# Patient Record
Sex: MALE | Race: WHITE | NOT HISPANIC OR LATINO | Employment: FULL TIME | URBAN - METROPOLITAN AREA
[De-identification: names, ages, dates, MRNs, and addresses within clinical notes are randomized per-mention and may not be internally consistent; named-entity substitution may affect disease eponyms.]

---

## 2017-02-22 ENCOUNTER — OFFICE VISIT (OUTPATIENT)
Dept: URGENT CARE | Age: 22
End: 2017-02-22

## 2017-02-22 ENCOUNTER — TRANSCRIBE ORDERS (OUTPATIENT)
Dept: URGENT CARE | Age: 22
End: 2017-02-22

## 2017-02-22 ENCOUNTER — LAB REQUISITION (OUTPATIENT)
Dept: LAB | Facility: HOSPITAL | Age: 22
End: 2017-02-22

## 2017-02-22 ENCOUNTER — APPOINTMENT (OUTPATIENT)
Dept: LAB | Age: 22
End: 2017-02-22

## 2017-02-22 DIAGNOSIS — J02.9 ACUTE PHARYNGITIS: ICD-10-CM

## 2017-02-22 PROCEDURE — 87070 CULTURE OTHR SPECIMN AEROBIC: CPT | Performed by: NURSE PRACTITIONER

## 2017-02-22 PROCEDURE — G0382 LEV 3 HOSP TYPE B ED VISIT: HCPCS

## 2017-02-24 LAB — BACTERIA THROAT CULT: NORMAL

## 2017-05-07 ENCOUNTER — OFFICE VISIT (OUTPATIENT)
Dept: URGENT CARE | Age: 22
End: 2017-05-07

## 2017-05-07 PROCEDURE — G0382 LEV 3 HOSP TYPE B ED VISIT: HCPCS | Performed by: FAMILY MEDICINE

## 2017-10-24 ENCOUNTER — OFFICE VISIT (OUTPATIENT)
Dept: URGENT CARE | Age: 22
End: 2017-10-24

## 2017-10-24 PROCEDURE — G0382 LEV 3 HOSP TYPE B ED VISIT: HCPCS | Performed by: FAMILY MEDICINE

## 2017-10-25 NOTE — PROGRESS NOTES
Assessment  1  Acute URI (465 9) (J06 9)    Plan  Acute URI    · Amoxicillin 500 MG Oral Capsule; TAKE 1 CAPSULE 3 TIMES DAILY UNTIL GONE    Discussion/Summary  Discussion Summary:   Amoxicillin 3 times a day until finished (please take probiotics)  /asthma medication is needed  follow-up with family physician as needed  go to the hospital emergency department if worse  Medication Side Effects Reviewed: Possible side effects of new medications were reviewed with the patient/guardian today  Understands and agrees with treatment plan: The treatment plan was reviewed with the patient/guardian  The patient/guardian understands and agrees with the treatment plan   Counseling Documentation With Imm: The patient was counseled regarding  Follow Up Instructions: Follow Up with your Primary Care Provider in 7-10 days  If your symptoms worsen, go to the nearest [place] [address] Emergency Department  Chief Complaint  1  Cold Symptoms  Chief Complaint Free Text Note Form: c/o cold x 2-3 days      History of Present Illness  HPI: Patient states he has a cold; patient states his asthma has been a little worse   Hospital Based Practices Required Assessment:   Pain Assessment   the patient states they do not have pain  Abuse And Domestic Violence Screen    Yes, the patient is safe at home  -- The patient states no one is hurting them  Depression And Suicide Screen  No, the patient has not had thoughts of hurting themself  No, the patient has not felt depressed in the past 7 days  Prefered Language is  english  Review of Systems  Focused-Male:   Constitutional: as noted in HPI    ENT: nasal discharge, but-- as noted in HPI  Cardiovascular: no complaints of slow or fast heart rate, no chest pain, no palpitations, no leg claudication or lower extremity edema  Respiratory: cough-- and-- wheezing, but-- as noted in HPI     Gastrointestinal: no complaints of abdominal pain, no constipation, no nausea or vomiting, no diarrhea or bloody stools  Genitourinary: no complaints of dysuria or incontinence, no hesitancy, no nocturia, no genital lesion, no inadequacy of penile erection  Musculoskeletal: no complaints of arthralgia, no myalgia, no joint swelling or stiffness, no limb pain or swelling  Integumentary: no complaints of skin rash or lesion, no itching or dry skin, no skin wounds  Neurological: no complaints of headache, no confusion, no numbness or tingling, no dizziness or fainting  ROS Reviewed:   ROS reviewed  Active Problems  1  Acute gastroenteritis (558 9) (K52 9)   2  Acute URI (465 9) (J06 9)   3  Asthma (493 90) (J45 909)   4  Candidiasis, cutaneous (112 3) (B37 2)   5  Epistaxis (784 7) (R04 0)   6  Gastroenteritis (558 9) (K52 9)   7  Shortness of breath (786 05) (R06 02)   8  Sinusitis, acute (461 9) (J01 90)   9  Skin rash (782 1) (R21)   10  Sore throat (462) (J02 9)    Past Medical History  1  Acute pharyngitis (462) (J02 9)   2  History of Fracture Of The Ankle (824 8)   3  History of Mild Intermittent Asthma (493 90)   4  History of Sore throat (462) (J02 9)  Active Problems And Past Medical History Reviewed: The active problems and past medical history were reviewed and updated today  Family History  Mother    1  No pertinent family history  Family History    2  Family history of No Significant Family History  Family History Reviewed: The family history was reviewed and updated today  Social History   · Denied: History of Alcohol Use (History)   · Daily Coffee Consumption (1  Cups/Day)   · Denied: History of Drug Use   · Never a smoker   · Smoker, current status unknown (305 1) (F17 200)  Social History Reviewed: The social history was reviewed and updated today  The social history was reviewed and is unchanged  Surgical History  1  History of Oral Surgery Tooth Extraction  Surgical History Reviewed: The surgical history was reviewed and updated today  Current Meds   1  Albuterol Sulfate  MCG/ACT AERS; Therapy: (Recorded:52Ipt6430) to Recorded   2  Flovent HFA 44 MCG/ACT Inhalation Aerosol; INHALE 2 PUFFS EVERY 12 HOURS; Therapy: 34IOA6614 to (Evaluate:39Ref4619)  Requested for: 72TQV2859; Last   Rx:70Sow7849 Ordered  Medication List Reviewed: The medication list was reviewed and updated today  Allergies  1  No Known Drug Allergies  2  No Known Environmental Allergies   3  No Known Food Allergies    Vitals  Signs   Recorded: 74QQJ1533 02:42PM   Temperature: 97 8 F, Temporal  Heart Rate: 89  Respiration: 18  Systolic: 957  Diastolic: 78  Height: 6 ft   Weight: 320 lb   BMI Calculated: 43 4  BSA Calculated: 2 6  O2 Saturation: 96  Pain Scale: 4    Physical Exam    Constitutional   General appearance: No acute distress, well appearing and well nourished  Ears, Nose, Mouth, and Throat   External inspection of ears and nose: Normal     Otoscopic examination: Tympanic membrance translucent with normal light reflex  Canals patent without erythema  -- nasal congestion  -- injection the oropharynx  Pulmonary   Respiratory effort: No increased work of breathing or signs of respiratory distress  Auscultation of lungs: Clear to auscultation  -- no wheezing  Cardiovascular   Auscultation of heart: Normal rate and rhythm, normal S1 and S2, without murmurs  Lymphatic   Palpation of lymph nodes in neck: No lymphadenopathy  Skin good color and turgor  Neurologic grossly intact, no nuchal rigidity     Psychiatric   Orientation to person, place and time: Normal     Mood and affect: Normal        Signatures   Electronically signed by : Gladys Marino DO; Oct 24 2017  2:52PM EST                       (Author)

## 2017-12-29 ENCOUNTER — APPOINTMENT (EMERGENCY)
Dept: CT IMAGING | Facility: HOSPITAL | Age: 22
End: 2017-12-29
Payer: COMMERCIAL

## 2017-12-29 ENCOUNTER — HOSPITAL ENCOUNTER (EMERGENCY)
Facility: HOSPITAL | Age: 22
Discharge: HOME | End: 2017-12-29
Attending: EMERGENCY MEDICINE | Admitting: EMERGENCY MEDICINE
Payer: COMMERCIAL

## 2017-12-29 VITALS
HEART RATE: 91 BPM | BODY MASS INDEX: 45.43 KG/M2 | OXYGEN SATURATION: 99 % | DIASTOLIC BLOOD PRESSURE: 80 MMHG | RESPIRATION RATE: 16 BRPM | WEIGHT: 315 LBS | SYSTOLIC BLOOD PRESSURE: 130 MMHG | TEMPERATURE: 99 F

## 2017-12-29 DIAGNOSIS — R42 DIZZINESS: Primary | ICD-10-CM

## 2017-12-29 LAB
ALBUMIN SERPL BCP-MCNC: 3.5 G/DL (ref 3.5–5)
ALP SERPL-CCNC: 78 U/L (ref 46–116)
ALT SERPL W P-5'-P-CCNC: 54 U/L (ref 12–78)
ANION GAP SERPL CALCULATED.3IONS-SCNC: 7 MMOL/L (ref 4–13)
AST SERPL W P-5'-P-CCNC: 22 U/L (ref 5–45)
BASOPHILS # BLD AUTO: 0.01 THOUSANDS/ΜL (ref 0–0.1)
BASOPHILS NFR BLD AUTO: 0 % (ref 0–1)
BILIRUB SERPL-MCNC: 0.2 MG/DL (ref 0.2–1)
BUN SERPL-MCNC: 10 MG/DL (ref 5–25)
CALCIUM SERPL-MCNC: 8.7 MG/DL (ref 8.3–10.1)
CHLORIDE SERPL-SCNC: 102 MMOL/L (ref 100–108)
CLARITY, POC: CLEAR
CO2 SERPL-SCNC: 31 MMOL/L (ref 21–32)
COLOR, POC: YELLOW
CREAT SERPL-MCNC: 0.63 MG/DL (ref 0.6–1.3)
EOSINOPHIL # BLD AUTO: 0.23 THOUSAND/ΜL (ref 0–0.61)
EOSINOPHIL NFR BLD AUTO: 3 % (ref 0–6)
ERYTHROCYTE [DISTWIDTH] IN BLOOD BY AUTOMATED COUNT: 12.8 % (ref 11.6–15.1)
EST. AVERAGE GLUCOSE BLD GHB EST-MCNC: 108 MG/DL
EXT BILIRUBIN, UA: NEGATIVE
EXT BLOOD URINE: NEGATIVE
EXT GLUCOSE, UA: NEGATIVE
EXT KETONES: NEGATIVE
EXT NITRITE, UA: NEGATIVE
EXT PH, UA: 5
EXT PROTEIN, UA: NEGATIVE
EXT SPECIFIC GRAVITY, UA: 1.02
EXT UROBILINOGEN: NEGATIVE
GFR SERPL CREATININE-BSD FRML MDRD: 140 ML/MIN/1.73SQ M
GLUCOSE SERPL-MCNC: 99 MG/DL (ref 65–140)
HBA1C MFR BLD: 5.4 % (ref 4.2–6.3)
HCT VFR BLD AUTO: 46.3 % (ref 36.5–49.3)
HGB BLD-MCNC: 15.3 G/DL (ref 12–17)
LYMPHOCYTES # BLD AUTO: 2.53 THOUSANDS/ΜL (ref 0.6–4.47)
LYMPHOCYTES NFR BLD AUTO: 33 % (ref 14–44)
MCH RBC QN AUTO: 29.6 PG (ref 26.8–34.3)
MCHC RBC AUTO-ENTMCNC: 33 G/DL (ref 31.4–37.4)
MCV RBC AUTO: 90 FL (ref 82–98)
MONOCYTES # BLD AUTO: 0.66 THOUSAND/ΜL (ref 0.17–1.22)
MONOCYTES NFR BLD AUTO: 9 % (ref 4–12)
NEUTROPHILS # BLD AUTO: 4.31 THOUSANDS/ΜL (ref 1.85–7.62)
NEUTS SEG NFR BLD AUTO: 55 % (ref 43–75)
PLATELET # BLD AUTO: 226 THOUSANDS/UL (ref 149–390)
PMV BLD AUTO: 10 FL (ref 8.9–12.7)
POTASSIUM SERPL-SCNC: 3.8 MMOL/L (ref 3.5–5.3)
PROT SERPL-MCNC: 6.9 G/DL (ref 6.4–8.2)
RBC # BLD AUTO: 5.17 MILLION/UL (ref 3.88–5.62)
SODIUM SERPL-SCNC: 140 MMOL/L (ref 136–145)
WBC # BLD AUTO: 7.74 THOUSAND/UL (ref 4.31–10.16)
WBC # BLD EST: NEGATIVE 10*3/UL

## 2017-12-29 PROCEDURE — 96360 HYDRATION IV INFUSION INIT: CPT

## 2017-12-29 PROCEDURE — 85025 COMPLETE CBC W/AUTO DIFF WBC: CPT | Performed by: PHYSICIAN ASSISTANT

## 2017-12-29 PROCEDURE — 81002 URINALYSIS NONAUTO W/O SCOPE: CPT | Performed by: PHYSICIAN ASSISTANT

## 2017-12-29 PROCEDURE — 96361 HYDRATE IV INFUSION ADD-ON: CPT

## 2017-12-29 PROCEDURE — 99284 EMERGENCY DEPT VISIT MOD MDM: CPT

## 2017-12-29 PROCEDURE — 83036 HEMOGLOBIN GLYCOSYLATED A1C: CPT | Performed by: PHYSICIAN ASSISTANT

## 2017-12-29 PROCEDURE — 70450 CT HEAD/BRAIN W/O DYE: CPT

## 2017-12-29 PROCEDURE — 80053 COMPREHEN METABOLIC PANEL: CPT | Performed by: PHYSICIAN ASSISTANT

## 2017-12-29 PROCEDURE — 36415 COLL VENOUS BLD VENIPUNCTURE: CPT | Performed by: PHYSICIAN ASSISTANT

## 2017-12-29 RX ADMIN — SODIUM CHLORIDE 1000 ML: 0.9 INJECTION, SOLUTION INTRAVENOUS at 18:49

## 2017-12-30 NOTE — DISCHARGE INSTRUCTIONS
Dizziness   WHAT YOU NEED TO KNOW:   Dizziness is a feeling of being off balance or unsteady  Common causes of dizziness are an inner ear fluid imbalance or a lack of oxygen in your blood  Dizziness may be acute (lasts 3 days or less) or chronic (lasts longer than 3 days)  You may have dizzy spells that last from seconds to a few hours  DISCHARGE INSTRUCTIONS:   Return to the emergency department if:   · You have a headache and a stiff neck  · You have shaking chills and a fever  · You vomit over and over with no relief  · Your vomit or bowel movements are red or black  · You have pain in your chest, back, or abdomen  · You have numbness, especially in your face, arms, or legs  · You have trouble moving your arms or legs  · You are confused  Contact your healthcare provider if:   · You have a fever  · Your symptoms do not get better with treatment  · You have questions or concerns about your condition or care  Manage your symptoms:   · Do not drive  or operate heavy machinery when you are dizzy  · Get up slowly  from sitting or lying down  · Drink plenty of liquids  Liquids help prevent dehydration  Ask how much liquid to drink each day and which liquids are best for you  Follow up with your healthcare provider as directed:  Write down your questions so you remember to ask them during your visits  © 2017 [address] Information is for End User's use only and may not be sold, redistributed or otherwise used for commercial purposes  All illustrations and images included in CareNotes® are the copyrighted property of [employer]  or [address]  The above information is an  only  It is not intended as medical advice for individual conditions or treatments  Talk to your doctor, nurse or pharmacist before following any medical regimen to see if it is safe and effective for you

## 2018-01-01 NOTE — ED PROVIDER NOTES
History  Chief Complaint   Patient presents with    Dizziness     Pt reports intermittant dizziness x 2 weeks  FSBS = 188 at approx 1500  Reports high blood pressure of 145/101       51-year-old male presents to the emergency department with complaints of intermittent dizziness  States that he has been getting dizzy over the past 2 weeks  Describes the dizziness as double vision  States that this goes away after a short time when lying down or taking a nap  Sometimes has associated headaches  States that his family has a history of diabetes and high blood pressure  Has been taking his blood pressure at home and notes that has been running high  Took his blood sugar at home today and it was 188  This was not fasting reading  He does not follow with a family doctor  States that he recently had an eye exam and got new glasses,   But that double vision started prior to this  History provided by:  Patient   used: No    Dizziness   Description: double vision  Severity:  Mild  Onset quality:  Gradual  Duration:  2 weeks  Timing:  Intermittent  Progression:  Waxing and waning  Chronicity:  New  Context: not when bending over, not with bowel movement, not with ear pain, not with eye movement, not with head movement, not with inactivity, not with loss of consciousness, not with medication, not with physical activity, not when standing up and not when urinating    Relieved by:  Lying down  Associated symptoms: vision changes    Associated symptoms: no blood in stool, no chest pain, no diarrhea, no headaches, no hearing loss, no nausea, no palpitations, no shortness of breath, no syncope, no tinnitus, no vomiting and no weakness        Prior to Admission Medications   Prescriptions Last Dose Informant Patient Reported? Taking?   ibuprofen (MOTRIN) 800 mg tablet   No No   Sig: Take 1 tablet (800 mg total) by mouth 3 (three) times a day        Facility-Administered Medications: None History reviewed  No pertinent past medical history  History reviewed  No pertinent surgical history  History reviewed  No pertinent family history  I have reviewed and agree with the history as documented  Social History   Substance Use Topics    Smoking status: Never Smoker    Smokeless tobacco: Never Used    Alcohol use Yes      Comment: socially        Review of Systems   Constitutional: Negative for activity change, appetite change, chills and fever  HENT: Negative for congestion, dental problem, drooling, ear discharge, ear pain, hearing loss, mouth sores, nosebleeds, rhinorrhea, sore throat, tinnitus and trouble swallowing  Eyes: Negative for pain, discharge and itching  Respiratory: Negative for cough, chest tightness, shortness of breath and wheezing  Cardiovascular: Negative for chest pain, palpitations and syncope  Gastrointestinal: Negative for abdominal pain, blood in stool, constipation, diarrhea, nausea and vomiting  Endocrine: Negative for cold intolerance and heat intolerance  Genitourinary: Negative for difficulty urinating, dysuria, flank pain, frequency and urgency  Skin: Negative for rash and wound  Allergic/Immunologic: Negative for food allergies and immunocompromised state  Neurological: Positive for dizziness  Negative for seizures, syncope, weakness, numbness and headaches  Psychiatric/Behavioral: Negative for agitation, behavioral problems and confusion         Physical Exam  ED Triage Vitals   Temperature Pulse Respirations Blood Pressure SpO2   12/29/17 1606 12/29/17 1606 12/29/17 1606 12/29/17 1606 12/29/17 1606   99 °F (37 2 °C) 84 16 152/86 99 %      Temp Source Heart Rate Source Patient Position - Orthostatic VS BP Location FiO2 (%)   12/29/17 1606 12/29/17 2000 12/29/17 2000 12/29/17 2000 --   Oral Monitor Sitting Left arm       Pain Score       12/29/17 1606       5           Orthostatic Vital Signs  Vitals:    12/29/17 1606 12/29/17 2000 12/29/17 2008   BP: 152/86 136/85 130/80   Pulse: 84 90 91   Patient Position - Orthostatic VS:  Sitting Sitting       Physical Exam   Constitutional: He is oriented to person, place, and time  He appears well-developed and well-nourished  No distress  HENT:   Head: Normocephalic and atraumatic  Right Ear: External ear normal    Left Ear: External ear normal    Mouth/Throat: Oropharynx is clear and moist  No oropharyngeal exudate  Eyes: Conjunctivae and EOM are normal  Pupils are equal, round, and reactive to light  Neck: No JVD present  No tracheal deviation present  Cardiovascular: Normal rate, regular rhythm and normal heart sounds  Exam reveals no gallop and no friction rub  No murmur heard  Pulmonary/Chest: Effort normal and breath sounds normal  No respiratory distress  He has no wheezes  He has no rales  He exhibits no tenderness  Abdominal: Soft  Bowel sounds are normal  He exhibits no distension  There is no tenderness  There is no guarding  Musculoskeletal: Normal range of motion  He exhibits no edema, tenderness or deformity  Lymphadenopathy:     He has no cervical adenopathy  Neurological: He is alert and oriented to person, place, and time  Skin: Skin is warm and dry  No rash noted  He is not diaphoretic  No erythema  Psychiatric: He has a normal mood and affect  His behavior is normal    Nursing note and vitals reviewed        ED Medications  Medications   sodium chloride 0 9 % bolus 1,000 mL (0 mL Intravenous Stopped 12/29/17 2044)       Diagnostic Studies  Results Reviewed     Procedure Component Value Units Date/Time    Hemoglobin A1c [[id number]]  (Normal) Collected:  12/29/17 1848    Lab Status:  Final result Specimen:  Blood from Arm, Right Updated:  12/29/17 2323     Hemoglobin A1C 5 4 %       mg/dl     POCT urinalysis dipstick [[id number]]  (Normal) Resulted:  12/29/17 2008    Lab Status:  Final result Specimen:  Urine Updated:  12/29/17 2008     Color, UA yellow Clarity, UA clear     EXT Glucose, UA negative     EXT Bilirubin, UA (Ref: Negative) negative     EXT Ketones, UA (Ref: Negative) negative     EXT Spec Grav, UA 1 020     EXT Blood, UA (Ref: Negative) negative     EXT pH, UA 5 0     EXT Protein, UA (Ref: Negative) negative     EXT Urobilinogen, UA (Ref: 0 2- 1 0) negative     EXT Leukocytes, UA (Ref: Negative) negative     EXT Nitrite, UA (Ref: Negative) negative    Comprehensive metabolic panel [[id number]] Collected:  12/29/17 1848    Lab Status:  Final result Specimen:  Blood from Arm, Right Updated:  12/29/17 1919     Sodium 140 mmol/L      Potassium 3 8 mmol/L      Chloride 102 mmol/L      CO2 31 mmol/L      Anion Gap 7 mmol/L      BUN 10 mg/dL      Creatinine 0 63 mg/dL      Glucose 99 mg/dL      Calcium 8 7 mg/dL      AST 22 U/L      ALT 54 U/L      Alkaline Phosphatase 78 U/L      Total Protein 6 9 g/dL      Albumin 3 5 g/dL      Total Bilirubin 0 20 mg/dL      eGFR 140 ml/min/1 73sq m     Narrative:         National Kidney Disease Education Program recommendations are as follows:  GFR calculation is accurate only with a steady state creatinine  Chronic Kidney disease less than 60 ml/min/1 73 sq  meters  Kidney failure less than 15 ml/min/1 73 sq  meters      CBC and differential [[id number]]  (Normal) Collected:  12/29/17 1848    Lab Status:  Final result Specimen:  Blood from Arm, Right Updated:  12/29/17 1855     WBC 7 74 Thousand/uL      RBC 5 17 Million/uL      Hemoglobin 15 3 g/dL      Hematocrit 46 3 %      MCV 90 fL      MCH 29 6 pg      MCHC 33 0 g/dL      RDW 12 8 %      MPV 10 0 fL      Platelets 936 Thousands/uL      Neutrophils Relative 55 %      Lymphocytes Relative 33 %      Monocytes Relative 9 %      Eosinophils Relative 3 %      Basophils Relative 0 %      Neutrophils Absolute 4 31 Thousands/µL      Lymphocytes Absolute 2 53 Thousands/µL      Monocytes Absolute 0 66 Thousand/µL      Eosinophils Absolute 0 23 Thousand/µL      Basophils Absolute 0 01 Thousands/µL                  CT head without contrast   Final Result by Kwan Bae MD (12/29 1838)      Normal unenhanced CT of the head  Workstation performed: [id number]                    Procedures  Procedures       Phone Contacts  ED Phone Contact    ED Course  ED Course                                MDM  Number of Diagnoses or Management Options  Dizziness:   Diagnosis management comments:   Differential diagnosis includes but not limited to:    New onset diabetes, hypertension, cranial nerve palsy,   Ocular migraine       Amount and/or Complexity of Data Reviewed  Clinical lab tests: ordered and reviewed  Tests in the radiology section of CPT®: reviewed and ordered  Independent visualization of images, tracings, or specimens: yes      CritCare Time    Disposition  Final diagnoses:   Dizziness     Time reflects when diagnosis was documented in both MDM as applicable and the Disposition within this note     Time User Action Codes Description Comment    12/29/2017  8:29 PM Jamee Meter Add [R42] Dizziness       ED Disposition     ED Disposition Condition Comment    Discharge  Cheryl Croft discharge to home/self care  Condition at discharge: Stable        Follow-up Information     Follow up With Specialties Details Why Contact Info    Tiffanie Cary DO Family Medicine Schedule an appointment as soon as possible for a visit  [address] [place] [address]   [phone]          Discharge Medication List as of 12/29/2017  8:29 PM      STOP taking these medications       ibuprofen (MOTRIN) 800 mg tablet Comments:   Reason for Stopping:             No discharge procedures on file      ED Provider  Electronically Signed by           Herberth Taveras PA-C  12/31/17 6145

## 2018-01-03 ENCOUNTER — ALLSCRIPTS OFFICE VISIT (OUTPATIENT)
Dept: OTHER | Facility: OTHER | Age: 23
End: 2018-01-03

## 2018-01-04 NOTE — PROGRESS NOTES
Assessment   1  IFG (impaired fasting glucose) (790 21) (R73 01)   2  Family history of malignant neoplasm of breast (V16 3) (Z80 3) : Mother   3  Family history of diabetes mellitus (V18 0) (Z83 3) : Mother, Father, Sister, Brother   4  Need for prophylactic vaccination and inoculation against influenza (V04 81) (Z23)   5  Need for Tdap vaccination (V06 1) (Z23)   6  BMI 40 0-44 9, adult (V85 41) (Z68 41)    Plan   Asthma    · Ventolin  (90 Base) MCG/ACT Inhalation Aerosol Solution; INHALE 2    PUFFS EVERY 4-6 HOURS AS NEEDED  Asthma, PMH: History of shortness of breath    · Flovent HFA 44 MCG/ACT Inhalation Aerosol; INHALE 2 PUFFS EVERY 12    HOURS  BMI 40 0-44 9, adult    · Diets that are low in carbohydrates and high in protein are very popular for weight loss ;    Status:Complete;   Done: 27KDG9483   · Eat a low fat and low cholesterol diet ; Status:Complete;   Done: 32OIR1346   · There are many exercise options for seniors ; Status:Complete;   Done: 83URZ5333   · We encourage all of our patients to exercise regularly  30 minutes of exercise or physical    activity five or more days a week is recommended for children and adults ;    Status:Complete;   Done: 43SOZ0441   · Call [phone] if: You have pain in your abdomen ; Status:Complete;   Done:    44INO1674   · Call 911 if: You have sudden or severe chest pain with shortness of breath, rapid    breathing, or cough ; Status:Complete;   Done: 89YOO7077   · Seek Immediate Medical Attention if: You experience a new kind of chest pain (angina) or    pressure ; Status:Complete;   Done: 88YBP1247  BMI 40 0-44 9, adult, IFG (impaired fasting glucose)    · (1) LIPID PANEL FASTING W DIRECT LDL REFLEX; Status:Active; Requested    for:02Apr2018;   IFG (impaired fasting glucose)    · (1) COMPREHENSIVE METABOLIC PANEL; Status:Active; Requested for:02Apr2018;    · (1) HEMOGLOBIN A1C; Status:Active;  Requested for:02Apr2018;    · (1) TSH WITH FT4 REFLEX; Status:Active; Requested for:02Apr2018;   Need for prophylactic vaccination and inoculation against influenza    · Stop: Fluzone Quadrivalent 0 5 ML Intramuscular Suspension Prefilled    Syringe   · Fluzone Quadrivalent 0 5 ML Intramuscular Suspension Prefilled Syringe  Need for Tdap vaccination    · Adacel 5-2-15 5 LF-MCG/0 5 Intramuscular Suspension    Discussion/Summary      Lanre Chapman has started an intense diet plan, he is starting to exercise  will watch sugars carefully and check labs in 4 months  Chief Complaint   Patient here with elevated blood sugar and elevated blood pressure      History of Present Illness   went to ER, sugar and bp was elevated 135-156 early, no snacking after that up to 35 pounds thirsty but urinating a lot new baby on the way    The patient is being seen for a routine clinic follow-up of pre-diabetes  Recent measurements: fasting glucose 135 mg/dL  Symptoms:  weight gain  Review of Systems        Constitutional: No fever or chills, feels well, no tiredness, no recent weight gain or weight loss  Eyes: No complaints of eye pain, no red eyes, no discharge from eyes, no itchy eyes  ENT: no complaints of earache, no hearing loss, no nosebleeds, no nasal discharge, no sore throat, no hoarseness  Cardiovascular: No complaints of slow heart rate, no fast heart rate, no chest pain, no palpitations, no leg claudication, no lower extremity  Respiratory: No complaints of shortness of breath, no wheezing, no cough, no SOB on exertion, no orthopnea or PND  Gastrointestinal: No complaints of abdominal pain, no constipation, no nausea or vomiting, no diarrhea or bloody stools  Genitourinary: No complaints of dysuria, no incontinence, no hesitancy, no nocturia, no genital lesion, no testicular pain  Musculoskeletal: No complaints of arthralgia, no myalgias, no joint swelling or stiffness, no limb pain or swelling        Integumentary: No complaints of skin rash or skin lesions, no itching, no skin wound, no dry skin  Neurological: No compliants of headache, no confusion, no convulsions, no numbness or tingling, no dizziness or fainting, no limb weakness, no difficulty walking  Psychiatric: Is not suicidal, no sleep disturbances, no anxiety or depression, no change in personality, no emotional problems  Endocrine: No complaints of proptosis, no hot flashes, no muscle weakness, no erectile dysfunction, no deepening of the voice, no feelings of weakness  Hematologic/Lymphatic: No complaints of swollen glands, no swollen glands in the neck, does not bleed easily, no easy bruising  Active Problems   1  Asthma (493 90) (J45 909)    Past Medical History   1  Acute pharyngitis (462) (J02 9)   2  History of Fracture Of The Ankle (824 8)   3  History of Mild Intermittent Asthma (493 90)   4  History of Sore throat (462) (J02 9)     The active problems and past medical history were reviewed and updated today  Surgical History   1  History of Oral Surgery Tooth Extraction     The surgical history was reviewed and updated today  Family History   Family History    1  Family history of No Significant Family History     The family history was reviewed and updated today  Social History    · Denied: History of Alcohol Use (History)   · Daily Coffee Consumption (1  Cups/Day)   · Denied: History of Drug Use   · Never a smoker   · Smoker, current status unknown (305 1) (F17 200)  The social history was reviewed and updated today  Current Meds    1  Albuterol Sulfate  MCG/ACT AERS; Therapy: (Recorded:41Btc4769) to Recorded   2  Flovent HFA 44 MCG/ACT Inhalation Aerosol; INHALE 2 PUFFS EVERY 12 HOURS; Therapy: 81ZHU7501 to (Evaluate:47Cqk7489)  Requested for: 59GWR2207; Last     Rx:35Uja2014 Ordered     The medication list was reviewed and updated today  Allergies   1  No Known Drug Allergies  2   No Known Environmental Allergies   3  No Known Food Allergies    Vitals   Vital Signs    Recorded: 67NPG1101 10:06AM   Heart Rate 88   Respiration 18   Systolic 143   Diastolic 74   Height 6 ft    Weight 335 lb 8 oz   BMI Calculated 45 5   BSA Calculated 2 65     Physical Exam        Constitutional      General appearance: No acute distress, well appearing and well nourished  Pulmonary      Respiratory effort: No increased work of breathing or signs of respiratory distress  Auscultation of lungs: Clear to auscultation, equal breath sounds bilaterally, no wheezes, no rales, no rhonci  Cardiovascular      Auscultation of heart: Normal rate and rhythm, normal S1 and S2, without murmurs  Examination of extremities for edema and/or varicosities: Normal        Abdomen      Abdomen: Non-tender, no masses  Liver and spleen: No hepatomegaly or splenomegaly  Lymphatic      Palpation of lymph nodes in neck: No lymphadenopathy  Results/Data   (1) HEMOGLOBIN A1C [id number] 06:48PM [facility], Provider   Test ordered by: Michael Aguilar      Test Name Result Flag Reference   HEMOGLOBIN A1C 5 4 %  4 2-6 3   EST  AVG   GLUCOSE 108 mg/dl        Future Appointments      Date/Time Provider Specialty Site   05/03/2018 02:15 PM Sujit Perrin DO Family Medicine [address] [facility]     Signatures    Electronically signed by : Jenny Encinas DO; Matt  3 2018 11:14AM EST                       (Author)

## 2018-01-07 ENCOUNTER — OFFICE VISIT (OUTPATIENT)
Dept: URGENT CARE | Age: 23
End: 2018-01-07
Payer: COMMERCIAL

## 2018-01-07 PROCEDURE — 99212 OFFICE O/P EST SF 10 MIN: CPT | Performed by: FAMILY MEDICINE

## 2018-01-08 NOTE — PROGRESS NOTES
Assessment   1  Pain, dental (525 9) (K08 89)    Plan   Pain, dental    · Start: Amoxicillin 500 MG Oral Capsule; TAKE 1 CAPSULE 3 TIMES DAILY UNTIL GONE    Discussion/Summary   Discussion Summary:    Amoxicillin 3 times a day until finished ( please take probiotics)  Aleve every 12 hours for pain and inflammation ( please take with food )   and swish mouth with warm salt water or mouthwash  foods  follow-up with dentist tomorrow as scheduled  go to the hospital emergency department if worse  Medication Side Effects Reviewed: Possible side effects of new medications were reviewed with the patient/guardian today  Understands and agrees with treatment plan: The treatment plan was reviewed with the patient/guardian  The patient/guardian understands and agrees with the treatment plan    Counseling Documentation With Imm: The patient was counseled regarding  Chief Complaint   1  Pain  Chief Complaint Free Text Note Form: C/O tooth pain upper right side for 8 months, but it got worse last night  Cheek is swollen and tooth is throbbing  has a dental appt  tomorrow, but has to work tonight and can't handle the pain  History of Present Illness   HPI: Right upper dental pain and swelling - patient states he has a dental appointment tomorrow morning (01/08/2018)    Hospital Based Practices Required Assessment:      Pain Assessment      the patient states they have pain  The pain is located in the upper right tooth  The patient describes the pain as throbbing  (on a scale of 0 to 10, the patient rates the pain at 8 )      Abuse And Domestic Violence Screen       Yes, the patient is safe at home  -- The patient states no one is hurting them  Depression And Suicide Screen  No, the patient has not had thoughts of hurting themself  No, the patient has not felt depressed in the past 7 days        Review of Systems   Focused-Male:      Constitutional: as noted in HPI       ENT: no complaints of earache, no loss of hearing, no nosebleeds or nasal discharge, no sore throat or hoarseness  Cardiovascular: no complaints of slow or fast heart rate, no chest pain, no palpitations, no leg claudication or lower extremity edema  Respiratory: no complaints of shortness of breath, no wheezing or cough, no dyspnea on exertion, no orthopnea or PND  Gastrointestinal: no complaints of abdominal pain, no constipation, no nausea or vomiting, no diarrhea or bloody stools  Genitourinary: no complaints of dysuria or incontinence, no hesitancy, no nocturia, no genital lesion, no inadequacy of penile erection  Musculoskeletal: no complaints of arthralgia, no myalgia, no joint swelling or stiffness, no limb pain or swelling  Integumentary: no complaints of skin rash or lesion, no itching or dry skin, no skin wounds  Neurological: no complaints of headache, no confusion, no numbness or tingling, no dizziness or fainting  ROS Reviewed:    ROS reviewed  Active Problems   1  Asthma (493 90) (J45 909)  2  BMI 40 0-44 9, adult (V85 41) (Z68 41)  3  IFG (impaired fasting glucose) (790 21) (R73 01)  4  Need for prophylactic vaccination and inoculation against influenza (V04 81) (Z23)  5  Need for Tdap vaccination (V06 1) (Z23)    Past Medical History   1  Acute pharyngitis (462) (J02 9)  2  History of Fracture Of The Ankle (824 8)  3  History of Mild Intermittent Asthma (493 90)  4  History of Sore throat (462) (J02 9)  Active Problems And Past Medical History Reviewed: The active problems and past medical history were reviewed and updated today  Family History   Mother   1  Family history of diabetes mellitus (V18 0) (Z83 3)  2  Family history of malignant neoplasm of breast (V16 3) (Z80 3)  Father   3  Family history of diabetes mellitus (V18 0) (Z83 3)  Sister   4  Family history of diabetes mellitus (V18 0) (Z83 3)  Brother   5   Family history of diabetes mellitus (V18 0) (Z83 3)  Family History   6  Family history of No Significant Family History  Family History Reviewed: The family history was reviewed and updated today  Social History    · Denied: History of Alcohol Use (History)   · Daily Coffee Consumption (1  Cups/Day)   · Denied: History of Drug Use   · Never a smoker   · Smoker, current status unknown (305 1) (F17 200)  Social History Reviewed: The social history was reviewed and updated today  The social history was reviewed and is unchanged  Surgical History   1  History of Oral Surgery Tooth Extraction  Surgical History Reviewed: The surgical history was reviewed and updated today  Current Meds   1  Flovent HFA 44 MCG/ACT Inhalation Aerosol; INHALE 2 PUFFS EVERY 12 HOURS; Therapy: 66VHL3509 to (Yong María Elena)  Requested for: 11XWT5414; Last     Rx:03Jan2018 Ordered  2  Ventolin  (90 Base) MCG/ACT Inhalation Aerosol Solution; INHALE 2 PUFFS     EVERY 4-6 HOURS AS NEEDED  Requested for: 84JZX0705; Last CS:55CRE1345     Ordered  Medication List Reviewed: The medication list was reviewed and updated today  Allergies   1  No Known Drug Allergies  2  No Known Environmental Allergies  3  No Known Food Allergies    Vitals   Signs   Recorded: 52NLY1299 03:13PM   Temperature: 97 1 F, Temporal  Heart Rate: 96  Respiration: 18  Systolic: 304, RUE, Sitting  Diastolic: 80, RUE, Sitting  Height: 6 ft   Weight: 335 lb 8 oz  BMI Calculated: 45 5  BSA Calculated: 2 65  O2 Saturation: 97    Physical Exam        Ears, Nose, Mouth, and Throat tenderness of right upper dentition with swelling of gum  Message   Return to work or school:         No work 01/07/2018  1           1 Amended By: Ashley Jones; Jan 07 2018 3:38 PM EST      Future Appointments      Date/Time Provider Specialty Site   05/03/2018 02:15 PM Elaine Salazar DO Family Medicine [facility]     Signatures    Electronically signed by : Dionne Perez DO; Jan 7 2018  3:34PM EST                       (Author)     Electronically signed by : Zulma Toney DO; Jan 7 2018  3:38PM EST                       (Author)

## 2018-01-18 NOTE — MISCELLANEOUS
Message  Return to work or school:   Nova Guaman is under my professional care   He was seen in my office on 12/30/2016   He is able to return to work on  01/02/2016            Signatures   Electronically signed by : Mirela German; Dec 30 2016 11:13AM EST                       (Author)    Electronically signed by : Mirela German; Matt  3 2017  8:04AM EST                       (Author)

## 2018-01-20 ENCOUNTER — OFFICE VISIT (OUTPATIENT)
Dept: URGENT CARE | Age: 23
End: 2018-01-20
Payer: COMMERCIAL

## 2018-01-20 PROCEDURE — 99213 OFFICE O/P EST LOW 20 MIN: CPT | Performed by: FAMILY MEDICINE

## 2018-01-23 VITALS
HEART RATE: 96 BPM | WEIGHT: 315 LBS | SYSTOLIC BLOOD PRESSURE: 124 MMHG | TEMPERATURE: 97.1 F | HEIGHT: 72 IN | DIASTOLIC BLOOD PRESSURE: 80 MMHG | OXYGEN SATURATION: 97 % | BODY MASS INDEX: 42.66 KG/M2 | RESPIRATION RATE: 18 BRPM

## 2018-01-23 VITALS
DIASTOLIC BLOOD PRESSURE: 74 MMHG | BODY MASS INDEX: 42.66 KG/M2 | HEIGHT: 72 IN | WEIGHT: 315 LBS | HEART RATE: 88 BPM | SYSTOLIC BLOOD PRESSURE: 130 MMHG | RESPIRATION RATE: 18 BRPM

## 2018-01-23 NOTE — PROGRESS NOTES
Assessment   1  Viral gastroenteritis (008 8) (A08 4)    Plan   Nausea    · Administered: Ondansetron 4 MG Oral Tablet Disintegrating (Zofran ODT)  Viral gastroenteritis    · Start: Ondansetron 4 MG Oral Tablet Disintegrating (Zofran ODT); TAKE 1 TABLET Every 8    hours PRN   · Drink plenty of fluids ; Status:Complete;   Done: 28QMZ7599   · Resume activity to your tolerance ; Status:Complete;   Done: 84NHZ4381   · We suggest that you try a probiotic supplement ; Status:Complete;   Done: 77OPG1689    Discussion/Summary   Discussion Summary:    Liquids only for the 1st 4-6 hours  Recommend Gatorade or another electrolyte drink  After that he can start following the BRAT diet  Medication Side Effects Reviewed: Possible side effects of new medications were reviewed with the patient/guardian today  Understands and agrees with treatment plan: The treatment plan was reviewed with the patient/guardian  The patient/guardian understands and agrees with the treatment plan    Counseling Documentation With Imm: The patient was counseled regarding instructions for management,-- prognosis,-- patient and family education,-- impressions,-- risks and benefits of treatment options,-- importance of compliance with treatment  Follow Up Instructions: Follow Up with your Primary Care Provider in 3-4 days  If your symptoms worsen, go to the nearest [place] [address] Emergency Department  Chief Complaint   1  Fever  2  Vomiting  Chief Complaint Free Text Note Form: Pt c/o fever, vomiting that started last evening while at work  Pt temp was 99 8 with tylenol  History of Present Illness   HPI: Patient started last night around 12 30 with nausea vomiting and fever  Patient last vomited 3 o'clock    Hospital Based Practices Required Assessment:      Pain Assessment      the patient states they do not have pain   (on a scale of 0 to 10, the patient rates the pain at 0 )      Abuse And Domestic Violence Screen       Yes, the patient is safe at home  -- The patient states no one is hurting them  Depression And Suicide Screen  No, the patient has not had thoughts of hurting themself  No, the patient has not felt depressed in the past 7 days  Vomiting: ASAEL LOPEZ presents with complaints of vomiting  Associated symptoms include nausea,-- abdominal pain,-- fever-- and-- diarrhea, but-- no abdominal bloating,-- no difficulty swallowing,-- no painful swallowing,-- no hematemesis,-- no myalgias,-- no chest pain,-- no pelvic pain,-- no back pain,-- no chills,-- no headache,-- no weight loss-- and-- no constipation  Review of Systems   Focused-Male:      Constitutional: as noted in HPI       ENT: as noted in HPI  Gastrointestinal: as noted in HPI  Active Problems   1  Asthma (493 90) (J45 909)  2  BMI 40 0-44 9, adult (V85 41) (Z68 41)  3  IFG (impaired fasting glucose) (790 21) (R73 01)  4  Need for prophylactic vaccination and inoculation against influenza (V04 81) (Z23)  5  Need for Tdap vaccination (V06 1) (Z23)  6  Pain, dental (525 9) (K08 89)    Past Medical History   1  Acute pharyngitis (462) (J02 9)  2  History of Fracture Of The Ankle (824 8)  3  History of Mild Intermittent Asthma (493 90)  4  History of Sore throat (462) (J02 9)  Active Problems And Past Medical History Reviewed: The active problems and past medical history were reviewed and updated today  Family History   Mother   1  Family history of diabetes mellitus (V18 0) (Z83 3)  2  Family history of malignant neoplasm of breast (V16 3) (Z80 3)  Father   3  Family history of diabetes mellitus (V18 0) (Z83 3)  Sister   4  Family history of diabetes mellitus (V18 0) (Z83 3)  Brother   5  Family history of diabetes mellitus (V18 0) (Z83 3)  Family History   6  Family history of No Significant Family History  Family History Reviewed: The family history was reviewed and updated today         Social History    · Denied: History of Alcohol Use (History)   · Daily Coffee Consumption (1  Cups/Day)   · Denied: History of Drug Use   · Never a smoker   · Smoker, current status unknown (305 1) (F17 200)  Social History Reviewed: The social history was reviewed and updated today  Surgical History   1  History of Oral Surgery Tooth Extraction  Surgical History Reviewed: The surgical history was reviewed and updated today  Current Meds   1  Asmanex  MCG/ACT Inhalation Aerosol; INHALE 2 PUFFS Twice daily Rinse     mouth after every use; Therapy: 47XKD0336 to (Last Rx:11Jan2018)  Requested for: 72FDW6583 Ordered  2  Ventolin  (90 Base) MCG/ACT Inhalation Aerosol Solution; INHALE 2 PUFFS     EVERY 4-6 HOURS AS NEEDED  Requested for: 13EBR3064; Last MU:47CRS0506     Ordered  Medication List Reviewed: The medication list was reviewed and updated today  Allergies   1  No Known Drug Allergies  2  No Known Environmental Allergies  3  No Known Food Allergies    Vitals   Signs   Recorded: 64HQL2114 73:28YT   Systolic: 493  Diastolic: 78  Recorded: 12VFV9440 07:45PM   Temperature: 98 9 F  Height: 6 ft 7 in  Weight: 335 lb 9 oz  BMI Calculated: 37 8  BSA Calculated: 2 84  O2 Saturation: 98  Pain Scale: 0    Physical Exam        Constitutional      General appearance: No acute distress, well appearing and well nourished  Eyes      Conjunctiva and lids: No swelling, erythema, or discharge  Pupils and irises: Equal, round and reactive to light  Abdomen Abdomen is soft and nondistended with hyperactive bowel sounds  No rebound no guarding no rigidity  Liver and spleen: No hepatomegaly or splenomegaly  Message   Return to work or school:    Jesus Arredondo is under my professional care   He was seen in my office on 01/20/2018   Please excuse work 01/19/2018 and 01/21/2018 due to illness            Future Appointments      Date/Time Provider Specialty Site   05/03/2018 02:15 PM Jermain Duvall DO Family Medicine [facility]     Signatures    Electronically signed by : Shaylee Webb, [facility]; Jan 20 2018  8:17PM EST                       (Author)     Electronically signed by : Hugh Miranda DO; Jan 22 2018  8:30AM EST                       (Co-author)

## 2018-01-24 NOTE — MISCELLANEOUS
Message  Return to work or school:        No work 01/07/2018          Future Appointments    Signatures   Electronically signed by : Gabby Mesa DO; Jan 7 2018  3:38PM EST                       (Author)

## 2018-04-02 DIAGNOSIS — R73.01 IMPAIRED FASTING GLUCOSE: ICD-10-CM

## 2018-04-04 ENCOUNTER — HOSPITAL ENCOUNTER (EMERGENCY)
Facility: HOSPITAL | Age: 23
Discharge: HOME | End: 2018-04-04
Attending: EMERGENCY MEDICINE | Admitting: EMERGENCY MEDICINE

## 2018-04-04 ENCOUNTER — APPOINTMENT (EMERGENCY)
Dept: CT IMAGING | Facility: HOSPITAL | Age: 23
End: 2018-04-04

## 2018-04-04 VITALS
HEIGHT: 71 IN | WEIGHT: 315 LBS | OXYGEN SATURATION: 96 % | DIASTOLIC BLOOD PRESSURE: 83 MMHG | BODY MASS INDEX: 44.1 KG/M2 | TEMPERATURE: 98.3 F | RESPIRATION RATE: 18 BRPM | HEART RATE: 90 BPM | SYSTOLIC BLOOD PRESSURE: 162 MMHG

## 2018-04-04 DIAGNOSIS — R10.33 PERIUMBILICAL PAIN: Primary | ICD-10-CM

## 2018-04-04 PROCEDURE — 99284 EMERGENCY DEPT VISIT MOD MDM: CPT

## 2018-04-04 PROCEDURE — 74176 CT ABD & PELVIS W/O CONTRAST: CPT

## 2018-04-04 NOTE — ED PROVIDER NOTES
History  Chief Complaint   Patient presents with    Abdominal Pain     Pt presetns w/ c/o abd pain for 2 months  Pt believes he has a hernia but wants to get it evaluatated  History provided by:  Patient  Abdominal Pain   Pain location:  Periumbilical  Pain quality: aching    Pain radiates to:  Does not radiate  Pain severity:  Mild  Onset quality:  Gradual  Timing:  Constant  Progression:  Worsening  Chronicity:  New  Relieved by:  Nothing  Worsened by:  Nothing  Ineffective treatments:  None tried  Associated symptoms: no chest pain, no chills, no cough, no diarrhea, no dysuria, no fever, no nausea, no shortness of breath and no sore throat        None       History reviewed  No pertinent past medical history  History reviewed  No pertinent surgical history  History reviewed  No pertinent family history  I have reviewed and agree with the history as documented  Social History   Substance Use Topics    Smoking status: Former Smoker    Smokeless tobacco: Former User    Alcohol use Yes      Comment: socially        Review of Systems   Constitutional: Negative for chills and fever  HENT: Negative for rhinorrhea, sore throat and trouble swallowing  Eyes: Negative for pain  Respiratory: Negative for cough, shortness of breath, wheezing and stridor  Cardiovascular: Negative for chest pain and leg swelling  Gastrointestinal: Positive for abdominal pain  Negative for diarrhea and nausea  Endocrine: Negative for polyuria  Genitourinary: Negative for dysuria, flank pain and urgency  Musculoskeletal: Negative for joint swelling, myalgias and neck stiffness  Skin: Negative for rash  Allergic/Immunologic: Negative for immunocompromised state  Neurological: Negative for dizziness, syncope, weakness, numbness and headaches  Psychiatric/Behavioral: Negative for confusion and suicidal ideas  All other systems reviewed and are negative        Physical Exam  ED Triage Vitals Temperature Pulse Respirations Blood Pressure SpO2   04/04/18 1239 04/04/18 1237 04/04/18 1237 04/04/18 1237 04/04/18 1237   98 3 °F (36 8 °C) 90 18 162/83 96 %      Temp Source Heart Rate Source Patient Position - Orthostatic VS BP Location FiO2 (%)   04/04/18 1239 04/04/18 1237 04/04/18 1237 04/04/18 1237 --   Oral Monitor Sitting Right arm       Pain Score       04/04/18 1251       6           Orthostatic Vital Signs  Vitals:    04/04/18 1237 04/04/18 1245   BP: 162/83 162/83   Pulse: 90    Patient Position - Orthostatic VS: Sitting        Physical Exam   Constitutional: He is oriented to person, place, and time  He appears well-developed and well-nourished  HENT:   Head: Normocephalic and atraumatic  Eyes: EOM are normal  Pupils are equal, round, and reactive to light  Neck: Normal range of motion  Neck supple  Cardiovascular: Normal rate and regular rhythm  Exam reveals no friction rub  No murmur heard  Pulmonary/Chest: Breath sounds normal  No respiratory distress  He has no wheezes  He has no rales  Abdominal: Soft  Bowel sounds are normal  He exhibits no distension  There is tenderness in the periumbilical area  Musculoskeletal: Normal range of motion  He exhibits no edema or tenderness  Neurological: He is alert and oriented to person, place, and time  Skin: Skin is warm  No rash noted  Psychiatric: He has a normal mood and affect  Nursing note and vitals reviewed  ED Medications  Medications - No data to display    Diagnostic Studies  Results Reviewed     None                 CT abdomen pelvis wo contrast   Final Result by Precious Botello MD (04/04 1411)      Normal unenhanced CT of the abdomen and pelvis                 Workstation performed: [id number]                    Procedures  Procedures       Phone Contacts  ED Phone Contact    ED Course  ED Course                                MDM  Number of Diagnoses or Management Options  Periumbilical pain: new and requires workup  Diagnosis management comments: 19-year-old male presents to the emergency department with symptoms of abdominal pain and periumbilical discomfort for the past several weeks duration  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  No chest pain  Mild tenderness to palpation overlying the periumbilical area  A CT scan done in the emergency department is unremarkable  Plan outpatient management followup  Given strict instructions when to return back to the emergency department  Pt re-examined and evaluated after testing and treatment  Spoke with the patient and feeling improved and sxs have resolved  Will discharge home with close f/u with pcp and instructed to return to the ED if sxs worsen or continue  Pt agrees with the plan for discharge and feels comfortable to go home with proper f/u  Advised to return for worsening or additional problems  Diagnostic tests were reviewed and questions answered  Diagnosis, care plan and treatment options were discussed  The patient understand instructions and will follow up as directed  Amount and/or Complexity of Data Reviewed  Tests in the radiology section of CPT®: ordered and reviewed  Review and summarize past medical records: yes      CritCare Time    Disposition  Final diagnoses:   Periumbilical pain     Time reflects when diagnosis was documented in both MDM as applicable and the Disposition within this note     Time User Action Codes Description Comment    4/4/2018  2:24 PM Marci Rendon Add [H91 18] Periumbilical pain       ED Disposition     ED Disposition Condition Comment    Discharge  Henrry Sender Lang discharge to home/self care  Condition at discharge: Stable        Follow-up Information    None       There are no discharge medications for this patient  No discharge procedures on file      ED Provider  Electronically Signed by           Rossy Khan DO  04/04/18 8683

## 2018-04-04 NOTE — ED NOTES
Patient transported to [facility] Missy Ruvalcaba RN  04/04/18 0802

## 2018-04-15 ENCOUNTER — OFFICE VISIT (OUTPATIENT)
Dept: URGENT CARE | Age: 23
End: 2018-04-15

## 2018-04-15 VITALS
HEART RATE: 88 BPM | OXYGEN SATURATION: 98 % | SYSTOLIC BLOOD PRESSURE: 140 MMHG | DIASTOLIC BLOOD PRESSURE: 80 MMHG | HEIGHT: 71 IN | TEMPERATURE: 97.8 F | BODY MASS INDEX: 44.1 KG/M2 | RESPIRATION RATE: 20 BRPM | WEIGHT: 315 LBS

## 2018-04-15 DIAGNOSIS — R10.33 ABDOMINAL PAIN, PERIUMBILICAL: Primary | ICD-10-CM

## 2018-04-15 PROCEDURE — G0382 LEV 3 HOSP TYPE B ED VISIT: HCPCS | Performed by: FAMILY MEDICINE

## 2018-04-15 RX ORDER — ALBUTEROL SULFATE 90 UG/1
2 AEROSOL, METERED RESPIRATORY (INHALATION)
COMMUNITY
End: 2022-03-29 | Stop reason: SDUPTHER

## 2018-04-15 RX ORDER — ONDANSETRON 4 MG/1
1 TABLET, ORALLY DISINTEGRATING ORAL EVERY 8 HOURS PRN
COMMUNITY
Start: 2018-01-20 | End: 2018-07-17

## 2018-04-15 RX ORDER — FLUTICASONE PROPIONATE 44 UG/1
2 AEROSOL, METERED RESPIRATORY (INHALATION) EVERY 12 HOURS
COMMUNITY
Start: 2013-11-13 | End: 2021-07-20

## 2018-04-15 NOTE — PATIENT INSTRUCTIONS
Follow up with your family doctor this week  Go to ER if any new or worsening symptoms occur  Abdominal Pain   WHAT YOU NEED TO KNOW:   Abdominal pain can be dull, achy, or sharp  You may have pain in one area of your abdomen, or in your entire abdomen  Your pain may be caused by a condition such as constipation, food sensitivity or poisoning, infection, or a blockage  Abdominal pain can also be from a hernia, appendicitis, or an ulcer  Liver, gallbladder, or kidney conditions can also cause abdominal pain  The cause of your abdominal pain may be unknown  DISCHARGE INSTRUCTIONS:   Return to the emergency department if:   · You have new chest pain or shortness of breath  · You have pulsing pain in your upper abdomen or lower back that suddenly becomes constant  · Your pain is in the right lower abdominal area and worsens with movement  · You have a fever over 100 4°F (38°C) or shaking chills  · You are vomiting and cannot keep food or liquids down  · Your pain does not improve or gets worse over the next 8 to 12 hours  · You see blood in your vomit or bowel movements, or they look black and tarry  · Your skin or the whites of your eyes turn yellow  · You are a woman and have a large amount of vaginal bleeding that is not your monthly period  Contact your healthcare provider if:   · You have pain in your lower back  · You are a man and have pain in your testicles  · You have pain when you urinate  · You have questions or concerns about your condition or care  Follow up with your healthcare provider within 24 hours or as directed:  Write down your questions so you remember to ask them during your visits  Medicines:   · Medicines  may be given to calm your stomach and prevent vomiting or to decrease pain  Ask how to take pain medicine safely  · Take your medicine as directed    Contact your healthcare provider if you think your medicine is not helping or if you have side effects  Tell him of her if you are allergic to any medicine  Keep a list of the medicines, vitamins, and herbs you take  Include the amounts, and when and why you take them  Bring the list or the pill bottles to follow-up visits  Carry your medicine list with you in case of an emergency  © 2017 [employer] Information is for End User's use only and may not be sold, redistributed or otherwise used for commercial purposes  All illustrations and images included in CareNotes® are the copyrighted property of A [employer]  or [employer]  The above information is an  only  It is not intended as medical advice for individual conditions or treatments  Talk to your doctor, nurse or pharmacist before following any medical regimen to see if it is safe and effective for you

## 2018-04-15 NOTE — LETTER
April 15, 2018     Patient: Troy Rose   YOB: 1995   Date of Visit: 4/15/2018       To Whom It May Concern: It is my medical opinion that Malcolm Lama may return to full duty immediately with no restrictions  If you have any questions or concerns, please don't hesitate to call           Sincerely,        David Reyes PA-C    CC: No Recipients

## 2018-04-15 NOTE — ED NOTES
Pt returned to ED for change to work note  States employer would not accept work note as written due to inconsistent dates and restrictions noted    Work note updated to reflect date of return as 04/15/2018 with no restrictions     Colten Asher RN  04/15/18 5659

## 2018-04-15 NOTE — PROGRESS NOTES
[facility]        NAME: Adri Chacon is a 21 y o  male  : 1995    MRN: [id number]  DATE: April 15, 2018  TIME: 9:15 AM    Assessment and Plan   Abdominal pain, periumbilical [T70 62]  1  Abdominal pain, periumbilical           Patient Instructions       Follow up with PCP in 3-5 days  Proceed to  ER if symptoms worsen  Chief Complaint     Chief Complaint   Patient presents with    Abdominal Pain     Patient was seen 11 days ago for torn abdominal muscle at [place] [address] here today for f/u and return to work note for his company         History of Present Illness       20 yo M presents 11 days after ER visit for periumbilical abdominal pain  Pt had CT done, no hernia or any other findings  He was placed on work restrictions until followup  He was told to followup at urgent care  Pt states that since his ER visit he has been completely pain free  He has not taken any medications  Eating and drinking normally  Moving bowels normally  States that he feels back to normal and has no complaints at this time  Review of Systems   Review of Systems   Constitutional: Negative for chills, fatigue and fever  HENT: Negative  Eyes: Negative  Respiratory: Negative for shortness of breath and wheezing  Cardiovascular: Negative  Negative for chest pain and palpitations  Gastrointestinal: Negative for abdominal distention, abdominal pain, blood in stool, constipation, diarrhea, nausea and vomiting  Endocrine: Negative  Genitourinary: Negative for dysuria and hematuria  Musculoskeletal: Negative  Skin: Negative for rash  Allergic/Immunologic: Negative  Neurological: Negative  Negative for light-headedness and headaches  Hematological: Negative  Psychiatric/Behavioral: Negative            Current Medications       Current Outpatient Prescriptions:     albuterol (VENTOLIN HFA) 90 mcg/act inhaler, Inhale 2 puffs, Disp: , Rfl:     fluticasone (FLOVENT HFA) 44 mcg/act inhaler, Inhale 2 puffs every 12 (twelve) hours, Disp: , Rfl:     ondansetron (ZOFRAN-ODT) 4 mg disintegrating tablet, Take 1 tablet by mouth every 8 (eight) hours as needed, Disp: , Rfl:     Current Allergies     Allergies as of 04/15/2018    (No Known Allergies)            The following portions of the patient's history were reviewed and updated as appropriate: allergies, current medications, past family history, past medical history, past social history, past surgical history and problem list      Past Medical History:   Diagnosis Date    Ankle fracture     right    Mild intermittent asthma        Past Surgical History:   Procedure Laterality Date    TOOTH EXTRACTION         Family History   Problem Relation Age of Onset    Diabetes Mother     Breast cancer Mother     Diabetes Father     Diabetes Sister     Diabetes Brother          Medications have been verified  Objective   /80 (BP Location: Right arm, Patient Position: Sitting, Cuff Size: Standard)   Pulse 88   Temp 97 8 °F (36 6 °C) (Temporal)   Resp 20   Ht 5' 11" (1 803 m)   Wt (!) 149 kg (328 lb)   SpO2 98%   BMI 45 75 kg/m²        Physical Exam     Physical Exam   Constitutional: He is oriented to person, place, and time  He appears well-developed and well-nourished  No distress  HENT:   Head: Normocephalic and atraumatic  Nose: Nose normal    Mouth/Throat: Oropharynx is clear and moist  No oropharyngeal exudate  Cardiovascular: Normal rate, regular rhythm, normal heart sounds and intact distal pulses  Pulmonary/Chest: Effort normal and breath sounds normal  No respiratory distress  He has no wheezes  He has no rales  Abdominal: Soft  Bowel sounds are normal  He exhibits no distension and no mass  There is no tenderness  Musculoskeletal: He exhibits no deformity  Neurological: He is oriented to person, place, and time  Skin: Skin is warm  No rash noted  He is not diaphoretic     Nursing note and vitals reviewed  Pt agrees to followup with his PCP this week  Understands indcations to RTED

## 2018-07-17 ENCOUNTER — HOSPITAL ENCOUNTER (EMERGENCY)
Facility: HOSPITAL | Age: 23
Discharge: HOME | End: 2018-07-17
Attending: EMERGENCY MEDICINE | Admitting: EMERGENCY MEDICINE
Payer: COMMERCIAL

## 2018-07-17 ENCOUNTER — APPOINTMENT (EMERGENCY)
Dept: ULTRASOUND IMAGING | Facility: HOSPITAL | Age: 23
End: 2018-07-17
Payer: COMMERCIAL

## 2018-07-17 VITALS
WEIGHT: 315 LBS | OXYGEN SATURATION: 95 % | TEMPERATURE: 98.7 F | DIASTOLIC BLOOD PRESSURE: 72 MMHG | RESPIRATION RATE: 18 BRPM | HEART RATE: 99 BPM | SYSTOLIC BLOOD PRESSURE: 138 MMHG | BODY MASS INDEX: 51.75 KG/M2

## 2018-07-17 DIAGNOSIS — M76.899 QUADRICEPS TENDONITIS: Primary | ICD-10-CM

## 2018-07-17 DIAGNOSIS — R10.11 RUQ PAIN: ICD-10-CM

## 2018-07-17 LAB
ALBUMIN SERPL BCP-MCNC: 3.9 G/DL (ref 3.5–5)
ALP SERPL-CCNC: 66 U/L (ref 46–116)
ALT SERPL W P-5'-P-CCNC: 89 U/L (ref 12–78)
ANION GAP SERPL CALCULATED.3IONS-SCNC: 4 MMOL/L (ref 4–13)
AST SERPL W P-5'-P-CCNC: 33 U/L (ref 5–45)
BASOPHILS # BLD AUTO: 0.01 THOUSANDS/ΜL (ref 0–0.1)
BASOPHILS NFR BLD AUTO: 0 % (ref 0–1)
BILIRUB SERPL-MCNC: 0.3 MG/DL (ref 0.2–1)
BUN SERPL-MCNC: 11 MG/DL (ref 5–25)
CALCIUM SERPL-MCNC: 9.3 MG/DL (ref 8.3–10.1)
CHLORIDE SERPL-SCNC: 103 MMOL/L (ref 100–108)
CO2 SERPL-SCNC: 33 MMOL/L (ref 21–32)
CREAT SERPL-MCNC: 0.98 MG/DL (ref 0.6–1.3)
EOSINOPHIL # BLD AUTO: 0.31 THOUSAND/ΜL (ref 0–0.61)
EOSINOPHIL NFR BLD AUTO: 4 % (ref 0–6)
ERYTHROCYTE [DISTWIDTH] IN BLOOD BY AUTOMATED COUNT: 12.8 % (ref 11.6–15.1)
GFR SERPL CREATININE-BSD FRML MDRD: 108 ML/MIN/1.73SQ M
GLUCOSE SERPL-MCNC: 98 MG/DL (ref 65–140)
HCT VFR BLD AUTO: 47.2 % (ref 36.5–49.3)
HGB BLD-MCNC: 15.5 G/DL (ref 12–17)
LIPASE SERPL-CCNC: 91 U/L (ref 73–393)
LYMPHOCYTES # BLD AUTO: 2.29 THOUSANDS/ΜL (ref 0.6–4.47)
LYMPHOCYTES NFR BLD AUTO: 30 % (ref 14–44)
MCH RBC QN AUTO: 29.5 PG (ref 26.8–34.3)
MCHC RBC AUTO-ENTMCNC: 32.8 G/DL (ref 31.4–37.4)
MCV RBC AUTO: 90 FL (ref 82–98)
MONOCYTES # BLD AUTO: 0.56 THOUSAND/ΜL (ref 0.17–1.22)
MONOCYTES NFR BLD AUTO: 7 % (ref 4–12)
NEUTROPHILS # BLD AUTO: 4.38 THOUSANDS/ΜL (ref 1.85–7.62)
NEUTS SEG NFR BLD AUTO: 58 % (ref 43–75)
PLATELET # BLD AUTO: 214 THOUSANDS/UL (ref 149–390)
PMV BLD AUTO: 9.9 FL (ref 8.9–12.7)
POTASSIUM SERPL-SCNC: 4.1 MMOL/L (ref 3.5–5.3)
PROT SERPL-MCNC: 7.4 G/DL (ref 6.4–8.2)
RBC # BLD AUTO: 5.25 MILLION/UL (ref 3.88–5.62)
SODIUM SERPL-SCNC: 140 MMOL/L (ref 136–145)
WBC # BLD AUTO: 7.55 THOUSAND/UL (ref 4.31–10.16)

## 2018-07-17 PROCEDURE — 99284 EMERGENCY DEPT VISIT MOD MDM: CPT

## 2018-07-17 PROCEDURE — 83690 ASSAY OF LIPASE: CPT | Performed by: EMERGENCY MEDICINE

## 2018-07-17 PROCEDURE — 76705 ECHO EXAM OF ABDOMEN: CPT

## 2018-07-17 PROCEDURE — 36415 COLL VENOUS BLD VENIPUNCTURE: CPT | Performed by: EMERGENCY MEDICINE

## 2018-07-17 PROCEDURE — 85025 COMPLETE CBC W/AUTO DIFF WBC: CPT | Performed by: EMERGENCY MEDICINE

## 2018-07-17 PROCEDURE — 80053 COMPREHEN METABOLIC PANEL: CPT | Performed by: EMERGENCY MEDICINE

## 2018-07-17 RX ORDER — IBUPROFEN 400 MG/1
400 TABLET ORAL ONCE
Status: COMPLETED | OUTPATIENT
Start: 2018-07-17 | End: 2018-07-17

## 2018-07-17 RX ADMIN — IBUPROFEN 400 MG: 400 TABLET, FILM COATED ORAL at 19:52

## 2018-07-17 NOTE — ED PROVIDER NOTES
History  Chief Complaint   Patient presents with    Knee Pain     left knee pain x 1 5 weeks  "popped" at work  painful when ambulating       History provided by:  Patient   used: No     77-year-old male presented for evaluation of left knee pain over the last 10 days  Denies any specific injury to the head been sore around the area of the quadriceps tendon and today at work when he stood up he felt a pop and has become more painful with attempted flexion  No fever chills, weakness  No paresthesias  On exam he has some tenderness over the quadriceps tendon  He is able to extend the knee without difficulty but passive flexion causes pain  No fusion or joint line tenderness  No ligamentous laxity  Suspect a quadriceps tendinitis  Will place a knee immobilizer, have follow-up with Orthopedics  Patient also reports that he has been having right upper quadrant pain fairly consistently for the last 2 months worse when eating, feels the cold sensation and then a burning sensation  Will also check labs in the right upper quadrant ultrasound to rule out any acute biliary pathology  Prior to Admission Medications   Prescriptions Last Dose Informant Patient Reported? Taking? albuterol (VENTOLIN HFA) 90 mcg/act inhaler   Yes No   Sig: Inhale 2 puffs   fluticasone (FLOVENT HFA) 44 mcg/act inhaler   Yes No   Sig: Inhale 2 puffs every 12 (twelve) hours      Facility-Administered Medications: None       Past Medical History:   Diagnosis Date    Ankle fracture     right    Mild intermittent asthma        Past Surgical History:   Procedure Laterality Date    TOOTH EXTRACTION         Family History   Problem Relation Age of Onset    Diabetes Mother     Breast cancer Mother     Diabetes Father     Diabetes Sister     Diabetes Brother      I have reviewed and agree with the history as documented      Social History   Substance Use Topics    Smoking status: Former Smoker    Smokeless tobacco: Former User    Alcohol use Yes      Comment: socially        Review of Systems   Constitutional: Negative for activity change, appetite change, fatigue and fever  Respiratory: Negative for chest tightness and shortness of breath  Gastrointestinal: Positive for abdominal pain  Negative for nausea and vomiting  Musculoskeletal: Positive for gait problem  Negative for back pain  Skin: Negative for color change and rash  Neurological: Negative for dizziness, weakness and headaches  All other systems reviewed and are negative  Physical Exam  Physical Exam   Constitutional: He is oriented to person, place, and time  He appears well-developed and well-nourished  No distress  HENT:   Head: Normocephalic  Mouth/Throat: Oropharynx is clear and moist    Cardiovascular: Normal rate and regular rhythm  Pulmonary/Chest: Effort normal  No respiratory distress  Abdominal: Soft  He exhibits no distension  Mild right upper quadrant tenderness  No rebound or guarding  Musculoskeletal:   Decreased passive and active flexion of the left knee due to pain  Tenderness over the quadriceps tendon  No effusion or joint line tenderness  No laxity  Neurological: He is alert and oriented to person, place, and time  No sensory deficit  He exhibits normal muscle tone  Skin: Skin is warm and dry  No rash noted  Psychiatric: He has a normal mood and affect  His behavior is normal    Nursing note and vitals reviewed        Vital Signs  ED Triage Vitals [07/17/18 1809]   Temperature Pulse Respirations Blood Pressure SpO2   98 7 °F (37 1 °C) 95 18 126/95 96 %      Temp Source Heart Rate Source Patient Position - Orthostatic VS BP Location FiO2 (%)   Oral Monitor Sitting Right arm --      Pain Score       8           Vitals:    07/17/18 1809 07/17/18 1815 07/17/18 2115 07/17/18 2116   BP: 126/95 126/95 138/72 138/72   Pulse: 95 (!) 110 102 99   Patient Position - Orthostatic VS: Sitting  Lying Sitting       Visual Acuity      ED Medications  Medications   ibuprofen (MOTRIN) tablet 400 mg (400 mg Oral Given 7/17/18 1952)       Diagnostic Studies  Results Reviewed     Procedure Component Value Units Date/Time    Comprehensive metabolic panel [[id number]]  (Abnormal) Collected:  07/17/18 1935    Lab Status:  Final result Specimen:  Blood from Arm, Right Updated:  07/17/18 2007     Sodium 140 mmol/L      Potassium 4 1 mmol/L      Chloride 103 mmol/L      CO2 33 (H) mmol/L      Anion Gap 4 mmol/L      BUN 11 mg/dL      Creatinine 0 98 mg/dL      Glucose 98 mg/dL      Calcium 9 3 mg/dL      AST 33 U/L      ALT 89 (H) U/L      Alkaline Phosphatase 66 U/L      Total Protein 7 4 g/dL      Albumin 3 9 g/dL      Total Bilirubin 0 30 mg/dL      eGFR 108 ml/min/1 73sq m     Narrative:         National Kidney Disease Education Program recommendations are as follows:  GFR calculation is accurate only with a steady state creatinine  Chronic Kidney disease less than 60 ml/min/1 73 sq  meters  Kidney failure less than 15 ml/min/1 73 sq  meters      Lipase [[id number]]  (Normal) Collected:  07/17/18 1935    Lab Status:  Final result Specimen:  Blood from Arm, Right Updated:  07/17/18 2007     Lipase 91 u/L     CBC and differential [[id number]]  (Normal) Collected:  07/17/18 1935    Lab Status:  Final result Specimen:  Blood from Arm, Right Updated:  07/17/18 1942     WBC 7 55 Thousand/uL      RBC 5 25 Million/uL      Hemoglobin 15 5 g/dL      Hematocrit 47 2 %      MCV 90 fL      MCH 29 5 pg      MCHC 32 8 g/dL      RDW 12 8 %      MPV 9 9 fL      Platelets 876 Thousands/uL      Neutrophils Relative 58 %      Lymphocytes Relative 30 %      Monocytes Relative 7 %      Eosinophils Relative 4 %      Basophils Relative 0 %      Neutrophils Absolute 4 38 Thousands/µL      Lymphocytes Absolute 2 29 Thousands/µL      Monocytes Absolute 0 56 Thousand/µL      Eosinophils Absolute 0 31 Thousand/µL      Basophils Absolute 0 01 Thousands/µL                  US gallbladder   Final Result by Manuela Ellis MD (81/30 1923)      1  Technically limited study due to patient body habitus  2   Pancreas not satisfactorily evaluated  3   Probable hepatic steatosis  4   Otherwise unremarkable sonogram of the right upper quadrant of the abdomen  Workstation performed: [id number]                    Procedures  Procedures       Phone Contacts  ED Phone Contact    ED Course                               MDM  Number of Diagnoses or Management Options  Quadriceps tendonitis:   RUQ pain:   Diagnosis management comments:  27-year-old otherwise healthy male presented with left knee pain for the past 10 days and worsening today  On exam he has tenderness over the quadriceps tendon and pain with passive movement  No laxity or effusion  Placed in knee immobilizer, will follow up with Orthopedics  He also mentioned right upper quadrant pain  Ultrasound lab work were unremarkable other than for fatty liver  Patient advised to follow up with GI  Amount and/or Complexity of Data Reviewed  Clinical lab tests: reviewed and ordered  Tests in the radiology section of CPT®: ordered and reviewed    Patient Progress  Patient progress: stable    CritCare Time    Disposition  Final diagnoses:   Quadriceps tendonitis   RUQ pain     Time reflects when diagnosis was documented in both MDM as applicable and the Disposition within this note     Time User Action Codes Description Comment    7/17/2018  9:02 PM Precilla Car Add [C48 732] Quadriceps tendonitis     7/17/2018  9:02 PM Puma Carlson Add [R10 11] RUQ pain       ED Disposition     ED Disposition Condition Comment    Discharge  Rush Cesia Croft discharge to home/self care      Condition at discharge: Good        Follow-up Information     Follow up With Specialties Details Why [address] [facility]   [address] [facility] [address] [address] [facility]   [address] [facility] [address] [address]  [phone] [address] [facility]   [address] [facility] [address] [address]  [phone]          Discharge Medication List as of 7/17/2018  9:03 PM      CONTINUE these medications which have NOT CHANGED    Details   albuterol (VENTOLIN HFA) 90 mcg/act inhaler Inhale 2 puffs, Historical Med      fluticasone (FLOVENT HFA) 44 mcg/act inhaler Inhale 2 puffs every 12 (twelve) hours, Starting Wed 11/13/2013, Historical Med           No discharge procedures on file      ED Provider  Electronically Signed by           Roxana Marvin MD  07/18/18 6288

## 2018-07-18 NOTE — ED NOTES
Pt ambulated out of department despite being given crutches, states that he will use the crutches tomorrow       Betty Choi RN  07/17/18 6337

## 2018-07-18 NOTE — DISCHARGE INSTRUCTIONS
Abdominal Pain   WHAT YOU NEED TO KNOW:   Abdominal pain can be dull, achy, or sharp  You may have pain in one area of your abdomen, or in your entire abdomen  Your pain may be caused by a condition such as constipation, food sensitivity or poisoning, infection, or a blockage  Abdominal pain can also be from a hernia, appendicitis, or an ulcer  Liver, gallbladder, or kidney conditions can also cause abdominal pain  The cause of your abdominal pain may be unknown  DISCHARGE INSTRUCTIONS:   Return to the emergency department if:   · You have new chest pain or shortness of breath  · You have pulsing pain in your upper abdomen or lower back that suddenly becomes constant  · Your pain is in the right lower abdominal area and worsens with movement  · You have a fever over 100 4°F (38°C) or shaking chills  · You are vomiting and cannot keep food or liquids down  · Your pain does not improve or gets worse over the next 8 to 12 hours  · You see blood in your vomit or bowel movements, or they look black and tarry  · Your skin or the whites of your eyes turn yellow  · You are a woman and have a large amount of vaginal bleeding that is not your monthly period  Contact your healthcare provider if:   · You have pain in your lower back  · You are a man and have pain in your testicles  · You have pain when you urinate  · You have questions or concerns about your condition or care  Follow up with your healthcare provider within 24 hours or as directed:  Write down your questions so you remember to ask them during your visits  Medicines:   · Medicines  may be given to calm your stomach and prevent vomiting or to decrease pain  Ask how to take pain medicine safely  · Take your medicine as directed  Contact your healthcare provider if you think your medicine is not helping or if you have side effects  Tell him of her if you are allergic to any medicine   Keep a list of the medicines, vitamins, and herbs you take  Include the amounts, and when and why you take them  Bring the list or the pill bottles to follow-up visits  Carry your medicine list with you in case of an emergency  © 2017 [address] Information is for End User's use only and may not be sold, redistributed or otherwise used for commercial purposes  All illustrations and images included in CareNotes® are the copyrighted property of A D A M , Inc  or [employer]  The above information is an  only  It is not intended as medical advice for individual conditions or treatments  Talk to your doctor, nurse or pharmacist before following any medical regimen to see if it is safe and effective for you  Tendinitis   WHAT YOU NEED TO KNOW:   Tendinitis is painful inflammation or breakdown of your tendons  It may also be called tendinopathy  Tendinitis often occurs in the knee, shoulder, ankle, hip, or elbow  DISCHARGE INSTRUCTIONS:   Medicines:   · Pain medicines  such as acetaminophen or NSAIDs may decrease swelling and pain or fever  These medicines are available without a doctor's order  Ask which medicine to take  Ask how much to take and when to take it  Follow directions  Acetaminophen and NSAIDs can cause liver or kidney damage if not taken correctly  If you take blood thinner medicine, always ask your healthcare provider if NSAIDs are safe for you  Always read the medicine label and follow the directions on it before using these medicine  · Take your medicine as directed  Contact your healthcare provider if you think your medicine is not helping or if you have side effects  Tell him if you are allergic to any medicine  Keep a list of the medicines, vitamins, and herbs you take  Include the amounts, and when and why you take them  Bring the list or the pill bottles to follow-up visits  Carry your medicine list with you in case of an emergency    Management:   · Rest  your tendon as directed to help it heal  Ask your healthcare provider if you need to stop putting weight on your affected area  · Ice  helps decrease swelling and pain  Ice may also help prevent tissue damage  Use an ice pack, or put crushed ice in a plastic bag  Cover it with a towel and place it on the affected area for 10 to 15 minutes every hour or as directed  · Support devices  such as a cane, splint, shoe insert, or brace may help reduce your pain  · Physical therapy  may be ordered by your healthcare provider  This may be used to teach you exercises to help improve movement and strength, and to decrease pain  You may also learn how to improve your posture, and how to lift or exercise correctly  Prevention:   · Stretch and warm up  before you exercise  · Exercise regularly  to strengthen the muscles around your joint  Ease into an exercise routine for the first 3 weeks to prevent another injury  Ask your healthcare provider about the best exercise plan for you  Rest fully between activities  · Use the right equipment  for sports and exercise  Wear braces or tape around weak joints as directed  Follow up with your healthcare provider as directed:  Write down your questions so you remember to ask them during your visits  Contact your healthcare provider if:   · You have increased pain even after you take medicine  · You have questions or concerns about your condition or care  Return to the emergency department if:   · You have increased redness over the joint, or swelling in the joint  · You suddenly cannot move your joint  © 2017 [address] Information is for End User's use only and may not be sold, redistributed or otherwise used for commercial purposes  All illustrations and images included in CareNotes® are the copyrighted property of A D A M , Inc  or [employer] Jim[employer]nez  The above information is an  only   It is not intended as medical advice for individual conditions or treatments  Talk to your doctor, nurse or pharmacist before following any medical regimen to see if it is safe and effective for you

## 2018-07-19 ENCOUNTER — OFFICE VISIT (OUTPATIENT)
Dept: URGENT CARE | Age: 23
End: 2018-07-19
Payer: COMMERCIAL

## 2018-07-19 VITALS
BODY MASS INDEX: 44.1 KG/M2 | DIASTOLIC BLOOD PRESSURE: 79 MMHG | WEIGHT: 315 LBS | RESPIRATION RATE: 20 BRPM | SYSTOLIC BLOOD PRESSURE: 135 MMHG | OXYGEN SATURATION: 96 % | TEMPERATURE: 98 F | HEIGHT: 71 IN | HEART RATE: 95 BPM

## 2018-07-19 DIAGNOSIS — S50.11XA TRAUMATIC HEMATOMA OF RIGHT FOREARM, INITIAL ENCOUNTER: Primary | ICD-10-CM

## 2018-07-19 DIAGNOSIS — R20.2 PARESTHESIA OF RIGHT UPPER EXTREMITY: ICD-10-CM

## 2018-07-19 PROCEDURE — G0382 LEV 3 HOSP TYPE B ED VISIT: HCPCS | Performed by: FAMILY MEDICINE

## 2018-07-19 PROCEDURE — 99283 EMERGENCY DEPT VISIT LOW MDM: CPT | Performed by: FAMILY MEDICINE

## 2018-07-19 RX ORDER — PREDNISONE 10 MG/1
TABLET ORAL
Qty: 21 TABLET | Refills: 0 | Status: SHIPPED | OUTPATIENT
Start: 2018-07-19 | End: 2018-08-30 | Stop reason: ALTCHOICE

## 2018-07-19 NOTE — PROGRESS NOTES
[facility]        NAME: Dung Velazquez is a 21 y o  male  : 1995    MRN: [id number]  DATE: 2018  TIME: 6:14 PM    Assessment and Plan   Traumatic hematoma of right forearm, initial encounter [S50 11XA]  1  Traumatic hematoma of right forearm, initial encounter  predniSONE 10 mg tablet   2  Paresthesia of right upper extremity  predniSONE 10 mg tablet         Patient Instructions       Follow up with PCP in 3-5 days  Proceed to  ER if symptoms worsen  Chief Complaint     Chief Complaint   Patient presents with    Hand Pain     right arm swelling and fingers numbeness it happened today  History of Present Illness       Patient is here for evaluation of bruising and swelling in his right forearm  Patient was seen in [facility] emergency room about a day and half ago and had an IV placed in his right arm  Patient had a couple people make multiple attempts at getting IV even with and vein finder  Patient states they were digging around to find the vein  Patient had some swelling and bruising as well as tingling in his fingertips  Patient does have a history of carpal tunnel but has not had tingling like this before  Review of Systems   Review of Systems   Constitutional: Negative  Neurological: Negative for tremors and weakness           Current Medications       Current Outpatient Prescriptions:     albuterol (VENTOLIN HFA) 90 mcg/act inhaler, Inhale 2 puffs, Disp: , Rfl:     fluticasone (FLOVENT HFA) 44 mcg/act inhaler, Inhale 2 puffs every 12 (twelve) hours, Disp: , Rfl:     predniSONE 10 mg tablet, Six tablets day 1; 5 tablets day to; 4 tablets day 3; 3 tablets day 4; 2 tablets day 5; and 1 tablet day 6, Disp: 21 tablet, Rfl: 0    Current Allergies     Allergies as of 2018    (No Known Allergies)            The following portions of the patient's history were reviewed and updated as appropriate: allergies, current medications, past family history, past medical history, past social history, past surgical history and problem list      Past Medical History:   Diagnosis Date    Ankle fracture     right    Mild intermittent asthma        Past Surgical History:   Procedure Laterality Date    TOOTH EXTRACTION         Family History   Problem Relation Age of Onset    Diabetes Mother    Kae Mendez Breast cancer Mother     Diabetes Father     Diabetes Sister     Diabetes Brother          Medications have been verified  Objective   /79   Pulse 95   Temp 98 °F (36 7 °C) (Temporal)   Resp 20   Ht 5' 11" (1 803 m)   Wt (!) 168 kg (370 lb)   SpO2 96%   BMI 51 60 kg/m²        Physical Exam     Physical Exam   Constitutional: He is oriented to person, place, and time  He appears well-developed and well-nourished  HENT:   Head: Normocephalic and atraumatic  Musculoskeletal:        Arms:  Range of motion of the right hand wrist and of elbow are intact with slight limitations in the hand and wrist due to some focal swelling  Patient does have ecchymosis extending from the elbow down to the wrist as noted in the diagram   2 IV sites noted in the right antecubital region   Neurological: He is alert and oriented to person, place, and time  Skin: Skin is warm and dry  Psychiatric: He has a normal mood and affect  His behavior is normal    Nursing note and vitals reviewed

## 2018-07-19 NOTE — LETTER
July 19, 2018      Patient: Chencho Osei   YOB: 1995   Date of Visit: 7/19/2018       To Whom It May Concern:     John Hanson was seen in my office today for medical evaluation  Please excuse from work today 07/19/2018           Sincerely,        Sumeet Greenberg PA-C    CC: No Recipients

## 2018-07-19 NOTE — PATIENT INSTRUCTIONS
Continue to ice and elevate for the next 24 hours then may start using moist heat as directed      Follow-up with orthopedics if symptoms are persisting as you may need an EMG

## 2018-08-30 ENCOUNTER — HOSPITAL ENCOUNTER (EMERGENCY)
Facility: HOSPITAL | Age: 23
Discharge: HOME | End: 2018-08-30
Attending: EMERGENCY MEDICINE | Admitting: EMERGENCY MEDICINE
Payer: COMMERCIAL

## 2018-08-30 ENCOUNTER — APPOINTMENT (EMERGENCY)
Dept: CT IMAGING | Facility: HOSPITAL | Age: 23
End: 2018-08-30
Payer: COMMERCIAL

## 2018-08-30 VITALS
WEIGHT: 315 LBS | TEMPERATURE: 98.7 F | BODY MASS INDEX: 49.44 KG/M2 | SYSTOLIC BLOOD PRESSURE: 143 MMHG | RESPIRATION RATE: 18 BRPM | HEIGHT: 67 IN | OXYGEN SATURATION: 97 % | DIASTOLIC BLOOD PRESSURE: 84 MMHG | HEART RATE: 95 BPM

## 2018-08-30 DIAGNOSIS — K62.5 RECTAL BLEEDING: Primary | ICD-10-CM

## 2018-08-30 DIAGNOSIS — R79.89 ELEVATED TSH: ICD-10-CM

## 2018-08-30 DIAGNOSIS — K76.0 FATTY LIVER: ICD-10-CM

## 2018-08-30 DIAGNOSIS — K29.70 GASTRITIS: ICD-10-CM

## 2018-08-30 LAB
ALBUMIN SERPL BCP-MCNC: 3.6 G/DL (ref 3.5–5)
ALP SERPL-CCNC: 74 U/L (ref 46–116)
ALT SERPL W P-5'-P-CCNC: 74 U/L (ref 12–78)
ANION GAP SERPL CALCULATED.3IONS-SCNC: 7 MMOL/L (ref 4–13)
APTT PPP: 29 SECONDS (ref 24–36)
AST SERPL W P-5'-P-CCNC: 31 U/L (ref 5–45)
BASOPHILS # BLD AUTO: 0.02 THOUSANDS/ΜL (ref 0–0.1)
BASOPHILS NFR BLD AUTO: 0 % (ref 0–1)
BILIRUB SERPL-MCNC: 0.2 MG/DL (ref 0.2–1)
BUN SERPL-MCNC: 15 MG/DL (ref 5–25)
CALCIUM SERPL-MCNC: 9.2 MG/DL (ref 8.3–10.1)
CHLORIDE SERPL-SCNC: 104 MMOL/L (ref 100–108)
CO2 SERPL-SCNC: 29 MMOL/L (ref 21–32)
CREAT SERPL-MCNC: 0.96 MG/DL (ref 0.6–1.3)
EOSINOPHIL # BLD AUTO: 0.35 THOUSAND/ΜL (ref 0–0.61)
EOSINOPHIL NFR BLD AUTO: 5 % (ref 0–6)
ERYTHROCYTE [DISTWIDTH] IN BLOOD BY AUTOMATED COUNT: 12.6 % (ref 11.6–15.1)
GFR SERPL CREATININE-BSD FRML MDRD: 111 ML/MIN/1.73SQ M
GLUCOSE SERPL-MCNC: 117 MG/DL (ref 65–140)
HCT VFR BLD AUTO: 46.3 % (ref 36.5–49.3)
HGB BLD-MCNC: 14.8 G/DL (ref 12–17)
IMM GRANULOCYTES # BLD AUTO: 0.02 THOUSAND/UL (ref 0–0.2)
IMM GRANULOCYTES NFR BLD AUTO: 0 % (ref 0–2)
INR PPP: 1.01 (ref 0.86–1.17)
LACTATE SERPL-SCNC: 1.3 MMOL/L (ref 0.5–2)
LYMPHOCYTES # BLD AUTO: 2.41 THOUSANDS/ΜL (ref 0.6–4.47)
LYMPHOCYTES NFR BLD AUTO: 32 % (ref 14–44)
MCH RBC QN AUTO: 29.3 PG (ref 26.8–34.3)
MCHC RBC AUTO-ENTMCNC: 32 G/DL (ref 31.4–37.4)
MCV RBC AUTO: 92 FL (ref 82–98)
MONOCYTES # BLD AUTO: 0.59 THOUSAND/ΜL (ref 0.17–1.22)
MONOCYTES NFR BLD AUTO: 8 % (ref 4–12)
NEUTROPHILS # BLD AUTO: 4.25 THOUSANDS/ΜL (ref 1.85–7.62)
NEUTS SEG NFR BLD AUTO: 55 % (ref 43–75)
NRBC BLD AUTO-RTO: 0 /100 WBCS
PLATELET # BLD AUTO: 222 THOUSANDS/UL (ref 149–390)
PMV BLD AUTO: 9.6 FL (ref 8.9–12.7)
POTASSIUM SERPL-SCNC: 3.9 MMOL/L (ref 3.5–5.3)
PROT SERPL-MCNC: 6.9 G/DL (ref 6.4–8.2)
PROTHROMBIN TIME: 13 SECONDS (ref 11.8–14.2)
RBC # BLD AUTO: 5.05 MILLION/UL (ref 3.88–5.62)
SODIUM SERPL-SCNC: 140 MMOL/L (ref 136–145)
TSH SERPL DL<=0.05 MIU/L-ACNC: 4.38 UIU/ML (ref 0.36–3.74)
WBC # BLD AUTO: 7.64 THOUSAND/UL (ref 4.31–10.16)

## 2018-08-30 PROCEDURE — 82272 OCCULT BLD FECES 1-3 TESTS: CPT

## 2018-08-30 PROCEDURE — 85610 PROTHROMBIN TIME: CPT | Performed by: EMERGENCY MEDICINE

## 2018-08-30 PROCEDURE — 80053 COMPREHEN METABOLIC PANEL: CPT | Performed by: EMERGENCY MEDICINE

## 2018-08-30 PROCEDURE — 84443 ASSAY THYROID STIM HORMONE: CPT | Performed by: EMERGENCY MEDICINE

## 2018-08-30 PROCEDURE — 83605 ASSAY OF LACTIC ACID: CPT | Performed by: EMERGENCY MEDICINE

## 2018-08-30 PROCEDURE — 36415 COLL VENOUS BLD VENIPUNCTURE: CPT | Performed by: EMERGENCY MEDICINE

## 2018-08-30 PROCEDURE — 74177 CT ABD & PELVIS W/CONTRAST: CPT

## 2018-08-30 PROCEDURE — 99285 EMERGENCY DEPT VISIT HI MDM: CPT

## 2018-08-30 PROCEDURE — 96360 HYDRATION IV INFUSION INIT: CPT

## 2018-08-30 PROCEDURE — 85025 COMPLETE CBC W/AUTO DIFF WBC: CPT | Performed by: EMERGENCY MEDICINE

## 2018-08-30 PROCEDURE — 85730 THROMBOPLASTIN TIME PARTIAL: CPT | Performed by: EMERGENCY MEDICINE

## 2018-08-30 RX ORDER — PANTOPRAZOLE SODIUM 40 MG/1
40 TABLET, DELAYED RELEASE ORAL DAILY
Qty: 20 TABLET | Refills: 0 | Status: SHIPPED | OUTPATIENT
Start: 2018-08-30 | End: 2020-04-28

## 2018-08-30 RX ORDER — ONDANSETRON 4 MG/1
4 TABLET, FILM COATED ORAL EVERY 6 HOURS
Qty: 12 TABLET | Refills: 0 | Status: SHIPPED | OUTPATIENT
Start: 2018-08-30 | End: 2019-01-30 | Stop reason: SDDI

## 2018-08-30 RX ORDER — PANTOPRAZOLE SODIUM 40 MG/1
40 TABLET, DELAYED RELEASE ORAL ONCE
Status: COMPLETED | OUTPATIENT
Start: 2018-08-30 | End: 2018-08-30

## 2018-08-30 RX ADMIN — PANTOPRAZOLE SODIUM 40 MG: 40 TABLET, DELAYED RELEASE ORAL at 22:49

## 2018-08-30 RX ADMIN — IOHEXOL 100 ML: 350 INJECTION, SOLUTION INTRAVENOUS at 22:09

## 2018-08-30 RX ADMIN — SODIUM CHLORIDE 1000 ML: 0.9 INJECTION, SOLUTION INTRAVENOUS at 21:26

## 2018-08-31 ENCOUNTER — TELEPHONE (OUTPATIENT)
Dept: FAMILY MEDICINE CLINIC | Facility: CLINIC | Age: 23
End: 2018-08-31

## 2018-08-31 ENCOUNTER — TELEPHONE (OUTPATIENT)
Dept: GASTROENTEROLOGY | Facility: CLINIC | Age: 23
End: 2018-08-31

## 2018-08-31 NOTE — DISCHARGE INSTRUCTIONS
Gastritis   WHAT YOU NEED TO KNOW:   Gastritis is inflammation or irritation of the lining of your stomach  DISCHARGE INSTRUCTIONS:   Call 911 for any of the following:   · You develop chest pain or shortness of breath  Seek care immediately if:   · You vomit blood  · You have black or bloody bowel movements  · You have severe stomach or back pain  Contact your healthcare provider if:   · You have a fever  · You have new or worsening symptoms, even after treatment  · You have questions or concerns about your condition or care  Medicines:   · Medicines  may be given to help treat a bacterial infection or decrease stomach acid  · Take your medicine as directed  Contact your healthcare provider if you think your medicine is not helping or if you have side effects  Tell him or her if you are allergic to any medicine  Keep a list of the medicines, vitamins, and herbs you take  Include the amounts, and when and why you take them  Bring the list or the pill bottles to follow-up visits  Carry your medicine list with you in case of an emergency  Manage or prevent gastritis:   · Do not smoke  Nicotine and other chemicals in cigarettes and cigars can make your symptoms worse and cause lung damage  Ask your healthcare provider for information if you currently smoke and need help to quit  E-cigarettes or smokeless tobacco still contain nicotine  Talk to your healthcare provider before you use these products  · Do not drink alcohol  Alcohol can prevent healing and make your gastritis worse  Talk to your healthcare provider if you need help to stop drinking  · Do not take NSAIDs or aspirin unless directed  These and similar medicines can cause irritation  If your healthcare provider says it is okay to take NSAIDs, take them with food  · Do not eat foods that cause irritation  Foods such as oranges and salsa can cause burning or pain  Eat a variety of healthy foods   Examples include fruits (not citrus), vegetables, low-fat dairy products, beans, whole-grain breads, and lean meats and fish  Try to eat small meals, and drink water with your meals  Do not eat for at least 3 hours before you go to bed  · Find ways to relax and decrease stress  Stress can increase stomach acid and make gastritis worse  Activities such as yoga, meditation, or listening to music can help you relax  Spend time with friends, or do things you enjoy  Follow up with your healthcare provider as directed: You may need ongoing tests or treatment, or referral to a gastroenterologist  Write down your questions so you remember to ask them during your visits  © 2017 [address] Information is for End User's use only and may not be sold, redistributed or otherwise used for commercial purposes  All illustrations and images included in CareNotes® are the copyrighted property of A D A M , Inc  or [employer] Jim[employer]  The above information is an  only  It is not intended as medical advice for individual conditions or treatments  Talk to your doctor, nurse or pharmacist before following any medical regimen to see if it is safe and effective for you  Non-Alcoholic Fatty Liver Disease   WHAT YOU NEED TO KNOW:   Non-alcoholic fatty liver disease (NAFLD) is a buildup of fat in your liver from a condition other than alcoholism  DISCHARGE INSTRUCTIONS:   Medicines:   · Medicines  may be given to manage your blood sugar or cholesterol levels  · Take your medicine as directed  Contact your healthcare provider if you think your medicine is not helping or if you have side effects  Tell him or her if you are allergic to any medicine  Keep a list of the medicines, vitamins, and herbs you take  Include the amounts, and when and why you take them  Bring the list or the pill bottles to follow-up visits  Carry your medicine list with you in case of an emergency    Follow up with your healthcare provider as directed: You may need to return for more tests  You may also be referred to a specialist  Write down your questions so you remember to ask them during your visits  Manage NAFLD:   · Maintain a healthy weight  Ask your healthcare provider how much you should weigh  Ask him to help you create a weight loss plan if you are overweight  · Exercise  Aerobic exercise 3 times a week for 20 to 45 minutes can help decrease fat buildup in your liver  Examples are cycling, brisk walking, or jogging  Ask your healthcare provider about the best exercise plan for you  · Eat healthy foods  Examples are vegetables, fruit, whole-grain breads, low-fat dairy products, beans, lean meats, and fish  Foods low in simple carbohydrates, high fructose corn syrup, and trans fat may help decrease fat buildup in your liver  · Do not drink alcohol  Alcohol may make NAFLD worse and harm your liver  Contact your healthcare provider if:   · You have increased pain or swelling in your abdomen  · You feel more tired than usual     · You bruise or bleed easily  · Your skin or the whites of your eyes look yellow  · You have questions or concerns about your condition or care  Seek care immediately or call 911 if:   · You have shortness of breath  · You have trouble thinking clearly or are confused  · You feel lightheaded or faint  · You have shaking, chills, and a fever  © 2017 [address] Information is for End User's use only and may not be sold, redistributed or otherwise used for commercial purposes  All illustrations and images included in CareNotes® are the copyrighted property of A D A M , Inc  or [employer] Jim[employer]nez  The above information is an  only  It is not intended as medical advice for individual conditions or treatments  Talk to your doctor, nurse or pharmacist before following any medical regimen to see if it is safe and effective for you      Rectal Bleeding   WHAT YOU NEED TO KNOW:   Rectal bleeding can be caused by constipation, hemorrhoids, or anal fissures  It may also be caused by polyps, tumors, or medical conditions, such as colitis or diverticulitis  DISCHARGE INSTRUCTIONS:   Medicines:   · Pain medicine: You may be given medicine to take away or decrease pain  Do not wait until the pain is severe before you take your medicine  · Iron supplement:  Iron helps your body make more red blood cells  · Steroids: This medicine decreases inflammation in your rectum  It may be applied as a cream, ointment, or lotion  · Take your medicine as directed  Contact your healthcare provider if you think your medicine is not helping or if you have side effects  Tell him or her if you are allergic to any medicine  Keep a list of the medicines, vitamins, and herbs you take  Include the amounts, and when and why you take them  Bring the list or the pill bottles to follow-up visits  Carry your medicine list with you in case of an emergency  Follow up with your healthcare provider as directed:  Write down your questions so you remember to ask them during your visits  Drink liquids as directed:  Ask your healthcare provider how much liquid to drink each day and which liquids are best for you  This will help prevent dehydration and constipation  Contact your healthcare provider if:   · You have a fever  · Your rectal bleeding stopped for a time, but has started again  · You have nausea  · You have cold, sweaty, pale skin  · You have changes in your bowel movements, such as diarrhea  · You have questions or concerns about your condition or care  Seek care immediately or call 911 if:   · You are breathing faster than usual     · You are dizzy, lightheaded, or feel faint  · You are confused or cannot think clearly  · You urinate less than usual or not at all  · Your rectal bleeding is constant or heavy      · You have severe abdominal pain or cramping  © 2017 [address] [facility] Information is for End User's use only and may not be sold, redistributed or otherwise used for commercial purposes  All illustrations and images included in CareNotes® are the copyrighted property of A D A M , Inc  or Byron Jim[employer]nez  The above information is an  only  It is not intended as medical advice for individual conditions or treatments  Talk to your doctor, nurse or pharmacist before following any medical regimen to see if it is safe and effective for you

## 2018-08-31 NOTE — TELEPHONE ENCOUNTER
New patient referral from ED that needs to be seen soon, request [facility] [place] location  He was seen for abd pain, rectal bleeding---call back # [phone]   Please assist

## 2018-08-31 NOTE — TELEPHONE ENCOUNTER
Patient is trying to see Dr Sal Medley as the family is trying to go to just one doctor, but the patient was in the hospital last night due to abdominal pain and black/bloody stool  Patient cannot get in with this doctor and has not established himself with him yet  Patient was advised to get an appointment to follow up within 2 days as he has an abnormal thyroid  I notified patient we are booked solid today and that we would not have an opening until next week  Patient's wife was adamant he be seen because that was what the paperwork states  Please advise

## 2018-09-02 NOTE — ED PROVIDER NOTES
History  Chief Complaint   Patient presents with    Black or Bloody Stool     patient reports having blood clots in bowel movements with abdominal pain and dizziness for 1 week  patient states abdominal pain right upper quadrant pain found "fatty liver, but nothing else"   Abdominal Pain     80-year-old male presents to the emergency department for evaluation of bloody stool  Patient reports having intermittent pain in the right upper quadrant of the abdomen  He has had this pain for many months and was told it was related to fatty liver however recently this pain is more intense and this is the 1st time he has passed blood in his stool  He describes it as bright red with some clot and mucousy material   He has been passing blood in his stool for several days, small quantities  Patient states that he passes large amounts of soft stool and notes that the blood is sometimes on the outside of the stool and sometimes mixed in it  He reports feeling slightly dizzy after passing the stool  No syncopal episodes  He does not take blood thinners or aspirin  He denies recent sick contacts or travel  No fevers or chills  No dysuria or hematuria  Appetite has been normal   Patient's significant other states that is the patient has been rapidly gaining weight despite not changing his diet            History provided by:  Patient   used: No    Abdominal Pain   Pain location:  RUQ, epigastric and LUQ  Pain quality: aching    Pain radiates to:  Does not radiate  Pain severity:  Moderate  Onset quality:  Gradual  Timing:  Intermittent  Progression:  Waxing and waning  Context: not previous surgeries and not recent illness    Relieved by:  Nothing  Worsened by:  Nothing  Ineffective treatments:  None tried  Associated symptoms: hematochezia    Associated symptoms: no anorexia, no chest pain, no constipation, no dysuria, no fever, no nausea and no vomiting    Risk factors: has not had multiple surgeries and no recent hospitalization        Prior to Admission Medications   Prescriptions Last Dose Informant Patient Reported? Taking? albuterol (VENTOLIN HFA) 90 mcg/act inhaler   Yes No   Sig: Inhale 2 puffs   fluticasone (FLOVENT HFA) 44 mcg/act inhaler   Yes No   Sig: Inhale 2 puffs every 12 (twelve) hours      Facility-Administered Medications: None       Past Medical History:   Diagnosis Date    Ankle fracture     right    Fall 6614-2824    "fell off of a roof"    Mild intermittent asthma        Past Surgical History:   Procedure Laterality Date    TOOTH EXTRACTION         Family History   Problem Relation Age of Onset    Diabetes Mother     Breast cancer Mother     Diabetes Father     Diabetes Sister     Diabetes Brother      I have reviewed and agree with the history as documented  Social History   Substance Use Topics    Smoking status: Former Smoker     Types: Cigarettes    Smokeless tobacco: Former User    Alcohol use Yes      Comment: socially        Review of Systems   Constitutional: Positive for unexpected weight change  Negative for fever  Cardiovascular: Negative for chest pain and leg swelling  Gastrointestinal: Positive for abdominal pain, blood in stool and hematochezia  Negative for abdominal distention, anorexia, constipation, nausea and vomiting  Genitourinary: Negative for dysuria  Musculoskeletal: Negative for back pain  Neurological: Positive for dizziness  Physical Exam  Physical Exam   Constitutional: He is oriented to person, place, and time  Vital signs are normal  He appears well-developed and well-nourished  Morbidly obese   HENT:   Head: Normocephalic and atraumatic  Eyes: Conjunctivae and EOM are normal  Pupils are equal, round, and reactive to light  Neck: Normal range of motion  Neck supple  Cardiovascular: Normal rate, regular rhythm, normal heart sounds and intact distal pulses      Pulmonary/Chest: Effort normal and breath sounds normal  No accessory muscle usage  No respiratory distress  He exhibits no tenderness  Abdominal: Soft  Normal appearance and bowel sounds are normal  He exhibits no distension  There is tenderness in the right upper quadrant and epigastric area  There is no rebound and no guarding  No hernia  Genitourinary: Rectal exam shows guaiac positive stool  Rectal exam shows no external hemorrhoid, no internal hemorrhoid, no mass and no tenderness  Genitourinary Comments: No stool in the rectal vault, smear is heme-positive slightly pink tinged mucus  No external hemorrhoids noted   Musculoskeletal: Normal range of motion  He exhibits no edema, tenderness or deformity  Lymphadenopathy:     He has no cervical adenopathy  Neurological: He is alert and oriented to person, place, and time  He has normal strength and normal reflexes  Coordination normal    Skin: Skin is warm, dry and intact  No rash noted  Psychiatric: He has a normal mood and affect  His behavior is normal  Judgment and thought content normal    Vitals reviewed        Vital Signs  ED Triage Vitals   Temperature Pulse Respirations Blood Pressure SpO2   08/30/18 2104 08/30/18 2100 08/30/18 2100 08/30/18 2100 08/30/18 2100   98 7 °F (37 1 °C) 95 18 143/84 97 %      Temp Source Heart Rate Source Patient Position - Orthostatic VS BP Location FiO2 (%)   08/30/18 2104 08/30/18 2100 08/30/18 2100 08/30/18 2100 --   Oral Monitor Sitting Right arm       Pain Score       08/30/18 2100 9           Vitals:    08/30/18 2100   BP: 143/84   Pulse: 95   Patient Position - Orthostatic VS: Sitting       Visual Acuity      ED Medications  Medications   sodium chloride 0 9 % bolus 1,000 mL (0 mL Intravenous Stopped 8/30/18 2226)   iohexol (OMNIPAQUE) 350 MG/ML injection (MULTI-DOSE) 100 mL (100 mL Intravenous Given 8/30/18 2209)   pantoprazole (PROTONIX) EC tablet 40 mg (40 mg Oral Given 8/30/18 2249)       Diagnostic Studies  Results Reviewed     Procedure Component Value Units Date/Time    TSH [[id number]]  (Abnormal) Collected:  08/30/18 2125    Lab Status:  Final result Specimen:  Blood from Arm, Right Updated:  08/30/18 2210     TSH 3RD GENERATON 4 378 (H) uIU/mL     Narrative:         Patients undergoing fluorescein dye angiography may retain small amounts of fluorescein in the body for 48-72 hours post procedure  Samples containing fluorescein can produce falsely depressed TSH values  If the patient had this procedure,a specimen should be resubmitted post fluorescein clearance  Lactic acid, plasma [[id number]]  (Normal) Collected:  08/30/18 2125    Lab Status:  Final result Specimen:  Blood from Arm, Right Updated:  08/30/18 2154     LACTIC ACID 1 3 mmol/L     Narrative:         Result may be elevated if tourniquet was used during collection  Comprehensive metabolic panel [[id number]] Collected:  08/30/18 2125    Lab Status:  Final result Specimen:  Blood from Arm, Right Updated:  08/30/18 2148     Sodium 140 mmol/L      Potassium 3 9 mmol/L      Chloride 104 mmol/L      CO2 29 mmol/L      ANION GAP 7 mmol/L      BUN 15 mg/dL      Creatinine 0 96 mg/dL      Glucose 117 mg/dL      Calcium 9 2 mg/dL      AST 31 U/L      ALT 74 U/L      Alkaline Phosphatase 74 U/L      Total Protein 6 9 g/dL      Albumin 3 6 g/dL      Total Bilirubin 0 20 mg/dL      eGFR 111 ml/min/1 73sq m     Narrative:         National Kidney Disease Education Program recommendations are as follows:  GFR calculation is accurate only with a steady state creatinine  Chronic Kidney disease less than 60 ml/min/1 73 sq  meters  Kidney failure less than 15 ml/min/1 73 sq  meters      Protime-INR [[id number]]  (Normal) Collected:  08/30/18 2125    Lab Status:  Final result Specimen:  Blood from Arm, Right Updated:  08/30/18 2144     Protime 13 0 seconds      INR 1 01    APTT [[id number]]  (Normal) Collected:  08/30/18 2125    Lab Status:  Final result Specimen:  Blood from Arm, Right Updated:  08/30/18 2144 PTT 29 seconds     CBC and differential [[id number]] Collected:  08/30/18 2125    Lab Status:  Final result Specimen:  Blood from Arm, Right Updated:  08/30/18 2134     WBC 7 64 Thousand/uL      RBC 5 05 Million/uL      Hemoglobin 14 8 g/dL      Hematocrit 46 3 %      MCV 92 fL      MCH 29 3 pg      MCHC 32 0 g/dL      RDW 12 6 %      MPV 9 6 fL      Platelets 498 Thousands/uL      nRBC 0 /100 WBCs      Neutrophils Relative 55 %      Immat GRANS % 0 %      Lymphocytes Relative 32 %      Monocytes Relative 8 %      Eosinophils Relative 5 %      Basophils Relative 0 %      Neutrophils Absolute 4 25 Thousands/µL      Immature Grans Absolute 0 02 Thousand/uL      Lymphocytes Absolute 2 41 Thousands/µL      Monocytes Absolute 0 59 Thousand/µL      Eosinophils Absolute 0 35 Thousand/µL      Basophils Absolute 0 02 Thousands/µL                  CT abdomen pelvis with contrast   Final Result by Rosendo Rainey MD (08/30 2231)      Findings consistent with gastritis       Diffusely low-attenuation throughout the liver, which may be seen with fatty infiltration            Workstation performed: [id number]                    Procedures  Procedures       Phone Contacts  ED Phone Contact    ED Course                               MDM  Number of Diagnoses or Management Options  Elevated TSH: new and requires workup  Fatty liver: new and requires workup  Gastritis: new and requires workup  Rectal bleeding: new and requires workup     Amount and/or Complexity of Data Reviewed  Clinical lab tests: ordered and reviewed  Tests in the radiology section of CPT®: ordered and reviewed  Decide to obtain previous medical records or to obtain history from someone other than the patient: yes  Obtain history from someone other than the patient: yes  Discuss the patient with other providers: yes  Independent visualization of images, tracings, or specimens: yes    Risk of Complications, Morbidity, and/or Mortality  General comments: 75-year-old male with chronic right-sided and epigastric abdominal pain, new onset of blood in stool  Patient's workup in the department demonstrates possible gastritis noted on CT  I have a very low suspicion that patient's rectal bleeding is related to his gastritis as he has been having bright red blood bleeding for several days  He is hemodynamically stable and nontoxic appearing  Will start oral proton pump inhibitor  Patient to follow up with Gastroenterology for further workup of rectal bleeding  Patient's significant other was concerned about his weight gain  TSH is slightly elevated, patient to follow up with PCP for further workup of thyroid on a nonemergent basis  Discussed fatty liver disease and diet modification  Discussed signs and symptoms to return to the emergency department  Patient Progress  Patient progress: stable    CritCare Time    Disposition  Final diagnoses:   Rectal bleeding   Gastritis   Fatty liver   Elevated TSH     Time reflects when diagnosis was documented in both MDM as applicable and the Disposition within this note     Time User Action Codes Description Comment    8/30/2018 10:41 PM Rosario Olivera [K62 5] Rectal bleeding     8/30/2018 10:41 PM Rosario Olivera [K29 70] Gastritis     8/30/2018 10:41 PM Rosario Olivera [K76 0] Fatty liver     8/30/2018 10:41 PM Rosario Olivera [R94 6] Elevated TSH       ED Disposition     ED Disposition Condition Comment    Discharge  Francois Croft discharge to home/self care      Condition at discharge: Stable        Follow-up Information     Follow up With Specialties Details Why [address] [facility] Schedule an appointment as soon as possible for a visit in 2 days For recheck of current symptoms [address] [facility] [facility] [id number]  [phone]    Amirah Murray MD Family Medicine Schedule an appointment as soon as possible for a visit in 2 days for further work up of abnormal thyroid test [address] [facility]  [address] [facility]            Discharge Medication List as of 8/30/2018 10:44 PM      START taking these medications    Details   ondansetron (ZOFRAN) 4 mg tablet Take 1 tablet (4 mg total) by mouth every 6 (six) hours, Starting Thu 8/30/2018, Normal      pantoprazole (PROTONIX) 40 mg tablet Take 1 tablet (40 mg total) by mouth daily, Starting Thu 8/30/2018, Print         CONTINUE these medications which have NOT CHANGED    Details   albuterol (VENTOLIN HFA) 90 mcg/act inhaler Inhale 2 puffs, Historical Med      fluticasone (FLOVENT HFA) 44 mcg/act inhaler Inhale 2 puffs every 12 (twelve) hours, Starting Wed 11/13/2013, Historical Med           No discharge procedures on file      ED Provider  Electronically Signed by           Brittany Shaw,   09/02/18 0454

## 2018-09-05 ENCOUNTER — TELEPHONE (OUTPATIENT)
Dept: FAMILY MEDICINE CLINIC | Facility: CLINIC | Age: 23
End: 2018-09-05

## 2018-09-05 DIAGNOSIS — K92.1 BLOODY STOOL: Primary | ICD-10-CM

## 2019-01-30 ENCOUNTER — OFFICE VISIT (OUTPATIENT)
Dept: URGENT CARE | Age: 24
End: 2019-01-30
Payer: COMMERCIAL

## 2019-01-30 VITALS
HEIGHT: 71 IN | RESPIRATION RATE: 20 BRPM | HEART RATE: 89 BPM | OXYGEN SATURATION: 96 % | WEIGHT: 315 LBS | DIASTOLIC BLOOD PRESSURE: 83 MMHG | SYSTOLIC BLOOD PRESSURE: 154 MMHG | TEMPERATURE: 98.5 F | BODY MASS INDEX: 44.1 KG/M2

## 2019-01-30 DIAGNOSIS — J02.9 SORE THROAT: ICD-10-CM

## 2019-01-30 DIAGNOSIS — J01.90 ACUTE SINUSITIS, RECURRENCE NOT SPECIFIED, UNSPECIFIED LOCATION: Primary | ICD-10-CM

## 2019-01-30 LAB — S PYO AG THROAT QL: NEGATIVE

## 2019-01-30 PROCEDURE — 99213 OFFICE O/P EST LOW 20 MIN: CPT | Performed by: FAMILY MEDICINE

## 2019-01-30 PROCEDURE — 87430 STREP A AG IA: CPT | Performed by: FAMILY MEDICINE

## 2019-01-30 PROCEDURE — G0382 LEV 3 HOSP TYPE B ED VISIT: HCPCS | Performed by: FAMILY MEDICINE

## 2019-01-30 PROCEDURE — 99283 EMERGENCY DEPT VISIT LOW MDM: CPT | Performed by: FAMILY MEDICINE

## 2019-01-30 RX ORDER — AMOXICILLIN AND CLAVULANATE POTASSIUM 875; 125 MG/1; MG/1
1 TABLET, FILM COATED ORAL EVERY 12 HOURS SCHEDULED
Qty: 20 TABLET | Refills: 0 | Status: SHIPPED | OUTPATIENT
Start: 2019-01-30 | End: 2019-02-09

## 2019-01-30 NOTE — PATIENT INSTRUCTIONS
Rest and drink extra fluids  Start antibiotic  Take probiotic  Neti pot or nasal saline flushes can be helpful to flashes sinuses  Cough medicine as prescribed  You can use Flonase and antihistamine which can help with nasal congestion and sinus pressure  Throat lozenge yours or Chloraseptic spray can be helpful with sore throat  Follow up with family doctor if no improvement  Go to the ER with worsening symptoms

## 2019-01-30 NOTE — LETTER
January 30, 2019     Patient: Dion Males   YOB: 1995   Date of Visit: 1/30/2019       To Whom It May Concern: It is my medical opinion that Barb Proctorkarinenicole may return to work on 01/31/2019  If you have any questions or concerns, please don't hesitate to call           Sincerely,        [facility]    CC: No Recipients

## 2019-01-30 NOTE — PROGRESS NOTES
[facility]        NAME: Rosalee Powell is a 21 y o  male  : 1995    MRN: [id number]  DATE: 2019  TIME: 10:55 AM    Assessment and Plan   Acute sinusitis, recurrence not specified, unspecified location [J01 90]  1  Acute sinusitis, recurrence not specified, unspecified location  amoxicillin-clavulanate (AUGMENTIN) 875-125 mg per tablet   2  Sore throat  POCT rapid strepA         Patient Instructions     Patient Instructions   Rest and drink extra fluids  Start antibiotic  Take probiotic  Neti pot or nasal saline flushes can be helpful to flashes sinuses  Cough medicine as prescribed  You can use Flonase and antihistamine which can help with nasal congestion and sinus pressure  Throat lozenge yours or Chloraseptic spray can be helpful with sore throat  Follow up with family doctor if no improvement  Go to the ER with worsening symptoms  Chief Complaint     Chief Complaint   Patient presents with    Sore Throat     mucus thick color yellow  for 5 days   Cough    Fever         History of Present Illness   Rosalee Powell presents to the clinic c/o    This is a 24-year-old male here today fever, sore throat, cough  He has been having thick yellow mucus  He does have some sinus pressure  He has been having postnasal drip  He has slight chest discomfort with coughing  He states he had a fever of 102 4 last   Not using any over-the-counter medications  Review of Systems   Review of Systems   Constitutional: Positive for activity change, chills, fatigue and fever  HENT: Positive for congestion, sinus pain and sinus pressure  Respiratory: Positive for cough  Negative for chest tightness and shortness of breath  Cardiovascular: Negative  Skin: Negative  Neurological: Negative  Psychiatric/Behavioral: Negative            Current Medications     Long-Term Prescriptions   Medication Sig Dispense Refill    fluticasone (FLOVENT HFA) 44 mcg/act inhaler Inhale 2 puffs every 12 (twelve) hours      pantoprazole (PROTONIX) 40 mg tablet Take 1 tablet (40 mg total) by mouth daily (Patient not taking: Reported on 1/30/2019 ) 20 tablet 0       Current Allergies     Allergies as of 01/30/2019    (No Known Allergies)            The following portions of the patient's history were reviewed and updated as appropriate: allergies, current medications, past family history, past medical history, past social history, past surgical history and problem list     Objective   /83   Pulse 89   Temp 98 5 °F (36 9 °C) (Temporal)   Resp 20   Ht 5' 11" (1 803 m)   Wt (!) 170 kg (375 lb)   SpO2 96%   BMI 52 30 kg/m²        Physical Exam     Physical Exam   Constitutional: He is oriented to person, place, and time  He appears well-developed and well-nourished  HENT:   Head: Normocephalic  Right Ear: External ear normal    Left Ear: External ear normal    Posterior pharynx mildly erythemic  Neck: Normal range of motion  Neck supple  Cardiovascular: Normal rate, regular rhythm and normal heart sounds  Pulmonary/Chest: Effort normal and breath sounds normal    Neurological: He is alert and oriented to person, place, and time  Psychiatric: He has a normal mood and affect  His behavior is normal    Nursing note and vitals reviewed

## 2019-05-28 ENCOUNTER — APPOINTMENT (OUTPATIENT)
Dept: URGENT CARE | Age: 24
End: 2019-05-28
Payer: OTHER MISCELLANEOUS

## 2019-05-28 ENCOUNTER — APPOINTMENT (OUTPATIENT)
Dept: RADIOLOGY | Age: 24
End: 2019-05-28
Payer: OTHER MISCELLANEOUS

## 2019-05-28 ENCOUNTER — TRANSCRIBE ORDERS (OUTPATIENT)
Dept: ADMINISTRATIVE | Age: 24
End: 2019-05-28

## 2019-05-28 DIAGNOSIS — T14.90XA INJURY: ICD-10-CM

## 2019-05-28 DIAGNOSIS — T14.90XA INJURY: Primary | ICD-10-CM

## 2019-05-28 PROCEDURE — G0382 LEV 3 HOSP TYPE B ED VISIT: HCPCS | Performed by: PREVENTIVE MEDICINE

## 2019-05-28 PROCEDURE — 73130 X-RAY EXAM OF HAND: CPT

## 2019-05-28 PROCEDURE — 99283 EMERGENCY DEPT VISIT LOW MDM: CPT | Performed by: PREVENTIVE MEDICINE

## 2019-05-31 ENCOUNTER — APPOINTMENT (OUTPATIENT)
Dept: URGENT CARE | Age: 24
End: 2019-05-31
Payer: OTHER MISCELLANEOUS

## 2019-05-31 PROCEDURE — 99213 OFFICE O/P EST LOW 20 MIN: CPT | Performed by: PREVENTIVE MEDICINE

## 2019-06-02 ENCOUNTER — OFFICE VISIT (OUTPATIENT)
Dept: URGENT CARE | Age: 24
End: 2019-06-02

## 2019-06-02 VITALS
HEIGHT: 71 IN | OXYGEN SATURATION: 98 % | DIASTOLIC BLOOD PRESSURE: 80 MMHG | RESPIRATION RATE: 20 BRPM | WEIGHT: 315 LBS | SYSTOLIC BLOOD PRESSURE: 130 MMHG | BODY MASS INDEX: 44.1 KG/M2 | TEMPERATURE: 97.8 F | HEART RATE: 98 BPM

## 2019-06-02 DIAGNOSIS — S76.311A RIGHT HAMSTRING STRAIN, INITIAL ENCOUNTER: Primary | ICD-10-CM

## 2019-06-02 PROCEDURE — G0382 LEV 3 HOSP TYPE B ED VISIT: HCPCS | Performed by: FAMILY MEDICINE

## 2019-06-02 PROCEDURE — 99283 EMERGENCY DEPT VISIT LOW MDM: CPT | Performed by: FAMILY MEDICINE

## 2019-06-02 RX ORDER — METHOCARBAMOL 500 MG/1
500 TABLET, FILM COATED ORAL 3 TIMES DAILY
Qty: 15 TABLET | Refills: 0 | Status: SHIPPED | OUTPATIENT
Start: 2019-06-02 | End: 2020-12-14

## 2019-06-02 RX ORDER — IBUPROFEN 800 MG/1
800 TABLET ORAL EVERY 8 HOURS SCHEDULED
Qty: 30 TABLET | Refills: 0 | Status: SHIPPED | OUTPATIENT
Start: 2019-06-02 | End: 2020-12-14

## 2019-06-02 RX ORDER — NAPROXEN 500 MG/1
500 TABLET ORAL EVERY 12 HOURS
Refills: 0 | COMMUNITY
Start: 2019-04-01 | End: 2019-06-02 | Stop reason: ALTCHOICE

## 2020-01-13 ENCOUNTER — HOSPITAL ENCOUNTER (EMERGENCY)
Facility: HOSPITAL | Age: 25
Discharge: HOME | End: 2020-01-13
Attending: EMERGENCY MEDICINE | Admitting: EMERGENCY MEDICINE

## 2020-01-13 ENCOUNTER — APPOINTMENT (EMERGENCY)
Dept: ULTRASOUND IMAGING | Facility: HOSPITAL | Age: 25
End: 2020-01-13

## 2020-01-13 VITALS
DIASTOLIC BLOOD PRESSURE: 73 MMHG | HEART RATE: 88 BPM | BODY MASS INDEX: 50.55 KG/M2 | OXYGEN SATURATION: 96 % | SYSTOLIC BLOOD PRESSURE: 148 MMHG | RESPIRATION RATE: 16 BRPM | WEIGHT: 315 LBS | TEMPERATURE: 98 F

## 2020-01-13 DIAGNOSIS — R10.11 RIGHT UPPER QUADRANT ABDOMINAL PAIN: Primary | ICD-10-CM

## 2020-01-13 LAB
ALBUMIN SERPL BCP-MCNC: 3.6 G/DL (ref 3.5–5)
ALP SERPL-CCNC: 73 U/L (ref 46–116)
ALT SERPL W P-5'-P-CCNC: 41 U/L (ref 12–78)
ANION GAP SERPL CALCULATED.3IONS-SCNC: 6 MMOL/L (ref 4–13)
AST SERPL W P-5'-P-CCNC: 18 U/L (ref 5–45)
BASOPHILS # BLD AUTO: 0.02 THOUSANDS/ΜL (ref 0–0.1)
BASOPHILS NFR BLD AUTO: 0 % (ref 0–1)
BILIRUB SERPL-MCNC: 0.2 MG/DL (ref 0.2–1)
BUN SERPL-MCNC: 13 MG/DL (ref 5–25)
CALCIUM SERPL-MCNC: 9.6 MG/DL (ref 8.3–10.1)
CHLORIDE SERPL-SCNC: 104 MMOL/L (ref 100–108)
CO2 SERPL-SCNC: 30 MMOL/L (ref 21–32)
CREAT SERPL-MCNC: 0.8 MG/DL (ref 0.6–1.3)
EOSINOPHIL # BLD AUTO: 0.28 THOUSAND/ΜL (ref 0–0.61)
EOSINOPHIL NFR BLD AUTO: 4 % (ref 0–6)
ERYTHROCYTE [DISTWIDTH] IN BLOOD BY AUTOMATED COUNT: 12.2 % (ref 11.6–15.1)
GFR SERPL CREATININE-BSD FRML MDRD: 125 ML/MIN/1.73SQ M
GLUCOSE SERPL-MCNC: 86 MG/DL (ref 65–140)
HCT VFR BLD AUTO: 48.3 % (ref 36.5–49.3)
HGB BLD-MCNC: 15.9 G/DL (ref 12–17)
IMM GRANULOCYTES # BLD AUTO: 0.02 THOUSAND/UL (ref 0–0.2)
IMM GRANULOCYTES NFR BLD AUTO: 0 % (ref 0–2)
LIPASE SERPL-CCNC: 99 U/L (ref 73–393)
LYMPHOCYTES # BLD AUTO: 2.09 THOUSANDS/ΜL (ref 0.6–4.47)
LYMPHOCYTES NFR BLD AUTO: 27 % (ref 14–44)
MCH RBC QN AUTO: 29.6 PG (ref 26.8–34.3)
MCHC RBC AUTO-ENTMCNC: 32.9 G/DL (ref 31.4–37.4)
MCV RBC AUTO: 90 FL (ref 82–98)
MONOCYTES # BLD AUTO: 0.65 THOUSAND/ΜL (ref 0.17–1.22)
MONOCYTES NFR BLD AUTO: 8 % (ref 4–12)
NEUTROPHILS # BLD AUTO: 4.81 THOUSANDS/ΜL (ref 1.85–7.62)
NEUTS SEG NFR BLD AUTO: 61 % (ref 43–75)
NRBC BLD AUTO-RTO: 0 /100 WBCS
PLATELET # BLD AUTO: 217 THOUSANDS/UL (ref 149–390)
PMV BLD AUTO: 9.6 FL (ref 8.9–12.7)
POTASSIUM SERPL-SCNC: 3.9 MMOL/L (ref 3.5–5.3)
PROT SERPL-MCNC: 6.9 G/DL (ref 6.4–8.2)
RBC # BLD AUTO: 5.38 MILLION/UL (ref 3.88–5.62)
SODIUM SERPL-SCNC: 140 MMOL/L (ref 136–145)
WBC # BLD AUTO: 7.87 THOUSAND/UL (ref 4.31–10.16)

## 2020-01-13 PROCEDURE — 36415 COLL VENOUS BLD VENIPUNCTURE: CPT | Performed by: EMERGENCY MEDICINE

## 2020-01-13 PROCEDURE — 80053 COMPREHEN METABOLIC PANEL: CPT | Performed by: EMERGENCY MEDICINE

## 2020-01-13 PROCEDURE — 85025 COMPLETE CBC W/AUTO DIFF WBC: CPT | Performed by: EMERGENCY MEDICINE

## 2020-01-13 PROCEDURE — 99284 EMERGENCY DEPT VISIT MOD MDM: CPT | Performed by: EMERGENCY MEDICINE

## 2020-01-13 PROCEDURE — 99284 EMERGENCY DEPT VISIT MOD MDM: CPT

## 2020-01-13 PROCEDURE — 83690 ASSAY OF LIPASE: CPT | Performed by: EMERGENCY MEDICINE

## 2020-01-13 PROCEDURE — 76705 ECHO EXAM OF ABDOMEN: CPT
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
## 2020-03-20 ENCOUNTER — APPOINTMENT (EMERGENCY)
Dept: RADIOLOGY | Facility: HOSPITAL | Age: 25
End: 2020-03-20

## 2020-03-20 ENCOUNTER — HOSPITAL ENCOUNTER (EMERGENCY)
Facility: HOSPITAL | Age: 25
Discharge: HOME | End: 2020-03-20
Attending: EMERGENCY MEDICINE | Admitting: EMERGENCY MEDICINE

## 2020-03-20 VITALS
OXYGEN SATURATION: 100 % | TEMPERATURE: 98.1 F | WEIGHT: 315 LBS | RESPIRATION RATE: 20 BRPM | SYSTOLIC BLOOD PRESSURE: 125 MMHG | BODY MASS INDEX: 51.66 KG/M2 | DIASTOLIC BLOOD PRESSURE: 67 MMHG | HEART RATE: 100 BPM

## 2020-03-20 DIAGNOSIS — R06.00 DYSPNEA: Primary | ICD-10-CM

## 2020-03-20 LAB
ANION GAP SERPL CALCULATED.3IONS-SCNC: 5 MMOL/L (ref 4–13)
BASOPHILS # BLD AUTO: 0.02 THOUSANDS/ΜL (ref 0–0.1)
BASOPHILS NFR BLD AUTO: 0 % (ref 0–1)
BUN SERPL-MCNC: 14 MG/DL (ref 5–25)
CALCIUM SERPL-MCNC: 9.5 MG/DL (ref 8.3–10.1)
CHLORIDE SERPL-SCNC: 108 MMOL/L (ref 100–108)
CO2 SERPL-SCNC: 29 MMOL/L (ref 21–32)
CREAT SERPL-MCNC: 0.71 MG/DL (ref 0.6–1.3)
D DIMER PPP FEU-MCNC: <0.27 UG/ML FEU
EOSINOPHIL # BLD AUTO: 0.14 THOUSAND/ΜL (ref 0–0.61)
EOSINOPHIL NFR BLD AUTO: 2 % (ref 0–6)
ERYTHROCYTE [DISTWIDTH] IN BLOOD BY AUTOMATED COUNT: 12.3 % (ref 11.6–15.1)
GFR SERPL CREATININE-BSD FRML MDRD: 130 ML/MIN/1.73SQ M
GLUCOSE SERPL-MCNC: 85 MG/DL (ref 65–140)
HCT VFR BLD AUTO: 47.7 % (ref 36.5–49.3)
HGB BLD-MCNC: 15.5 G/DL (ref 12–17)
IMM GRANULOCYTES # BLD AUTO: 0.04 THOUSAND/UL (ref 0–0.2)
IMM GRANULOCYTES NFR BLD AUTO: 1 % (ref 0–2)
LYMPHOCYTES # BLD AUTO: 2.41 THOUSANDS/ΜL (ref 0.6–4.47)
LYMPHOCYTES NFR BLD AUTO: 27 % (ref 14–44)
MCH RBC QN AUTO: 29.5 PG (ref 26.8–34.3)
MCHC RBC AUTO-ENTMCNC: 32.5 G/DL (ref 31.4–37.4)
MCV RBC AUTO: 91 FL (ref 82–98)
MONOCYTES # BLD AUTO: 0.74 THOUSAND/ΜL (ref 0.17–1.22)
MONOCYTES NFR BLD AUTO: 8 % (ref 4–12)
NEUTROPHILS # BLD AUTO: 5.51 THOUSANDS/ΜL (ref 1.85–7.62)
NEUTS SEG NFR BLD AUTO: 62 % (ref 43–75)
NRBC BLD AUTO-RTO: 0 /100 WBCS
PLATELET # BLD AUTO: 216 THOUSANDS/UL (ref 149–390)
PMV BLD AUTO: 9.8 FL (ref 8.9–12.7)
POTASSIUM SERPL-SCNC: 4.1 MMOL/L (ref 3.5–5.3)
RBC # BLD AUTO: 5.26 MILLION/UL (ref 3.88–5.62)
SODIUM SERPL-SCNC: 142 MMOL/L (ref 136–145)
WBC # BLD AUTO: 8.86 THOUSAND/UL (ref 4.31–10.16)

## 2020-03-20 PROCEDURE — 80048 BASIC METABOLIC PNL TOTAL CA: CPT | Performed by: EMERGENCY MEDICINE

## 2020-03-20 PROCEDURE — 94640 AIRWAY INHALATION TREATMENT: CPT | Performed by: EMERGENCY MEDICINE

## 2020-03-20 PROCEDURE — 85379 FIBRIN DEGRADATION QUANT: CPT | Performed by: EMERGENCY MEDICINE

## 2020-03-20 PROCEDURE — 71045 X-RAY EXAM CHEST 1 VIEW: CPT

## 2020-03-20 PROCEDURE — 36415 COLL VENOUS BLD VENIPUNCTURE: CPT | Performed by: EMERGENCY MEDICINE

## 2020-03-20 PROCEDURE — 85025 COMPLETE CBC W/AUTO DIFF WBC: CPT | Performed by: EMERGENCY MEDICINE

## 2020-03-20 PROCEDURE — 99285 EMERGENCY DEPT VISIT HI MDM: CPT

## 2020-03-20 PROCEDURE — 99284 EMERGENCY DEPT VISIT MOD MDM: CPT | Performed by: EMERGENCY MEDICINE

## 2020-03-20 PROCEDURE — 94640 AIRWAY INHALATION TREATMENT: CPT

## 2020-03-20 PROCEDURE — 93005 ELECTROCARDIOGRAM TRACING: CPT

## 2020-03-20 RX ORDER — ALBUTEROL SULFATE 2.5 MG/3ML
5 SOLUTION RESPIRATORY (INHALATION) ONCE
Status: COMPLETED | OUTPATIENT
Start: 2020-03-20 | End: 2020-03-20

## 2020-03-20 RX ORDER — ALBUTEROL SULFATE 90 UG/1
2 AEROSOL, METERED RESPIRATORY (INHALATION) ONCE
Status: COMPLETED | OUTPATIENT
Start: 2020-03-20 | End: 2020-03-20

## 2020-03-20 RX ADMIN — IPRATROPIUM BROMIDE 0.5 MG: 0.5 SOLUTION RESPIRATORY (INHALATION) at 20:43

## 2020-03-20 RX ADMIN — ALBUTEROL SULFATE 5 MG: 2.5 SOLUTION RESPIRATORY (INHALATION) at 20:43

## 2020-03-20 RX ADMIN — ALBUTEROL SULFATE 2 PUFF: 90 AEROSOL, METERED RESPIRATORY (INHALATION) at 21:49

## 2020-03-20 NOTE — ED NOTES
Family at bedside  Pt states chest pain gets worse with exertion  Pt denies trauma to chest recently, patient drank a [employer] energy today       Brooklyn Murry RN  03/20/20 5182

## 2020-03-21 LAB
ATRIAL RATE: 101 BPM
P AXIS: 66 DEGREES
PR INTERVAL: 172 MS
QRS AXIS: 50 DEGREES
QRSD INTERVAL: 84 MS
QT INTERVAL: 326 MS
QTC INTERVAL: 422 MS
T WAVE AXIS: 58 DEGREES
VENTRICULAR RATE: 101 BPM

## 2020-03-21 PROCEDURE — 93010 ELECTROCARDIOGRAM REPORT: CPT | Performed by: INTERNAL MEDICINE

## 2020-03-21 NOTE — DISCHARGE INSTRUCTIONS
Follow-up with primary care in 1 week for repeat evaluation  Return to the emergency department any worsening symptoms  Use albuterol inhaler as needed for your symptoms  Self quarantine at home while you feel sick  [address]    Your healthcare provider and/or public health staff have evaluated you and have determined that you do not need to be hospitalized at this time  At this time you can be isolated at home where you will be monitored by staff from your local or state health department  You should carefully follow the prevention and isolation steps below until a healthcare provider or local or state health department says that you can return to your normal activities  Stay home except to get medical care    People who are mildly ill with COVID-19 are able to isolate at home during their illness  You should restrict activities outside your home, except for getting medical care  Do not go to work, school, or public areas  Avoid using public transportation, ride-sharing, or taxis  Separate yourself from other people and animals in your home    People: As much as possible, you should stay in a specific room and away from other people in your home  Also, you should use a separate bathroom, if available  Animals: You should restrict contact with pets and other animals while you are sick with COVID-19, just like you would around other people  Although there have not been reports of pets or other animals becoming sick with COVID-19, it is still recommended that people sick with COVID-19 limit contact with animals until more information is known about the virus  When possible, have another member of your household care for your animals while you are sick  If you are sick with COVID-19, avoid contact with your pet, including petting, snuggling, being kissed or licked, and sharing food   If you must care for your pet or be around animals while you are sick, wash your hands before and after you interact with pets and wear a facemask  See COVID-19 and Animals for more information  Call ahead before visiting your doctor    If you have a medical appointment, call the healthcare provider and tell them that you have or may have COVID-19  This will help the healthcare providers office take steps to keep other people from getting infected or exposed  Wear a facemask    You should wear a facemask when you are around other people (e g , sharing a room or vehicle) or pets and before you enter a healthcare providers office  If you are not able to wear a facemask (for example, because it causes trouble breathing), then people who live with you should not stay in the same room with you, or they should wear a facemask if they enter your room  Cover your coughs and sneezes    Cover your mouth and nose with a tissue when you cough or sneeze  Throw used tissues in a lined trash can  Immediately wash your hands with soap and water for at least 20 seconds or, if soap and water are not available, clean your hands with an alcohol-based hand  that contains at least 60% alcohol  Clean your hands often    Wash your hands often with soap and water for at least 20 seconds, especially after blowing your nose, coughing, or sneezing; going to the bathroom; and before eating or preparing food  If soap and water are not readily available, use an alcohol-based hand  with at least 60% alcohol, covering all surfaces of your hands and rubbing them together until they feel dry  Soap and water are the best option if hands are visibly dirty  Avoid touching your eyes, nose, and mouth with unwashed hands  Avoid sharing personal household items    You should not share dishes, drinking glasses, cups, eating utensils, towels, or bedding with other people or pets in your home  After using these items, they should be washed thoroughly with soap and water      Clean all high-touch surfaces everyday    High touch surfaces include counters, tabletops, doorknobs, bathroom fixtures, toilets, phones, keyboards, tablets, and bedside tables  Also, clean any surfaces that may have blood, stool, or body fluids on them  Use a household cleaning spray or wipe, according to the label instructions  Labels contain instructions for safe and effective use of the cleaning product including precautions you should take when applying the product, such as wearing gloves and making sure you have good ventilation during use of the product  Monitor your symptoms    Seek prompt medical attention if your illness is worsening (e g , difficulty breathing)  Before seeking care, call your healthcare provider and tell them that you have, or are being evaluated for, COVID-19  Put on a facemask before you enter the facility  These steps will help the healthcare providers office to keep other people in the office or waiting room from getting infected or exposed  Ask your healthcare provider to call the local or state health department  Persons who are placed under active monitoring or facilitated self-monitoring should follow instructions provided by their local health department or occupational health professionals, as appropriate  If you have a medical emergency and need to call 911, notify the dispatch personnel that you have, or are being evaluated for COVID-19  If possible, put on a facemask before emergency medical services arrive  Discontinuing home isolation    Patients with confirmed COVID-19 should remain under home isolation precautions until the risk of secondary transmission to others is thought to be low  The decision to discontinue home isolation precautions should be made on a case-by-case basis, in consultation with healthcare providers and state and local health departments      Source: [employer]

## 2020-03-21 NOTE — ED PROVIDER NOTES
History  Chief Complaint   Patient presents with    Chest Pain     "just shortness of breath, my chest feels really tight" Worse on left side, started getting worse and worse today  55-year-old male history of asthma and pre diabetes presenting for evaluation of shortness of breath for the past few days  Patient states that it has been worsening over the past 2 or 3 days is exertional in nature as he is walking up a hill he denies any PE risk factors including immobilizations surgeries or steroids  He believes he has family history of blood clots but is unsure on who  He does not have any history of high cholesterol or cardiac history  Denies any associated nausea or vomiting no radiation of this shortness of breath stages it is central in his chest he denies any fevers chills or sick contacts but does work at [email or web address] where he has frequent sick customers coughing and sneezing all over the place  He also states he has had diarrhea for the past 2 days  History provided by:  Patient   used: No    Shortness of Breath   Severity:  Moderate  Onset quality:  Gradual  Timing:  Constant  Progression:  Unchanged  Chronicity:  New  Context: activity    Relieved by:  Nothing  Associated symptoms: no abdominal pain, no chest pain, no fever, no headaches, no rash, no sore throat and no vomiting        Prior to Admission Medications   Prescriptions Last Dose Informant Patient Reported? Taking?    albuterol (VENTOLIN HFA) 90 mcg/act inhaler More than a month at Unknown time  Yes No   Sig: Inhale 2 puffs   fluticasone (FLOVENT HFA) 44 mcg/act inhaler More than a month at Unknown time  Yes No   Sig: Inhale 2 puffs every 12 (twelve) hours   ibuprofen (MOTRIN) 800 mg tablet   No No   Sig: Take 1 tablet (800 mg total) by mouth every 8 (eight) hours for 10 days   methocarbamol (ROBAXIN) 500 mg tablet   No No   Sig: Take 1 tablet (500 mg total) by mouth 3 (three) times a day for 5 days   pantoprazole (PROTONIX) 40 mg tablet   No No   Sig: Take 1 tablet (40 mg total) by mouth daily   Patient not taking: Reported on 1/30/2019       Facility-Administered Medications: None       Past Medical History:   Diagnosis Date    Ankle fracture     right    Fall [id number]    "fell off of a roof"    Mild intermittent asthma        Past Surgical History:   Procedure Laterality Date    TOOTH EXTRACTION         Family History   Problem Relation Age of Onset    Diabetes Mother     Breast cancer Mother     Diabetes Father     Diabetes Sister     Diabetes Brother      I have reviewed and agree with the history as documented  E-Cigarette/Vaping     E-Cigarette/Vaping Substances     Social History     Tobacco Use    Smoking status: Former Smoker     Types: Cigarettes    Smokeless tobacco: Former User   Substance Use Topics    Alcohol use: Yes     Comment: socially    Drug use: No        Review of Systems   Constitutional: Negative for chills, fatigue and fever  HENT: Negative for sore throat  Eyes: Negative for visual disturbance  Respiratory: Positive for shortness of breath  Cardiovascular: Negative for chest pain, palpitations and leg swelling  Gastrointestinal: Positive for diarrhea  Negative for abdominal pain, constipation, nausea and vomiting  Genitourinary: Negative for difficulty urinating, dysuria and hematuria  Musculoskeletal: Negative for arthralgias  Skin: Negative for rash  Neurological: Negative for syncope, weakness and headaches  Hematological: Negative for adenopathy  Psychiatric/Behavioral: Negative for agitation and behavioral problems  All other systems reviewed and are negative        Physical Exam  ED Triage Vitals   Temperature Pulse Respirations Blood Pressure SpO2   03/20/20 1925 03/20/20 1926 03/20/20 1926 03/20/20 1926 03/20/20 1926   98 1 °F (36 7 °C) 101 20 158/92 96 %      Temp Source Heart Rate Source Patient Position - Orthostatic VS BP Location FiO2 (%) 03/20/20 1925 03/20/20 1926 03/20/20 2045 03/20/20 2045 --   Oral Monitor Lying Right arm       Pain Score       03/20/20 1926       4             Orthostatic Vital Signs  Vitals:    03/20/20 1926 03/20/20 1930 03/20/20 2045   BP: 158/92 155/70 125/67   Pulse: 101 100 100   Patient Position - Orthostatic VS:   Lying       Physical Exam   Constitutional: He is oriented to person, place, and time  He appears well-developed and well-nourished  HENT:   Head: Normocephalic and atraumatic  Eyes: Conjunctivae and EOM are normal  No scleral icterus  Neck: Normal range of motion  Neck supple  Cardiovascular: Normal rate and regular rhythm  No murmur heard  Pulmonary/Chest: Effort normal and breath sounds normal  He has no wheezes  Abdominal: Soft  Bowel sounds are normal  There is no tenderness  Musculoskeletal: Normal range of motion  Neurological: He is alert and oriented to person, place, and time  Skin: Skin is warm and dry  Psychiatric: He has a normal mood and affect  His behavior is normal    Nursing note and vitals reviewed        ED Medications  Medications   albuterol inhalation solution 5 mg (5 mg Nebulization Given 3/20/20 2043)   ipratropium (ATROVENT) 0 02 % inhalation solution 0 5 mg (0 5 mg Nebulization Given 3/20/20 2043)   albuterol (PROVENTIL HFA,VENTOLIN HFA) inhaler 2 puff (2 puffs Inhalation Given 3/20/20 2149)       Diagnostic Studies  Results Reviewed     Procedure Component Value Units Date/Time    D-Dimer [[id number]]  (Normal) Collected:  03/20/20 2006    Lab Status:  Final result Specimen:  Blood from Arm, Right Updated:  03/20/20 2058     D-Dimer, Quant <0 27 ug/ml FEU     Basic metabolic panel [[id number]] Collected:  03/20/20 2006    Lab Status:  Final result Specimen:  Blood from Arm, Right Updated:  03/20/20 2051     Sodium 142 mmol/L      Potassium 4 1 mmol/L      Chloride 108 mmol/L      CO2 29 mmol/L      ANION GAP 5 mmol/L      BUN 14 mg/dL      Creatinine 0 71 mg/dL Glucose 85 mg/dL      Calcium 9 5 mg/dL      eGFR 130 ml/min/1 73sq m     Narrative:       [place] guidelines for Chronic Kidney Disease (CKD):     Stage 1 with normal or high GFR (GFR > 90 mL/min/1 73 square meters)    Stage 2 Mild CKD (GFR = 60-89 mL/min/1 73 square meters)    Stage 3A Moderate CKD (GFR = 45-59 mL/min/1 73 square meters)    Stage 3B Moderate CKD (GFR = 30-44 mL/min/1 73 square meters)    Stage 4 Severe CKD (GFR = 15-29 mL/min/1 73 square meters)    Stage 5 End Stage CKD (GFR <15 mL/min/1 73 square meters)  Note: GFR calculation is accurate only with a steady state creatinine    CBC and differential [[id number]] Collected:  03/20/20 2006    Lab Status:  Final result Specimen:  Blood from Arm, Right Updated:  03/20/20 2023     WBC 8 86 Thousand/uL      RBC 5 26 Million/uL      Hemoglobin 15 5 g/dL      Hematocrit 47 7 %      MCV 91 fL      MCH 29 5 pg      MCHC 32 5 g/dL      RDW 12 3 %      MPV 9 8 fL      Platelets 480 Thousands/uL      nRBC 0 /100 WBCs      Neutrophils Relative 62 %      Immat GRANS % 1 %      Lymphocytes Relative 27 %      Monocytes Relative 8 %      Eosinophils Relative 2 %      Basophils Relative 0 %      Neutrophils Absolute 5 51 Thousands/µL      Immature Grans Absolute 0 04 Thousand/uL      Lymphocytes Absolute 2 41 Thousands/µL      Monocytes Absolute 0 74 Thousand/µL      Eosinophils Absolute 0 14 Thousand/µL      Basophils Absolute 0 02 Thousands/µL                  XR chest portable   Final Result by Phani Bean MD (03/20 2132)      No acute cardiopulmonary disease              Workstation performed: [id number]               Procedures  Procedures      ED Course  ED Course as of Mar 20 2152   Fri Mar 20, 2020   2135 D-Dimer, Quant: <0 27             MDM  Number of Diagnoses or Management Options  Dyspnea: new and requires workup  Diagnosis management comments: 51-year-old male presenting for evaluation of shortness of breath for the past few days, patient on exam did not have any wheezing had oxygen saturation 94 in the room  Will obtain basic labs D-dimer chest x-ray and provide a DuoNeb treatment  Patient's symptoms completely resolved after DuoNeb treatment he felt much improved  Discussed with him following up with primary care regarding his concern for diabetes as well as filling of future asthma medications  Patient agreeable this gave close return precautions the emergency department he is agreeable  Amount and/or Complexity of Data Reviewed  Clinical lab tests: ordered and reviewed  Tests in the radiology section of CPT®: ordered and reviewed  Tests in the medicine section of CPT®: ordered and reviewed  Review and summarize past medical records: yes  Discuss the patient with other providers: yes  Independent visualization of images, tracings, or specimens: yes          Disposition  Final diagnoses:   Dyspnea     Time reflects when diagnosis was documented in both MDM as applicable and the Disposition within this note     Time User Action Codes Description Comment    3/20/2020  9:37 PM Dusty Arredondo [R06 00] Dyspnea       ED Disposition     ED Disposition Condition Date/Time Comment    Discharge Stable Fri Mar 20, 2020  9:37 PM Angélica Granda discharge to home/self care              Follow-up Information     Follow up With Specialties Details Why Contact Info Additional Information    Luis Charles MD Family Medicine Schedule an appointment as soon as possible for a visit in 1 week  [address]  [address]  [address] [place] [address] Emergency Department Emergency Medicine Go to  If symptoms worsen [facility] [address] [place] [address], [address] [place] [address], [address] [phone]          Discharge Medication List as of 3/20/2020  9:38 PM      CONTINUE these medications which have NOT CHANGED    Details   albuterol (VENTOLIN HFA) 90 mcg/act inhaler Inhale 2 puffs, Historical Med      fluticasone (FLOVENT HFA) 44 mcg/act inhaler Inhale 2 puffs every 12 (twelve) hours, Starting Wed 11/13/2013, Historical Med      ibuprofen (MOTRIN) 800 mg tablet Take 1 tablet (800 mg total) by mouth every 8 (eight) hours for 10 days, Starting Sun 6/2/2019, Until Wed 6/12/2019, Normal      methocarbamol (ROBAXIN) 500 mg tablet Take 1 tablet (500 mg total) by mouth 3 (three) times a day for 5 days, Starting Sun 6/2/2019, Until Fri 6/7/2019, Normal      pantoprazole (PROTONIX) 40 mg tablet Take 1 tablet (40 mg total) by mouth daily, Starting Thu 8/30/2018, Print           No discharge procedures on file  PDMP Review     None           ED Provider  Attending physically available and evaluated Candace Croft I managed the patient along with the ED Attending      Electronically Signed by         Patrick Becerra MD  03/20/20 9616

## 2020-03-21 NOTE — ED ATTENDING ATTESTATION
3/20/2020  Mikhail Mao DO, saw and evaluated the patient  I have discussed the patient with the resident/non-physician practitioner and agree with the resident's/non-physician practitioner's findings, Plan of Care, and MDM as documented in the resident's/non-physician practitioner's note, except where noted  All available labs and Radiology studies were reviewed  I was present for key portions of any procedure(s) performed by the resident/non-physician practitioner and I was immediately available to provide assistance  At this point I agree with the current assessment done in the Emergency Department  I have conducted an independent evaluation of this patient a history and physical is as follows:    21 yo male presents for evaluation of chest tightness and dyspnea for past couple days  Worse with exertion/activity  Generalized in nature, constant  Moderate severity  No other c/o at this time  Lungs CTAB    Imp: chest tightness, dyspnea  Plan: cardiac eval including D-dimer  Reassess        ED Course         Critical Care Time  Procedures

## 2020-03-21 NOTE — ED NOTES
Pt refused IV access at this time   Pt stated, "I'd rather be stuck twice"     Kevin Beach, JL  03/20/20 2006

## 2020-04-27 PROBLEM — R73.01 IFG (IMPAIRED FASTING GLUCOSE): Status: ACTIVE | Noted: 2018-01-03

## 2020-04-28 ENCOUNTER — TELEMEDICINE (OUTPATIENT)
Dept: FAMILY MEDICINE CLINIC | Facility: CLINIC | Age: 25
End: 2020-04-28

## 2020-04-28 ENCOUNTER — HOSPITAL ENCOUNTER (EMERGENCY)
Facility: HOSPITAL | Age: 25
Discharge: HOME | End: 2020-04-28
Attending: EMERGENCY MEDICINE

## 2020-04-28 VITALS — WEIGHT: 315 LBS | TEMPERATURE: 98.7 F | BODY MASS INDEX: 49.93 KG/M2

## 2020-04-28 VITALS
HEART RATE: 104 BPM | SYSTOLIC BLOOD PRESSURE: 148 MMHG | WEIGHT: 315 LBS | TEMPERATURE: 98.5 F | RESPIRATION RATE: 18 BRPM | OXYGEN SATURATION: 95 % | DIASTOLIC BLOOD PRESSURE: 91 MMHG | BODY MASS INDEX: 50.58 KG/M2

## 2020-04-28 DIAGNOSIS — K59.00 CONSTIPATION, UNSPECIFIED CONSTIPATION TYPE: Primary | ICD-10-CM

## 2020-04-28 DIAGNOSIS — R06.83 SNORING: ICD-10-CM

## 2020-04-28 DIAGNOSIS — R51.9 NONINTRACTABLE EPISODIC HEADACHE, UNSPECIFIED HEADACHE TYPE: ICD-10-CM

## 2020-04-28 DIAGNOSIS — R41.3 POOR SHORT TERM MEMORY: ICD-10-CM

## 2020-04-28 DIAGNOSIS — E66.01 CLASS 3 SEVERE OBESITY DUE TO EXCESS CALORIES WITHOUT SERIOUS COMORBIDITY WITH BODY MASS INDEX (BMI) OF 45.0 TO 49.9 IN ADULT (HCC): ICD-10-CM

## 2020-04-28 DIAGNOSIS — R40.0 DAYTIME SOMNOLENCE: ICD-10-CM

## 2020-04-28 DIAGNOSIS — R73.01 IFG (IMPAIRED FASTING GLUCOSE): ICD-10-CM

## 2020-04-28 DIAGNOSIS — R06.81 APNEA: ICD-10-CM

## 2020-04-28 DIAGNOSIS — R04.0 ACUTE ANTERIOR EPISTAXIS: Primary | ICD-10-CM

## 2020-04-28 PROBLEM — S76.311A RIGHT HAMSTRING STRAIN, INITIAL ENCOUNTER: Status: RESOLVED | Noted: 2019-06-02 | Resolved: 2020-04-28

## 2020-04-28 PROBLEM — S50.11XA TRAUMATIC HEMATOMA OF RIGHT FOREARM: Status: RESOLVED | Noted: 2018-07-19 | Resolved: 2020-04-28

## 2020-04-28 PROBLEM — E66.813 CLASS 3 SEVERE OBESITY DUE TO EXCESS CALORIES WITHOUT SERIOUS COMORBIDITY WITH BODY MASS INDEX (BMI) OF 45.0 TO 49.9 IN ADULT (HCC): Status: ACTIVE | Noted: 2020-04-28

## 2020-04-28 PROBLEM — R20.2 PARESTHESIA OF RIGHT UPPER EXTREMITY: Status: RESOLVED | Noted: 2018-07-19 | Resolved: 2020-04-28

## 2020-04-28 PROCEDURE — 30901 CONTROL OF NOSEBLEED: CPT | Performed by: EMERGENCY MEDICINE

## 2020-04-28 PROCEDURE — G2012 BRIEF CHECK IN BY MD/QHP: HCPCS | Performed by: NURSE PRACTITIONER

## 2020-04-28 PROCEDURE — 99283 EMERGENCY DEPT VISIT LOW MDM: CPT

## 2020-04-28 PROCEDURE — 99283 EMERGENCY DEPT VISIT LOW MDM: CPT | Performed by: EMERGENCY MEDICINE

## 2020-04-28 RX ORDER — OXYMETAZOLINE HYDROCHLORIDE 0.05 G/100ML
2 SPRAY NASAL ONCE
Status: COMPLETED | OUTPATIENT
Start: 2020-04-28 | End: 2020-04-28

## 2020-04-28 RX ADMIN — SILVER NITRATE APPLICATORS 1 APPLICATOR: 25; 75 STICK TOPICAL at 13:47

## 2020-04-28 RX ADMIN — OXYMETAZOLINE HYDROCHLORIDE 2 SPRAY: 0.05 SPRAY NASAL at 13:47

## 2020-06-27 ENCOUNTER — OFFICE VISIT (OUTPATIENT)
Dept: URGENT CARE | Age: 25
End: 2020-06-27

## 2020-06-27 VITALS
HEART RATE: 85 BPM | SYSTOLIC BLOOD PRESSURE: 140 MMHG | TEMPERATURE: 98.3 F | WEIGHT: 315 LBS | BODY MASS INDEX: 45.1 KG/M2 | OXYGEN SATURATION: 96 % | DIASTOLIC BLOOD PRESSURE: 70 MMHG | RESPIRATION RATE: 18 BRPM | HEIGHT: 70 IN

## 2020-06-27 DIAGNOSIS — K52.9 GASTROENTERITIS: Primary | ICD-10-CM

## 2020-06-27 PROCEDURE — 99213 OFFICE O/P EST LOW 20 MIN: CPT | Performed by: PREVENTIVE MEDICINE

## 2020-06-27 RX ORDER — ONDANSETRON 4 MG/1
4 TABLET, ORALLY DISINTEGRATING ORAL EVERY 6 HOURS PRN
Qty: 20 TABLET | Refills: 0 | Status: SHIPPED | OUTPATIENT
Start: 2020-06-27 | End: 2020-09-29

## 2020-07-05 ENCOUNTER — OFFICE VISIT (OUTPATIENT)
Dept: URGENT CARE | Age: 25
End: 2020-07-05
Payer: COMMERCIAL

## 2020-07-05 VITALS
DIASTOLIC BLOOD PRESSURE: 68 MMHG | OXYGEN SATURATION: 96 % | TEMPERATURE: 97.5 F | HEIGHT: 70 IN | WEIGHT: 315 LBS | SYSTOLIC BLOOD PRESSURE: 135 MMHG | HEART RATE: 109 BPM | BODY MASS INDEX: 45.1 KG/M2 | RESPIRATION RATE: 16 BRPM

## 2020-07-05 DIAGNOSIS — R51.9 ACUTE NONINTRACTABLE HEADACHE, UNSPECIFIED HEADACHE TYPE: Primary | ICD-10-CM

## 2020-07-05 PROCEDURE — 99213 OFFICE O/P EST LOW 20 MIN: CPT | Performed by: PREVENTIVE MEDICINE

## 2020-07-05 PROCEDURE — 96372 THER/PROPH/DIAG INJ SC/IM: CPT | Performed by: PREVENTIVE MEDICINE

## 2020-07-05 RX ORDER — KETOROLAC TROMETHAMINE 30 MG/ML
30 INJECTION, SOLUTION INTRAMUSCULAR; INTRAVENOUS ONCE
Status: COMPLETED | OUTPATIENT
Start: 2020-07-05 | End: 2020-07-05

## 2020-07-05 RX ADMIN — KETOROLAC TROMETHAMINE 30 MG: 30 INJECTION, SOLUTION INTRAMUSCULAR; INTRAVENOUS at 14:53

## 2020-07-05 NOTE — LETTER
July 5, 2020     Patient: Bev Garcia   YOB: 1995   Date of Visit: 7/5/2020       To Whom It May Concern: It is my medical opinion that Noemí Floyd may return to work on 7/6/2020  Please excuse him from work 7/5/2020  If you have any questions or concerns, please don't hesitate to call           Sincerely,        Luke Valera MD    CC: No Recipients

## 2020-07-05 NOTE — PATIENT INSTRUCTIONS
Migraine Headache   AMBULATORY CARE:   A migraine headache  is a severe headache  The pain can be so severe that it interferes with your daily activities  A migraine can last a few hours up to several days  The exact cause of migraines is not known  Common triggers for a migraine include the following:   · Stress, eye strain, oversleeping, or not getting enough sleep    · Hormone changes in women from birth control pills, pregnancy, menopause, or during a monthly period    · Skipping meals, going too long without eating, or not drinking enough liquids    · Certain foods or drinks such as chocolate, hard cheese, red wine, or drinks that contain caffeine    · Foods that contain gluten, nitrates, MSG, or artificial sweeteners    · Sunlight, bright or flashing lights, loud noises, smoke, or strong smells    · Heat, humidity, or changes in the weather  Common warning signs include the following:  Warning signs usually start 15 to 60 minutes before the headache:  · Visual changes (auras), such as blurred vision, temporary blind or bright spots, lines, or hallucinations    · Unusual tiredness or frequent yawning    · Tingling in an arm or leg  Seek care immediately if:   · You have a headache that seems different or much worse than your usual migraine headache  · You have a severe headache with a fever or a stiff neck  · You have new problems with speech, vision, balance, or movement  · You feel like you are going to faint, you become confused, or you have a seizure  Contact your healthcare provider or neurologist if:   · You have a fever  · Your migraines interfere with your daily activities  · Your medicines or treatments stop working  · You have questions about your condition or care  Treatment:  Migraines cannot be cured  The goal of treatment is to reduce your symptoms  Take medicine as soon as you feel a migraine begin  · Prescription pain medicine  may be given   Do not wait until the pain is severe before you take your medicine  · Migraine medicines  are used to help prevent a migraine or stop it once it starts  · Antinausea medicine  may be given to calm your stomach and to help prevent vomiting  This medicine can also help relieve pain  Manage your symptoms:   · Rest in a dark, quiet room  This will help decrease your pain  Sleep may also help relieve the pain  · Apply ice to decrease pain  Use an ice pack, or put crushed ice in a plastic bag  Cover the ice pack with a towel and place it on your head where it hurts for 15 to 20 minutes every hour  · Apply heat to decrease pain and muscle spasms  Use a small towel dampened with warm water or a heating pad, or sit in a warm bath  Apply heat on the area for 20 to 30 minutes every 2 hours  You may alternate heat and ice  · Keep a migraine record  Write down when your migraines start and stop  Include your symptoms and what you were doing when a migraine began  Record what you ate or drank for 24 hours before the migraine started  Keep track of what you did to treat your migraine and if it worked  Follow up with your healthcare provider as directed:  Bring your migraine record with you  Write down your questions so you remember to ask them during your visits  Prevent another migraine headache:   · Do not smoke  Nicotine and other chemicals in cigarettes and cigars can trigger a migraine and also cause lung damage  Ask your healthcare provider for information if you currently smoke and need help to quit  E-cigarettes or smokeless tobacco still contain nicotine  Talk to your healthcare provider before you use these products  · Do not drink alcohol  Alcohol can trigger a migraine  It can also interfere with the medicines used to treat your migraine  · Exercise regularly  Ask about the best exercise plan for you  · Manage stress  Stress may trigger a migraine  Learn new ways to relax, such as deep breathing      · Follow a sleep schedule  Go to bed and get up at the same time each day  · Eat a variety of healthy foods  Healthy foods include fruit, vegetables, whole-grain breads, low-fat dairy products, beans, lean meats, and fish  Do not have foods or drinks that trigger your migraines  © 2017 [address] [employer] Information is for End User's use only and may not be sold, redistributed or otherwise used for commercial purposes  All illustrations and images included in CareNotes® are the copyrighted property of [employer]  or [employer]  The above information is an  only  It is not intended as medical advice for individual conditions or treatments  Talk to your doctor, nurse or pharmacist before following any medical regimen to see if it is safe and effective for you

## 2020-07-06 NOTE — PROGRESS NOTES
[facility] [facility]        NAME: Darron Davies is a 22 y o  male  : 1995    MRN: [id number]  DATE: 2020  TIME: 6:55 PM    Assessment and Plan   Acute nonintractable headache, unspecified headache type [R51]  1  Acute nonintractable headache, unspecified headache type  ketorolac (TORADOL) injection 30 mg         Patient Instructions       Follow up with PCP in 3-5 days  Proceed to  ER if symptoms worsen  Chief Complaint     Chief Complaint   Patient presents with    Headache     Pt complaining of a headache with light and sound sensitivity x1 days  History of Present Illness       Headache    This is a new problem  The current episode started yesterday  The problem occurs constantly  The problem has been unchanged  The pain is located in the bilateral region  The pain quality is not similar to prior headaches  The quality of the pain is described as pulsating and shooting  The pain is at a severity of 7/10  The pain is moderate  Associated symptoms include phonophobia and photophobia  Pertinent negatives include no dizziness, fever or nausea  Nothing aggravates the symptoms  He has tried acetaminophen for the symptoms  The treatment provided no relief  Review of Systems   Review of Systems   Constitutional: Negative for fever  Eyes: Positive for photophobia  Respiratory: Negative  Cardiovascular: Negative  Gastrointestinal: Negative for nausea  Neurological: Positive for headaches  Negative for dizziness  All other systems reviewed and are negative          Current Medications       Current Outpatient Medications:     albuterol (VENTOLIN HFA) 90 mcg/act inhaler, Inhale 2 puffs, Disp: , Rfl:     fluticasone (FLOVENT HFA) 44 mcg/act inhaler, Inhale 2 puffs every 12 (twelve) hours, Disp: , Rfl:     ibuprofen (MOTRIN) 800 mg tablet, Take 1 tablet (800 mg total) by mouth every 8 (eight) hours for 10 days, Disp: 30 tablet, Rfl: 0    methocarbamol (ROBAXIN) 500 mg tablet, Take 1 tablet (500 mg total) by mouth 3 (three) times a day for 5 days, Disp: 15 tablet, Rfl: 0    ondansetron (ZOFRAN-ODT) 4 mg disintegrating tablet, Take 1 tablet (4 mg total) by mouth every 6 (six) hours as needed for nausea or vomiting (Patient not taking: Reported on 7/5/2020), Disp: 20 tablet, Rfl: 0    Current Allergies     Allergies as of 07/05/2020    (No Known Allergies)            The following portions of the patient's history were reviewed and updated as appropriate: allergies, current medications, past family history, past medical history, past social history, past surgical history and problem list      Past Medical History:   Diagnosis Date    Ankle fracture     right    Fall [id number]    "fell off of a roof"    Mild intermittent asthma        Past Surgical History:   Procedure Laterality Date    TOOTH EXTRACTION         Family History   Problem Relation Age of Onset    Diabetes Mother     Breast cancer Mother     Diabetes Father     Hypertension Father     Diabetes Sister     Diabetes Brother     Hypertension Brother     Hypertension Maternal Grandfather     Diabetes Maternal Grandfather     Thyroid disease Maternal Grandfather     Diabetes Paternal Grandmother     Alzheimer's disease Paternal Grandfather     Thyroid disease Maternal Uncle     Alzheimer's disease Paternal Aunt          Medications have been verified  Objective   /68 (BP Location: Left arm, Patient Position: Sitting)   Pulse (!) 109   Temp 97 5 °F (36 4 °C) (Temporal)   Resp 16   Ht 5' 10" (1 778 m)   Wt (!) 171 kg (378 lb)   SpO2 96%   BMI 54 24 kg/m²        Physical Exam     Physical Exam   Constitutional: He is oriented to person, place, and time  He appears well-developed and well-nourished  HENT:   Head: Normocephalic and atraumatic  Right Ear: External ear normal    Left Ear: External ear normal    Cardiovascular: Normal rate and regular rhythm     Pulmonary/Chest: Effort normal and breath sounds normal    Abdominal: Soft  Bowel sounds are normal    Neurological: He is oriented to person, place, and time  He displays normal reflexes  No cranial nerve deficit  Nursing note and vitals reviewed

## 2020-07-16 ENCOUNTER — OFFICE VISIT (OUTPATIENT)
Dept: FAMILY MEDICINE CLINIC | Facility: CLINIC | Age: 25
End: 2020-07-16
Payer: COMMERCIAL

## 2020-07-16 VITALS
BODY MASS INDEX: 45.1 KG/M2 | OXYGEN SATURATION: 99 % | DIASTOLIC BLOOD PRESSURE: 78 MMHG | SYSTOLIC BLOOD PRESSURE: 128 MMHG | TEMPERATURE: 98.7 F | WEIGHT: 315 LBS | HEIGHT: 70 IN | HEART RATE: 95 BPM

## 2020-07-16 DIAGNOSIS — K12.0 APHTHOUS ULCER: Primary | ICD-10-CM

## 2020-07-16 DIAGNOSIS — R06.83 SNORING: ICD-10-CM

## 2020-07-16 DIAGNOSIS — R40.0 DAYTIME SOMNOLENCE: ICD-10-CM

## 2020-07-16 PROCEDURE — 3008F BODY MASS INDEX DOCD: CPT | Performed by: NURSE PRACTITIONER

## 2020-07-16 PROCEDURE — 99213 OFFICE O/P EST LOW 20 MIN: CPT | Performed by: NURSE PRACTITIONER

## 2020-07-16 PROCEDURE — 1036F TOBACCO NON-USER: CPT | Performed by: NURSE PRACTITIONER

## 2020-07-16 NOTE — PROGRESS NOTES
Assessment/Plan:           Problem List Items Addressed This Visit        Other    Aphthous ulcer - Primary    Relevant Medications    al mag oxide-diphenhydramine-lidocaine viscous (MAGIC MOUTHWASH) 1:1:1 suspension    Daytime somnolence    Relevant Orders    Ambulatory referral to Sleep Medicine    Snoring    Relevant Orders    Ambulatory referral to Sleep Medicine            Subjective:      Patient ID: Dion Martin is a 22 y o  male  Here for c/o sore on right side of tongue for the past 3 days  Hx of this in past  Also c/o snoring, apnea, daytime fatigue  Interested in getting sleep study done  Aware of need to lose weight and exercise due to obesity        The following portions of the patient's history were reviewed and updated as appropriate: allergies, current medications, past family history, past medical history, past social history, past surgical history and problem list     Review of Systems   Constitutional: Positive for fatigue  Negative for chills and fever  HENT: Positive for mouth sores  Negative for congestion and postnasal drip  Respiratory: Negative for cough and shortness of breath  Cardiovascular: Negative for chest pain and palpitations  Gastrointestinal: Negative for constipation and diarrhea  Genitourinary: Negative for dysuria and frequency  Musculoskeletal: Negative for arthralgias and myalgias  Skin: Negative for rash  Neurological: Negative for dizziness and headaches  Hematological: Negative for adenopathy  Psychiatric/Behavioral: Positive for sleep disturbance  Negative for dysphoric mood and suicidal ideas  The patient is not nervous/anxious            Objective:      /78   Pulse 95   Temp 98 7 °F (37 1 °C) (Tympanic)   Ht 5' 10" (1 778 m)   Wt (!) 174 kg (382 lb 12 8 oz)   SpO2 99%   BMI 54 93 kg/m²     Family History   Problem Relation Age of Onset    Diabetes Mother     Breast cancer Mother     Diabetes Father     Hypertension Father    [facility] Diabetes Sister     Diabetes Brother     Hypertension Brother     Hypertension Maternal Grandfather     Diabetes Maternal Grandfather     Thyroid disease Maternal Grandfather     Diabetes Paternal Grandmother     Alzheimer's disease Paternal Grandfather     Thyroid disease Maternal Uncle     Alzheimer's disease Paternal Aunt      Past Medical History:   Diagnosis Date    Ankle fracture     right    Fall [id number]    "fell off of a roof"    Mild intermittent asthma      Social History     Socioeconomic History    Marital status: Single     Spouse name: Not on file    Number of children: Not on file    Years of education: Not on file    Highest education level: Not on file   Occupational History    Not on file   Social Needs    Financial resource strain: Not on file    Food insecurity:     Worry: Not on file     Inability: Not on file    Transportation needs:     Medical: Not on file     Non-medical: Not on file   Tobacco Use    Smoking status: Former Smoker     Types: Cigarettes    Smokeless tobacco: Former User   Substance and Sexual Activity    Alcohol use: Yes     Comment: socially    Drug use: No    Sexual activity: Not on file   Lifestyle    Physical activity:     Days per week: Not on file     Minutes per session: Not on file    Stress: Not on file   Relationships    Social connections:     Talks on phone: Not on file     Gets together: Not on file     Attends Jainism service: Not on file     Active member of club or organization: Not on file     Attends meetings of clubs or organizations: Not on file     Relationship status: Not on file    Intimate partner violence:     Fear of current or ex partner: Not on file     Emotionally abused: Not on file     Physically abused: Not on file     Forced sexual activity: Not on file   Other Topics Concern    Not on file   Social History Narrative    Daily coffee consumption:1 cup a day        Most recent tobacco use screenin2018 Current Outpatient Medications:     al mag oxide-diphenhydramine-lidocaine viscous (MAGIC MOUTHWASH) 1:1:1 suspension, Swish and spit 10 mL every 4 (four) hours as needed for mouth pain or discomfort, Disp: 90 mL, Rfl: 1    albuterol (VENTOLIN HFA) 90 mcg/act inhaler, Inhale 2 puffs, Disp: , Rfl:     fluticasone (FLOVENT HFA) 44 mcg/act inhaler, Inhale 2 puffs every 12 (twelve) hours, Disp: , Rfl:     ibuprofen (MOTRIN) 800 mg tablet, Take 1 tablet (800 mg total) by mouth every 8 (eight) hours for 10 days, Disp: 30 tablet, Rfl: 0    methocarbamol (ROBAXIN) 500 mg tablet, Take 1 tablet (500 mg total) by mouth 3 (three) times a day for 5 days, Disp: 15 tablet, Rfl: 0    ondansetron (ZOFRAN-ODT) 4 mg disintegrating tablet, Take 1 tablet (4 mg total) by mouth every 6 (six) hours as needed for nausea or vomiting (Patient not taking: Reported on 7/5/2020), Disp: 20 tablet, Rfl: 0  No Known Allergies     Physical Exam   Constitutional: He is oriented to person, place, and time  He appears well-developed and well-nourished  HENT:   Mouth/Throat: Uvula is midline and mucous membranes are normal        apthous ulcer right side of tongue approx 3mm round    Narrowed a/p diameter  hyperglossal       Eyes: Conjunctivae are normal    Cardiovascular: Normal rate, regular rhythm and normal heart sounds  Pulmonary/Chest: Effort normal and breath sounds normal    Musculoskeletal: Normal range of motion  Neurological: He is alert and oriented to person, place, and time  Skin: Skin is warm and dry  Psychiatric: He has a normal mood and affect  His behavior is normal  Judgment and thought content normal    Nursing note and vitals reviewed

## 2020-07-20 ENCOUNTER — APPOINTMENT (EMERGENCY)
Dept: ULTRASOUND IMAGING | Facility: HOSPITAL | Age: 25
End: 2020-07-20
Payer: COMMERCIAL

## 2020-07-20 ENCOUNTER — APPOINTMENT (EMERGENCY)
Dept: CT IMAGING | Facility: HOSPITAL | Age: 25
End: 2020-07-20
Payer: COMMERCIAL

## 2020-07-20 ENCOUNTER — HOSPITAL ENCOUNTER (EMERGENCY)
Facility: HOSPITAL | Age: 25
Discharge: HOME | End: 2020-07-20
Attending: EMERGENCY MEDICINE | Admitting: EMERGENCY MEDICINE
Payer: COMMERCIAL

## 2020-07-20 VITALS
WEIGHT: 315 LBS | BODY MASS INDEX: 45.1 KG/M2 | DIASTOLIC BLOOD PRESSURE: 64 MMHG | HEIGHT: 70 IN | HEART RATE: 97 BPM | RESPIRATION RATE: 18 BRPM | SYSTOLIC BLOOD PRESSURE: 123 MMHG | TEMPERATURE: 98.5 F | OXYGEN SATURATION: 97 %

## 2020-07-20 DIAGNOSIS — K76.0 FATTY LIVER: ICD-10-CM

## 2020-07-20 DIAGNOSIS — R10.9 ABDOMINAL PAIN: Primary | ICD-10-CM

## 2020-07-20 DIAGNOSIS — K92.1 BLOODY STOOLS: ICD-10-CM

## 2020-07-20 LAB
ALBUMIN SERPL BCP-MCNC: 3.6 G/DL (ref 3.5–5)
ALP SERPL-CCNC: 77 U/L (ref 46–116)
ALT SERPL W P-5'-P-CCNC: 56 U/L (ref 12–78)
ANION GAP SERPL CALCULATED.3IONS-SCNC: 6 MMOL/L (ref 4–13)
AST SERPL W P-5'-P-CCNC: 22 U/L (ref 5–45)
BASOPHILS # BLD AUTO: 0.03 THOUSANDS/ΜL (ref 0–0.1)
BASOPHILS NFR BLD AUTO: 0 % (ref 0–1)
BILIRUB SERPL-MCNC: 0.19 MG/DL (ref 0.2–1)
BUN SERPL-MCNC: 12 MG/DL (ref 5–25)
CALCIUM SERPL-MCNC: 9.2 MG/DL (ref 8.3–10.1)
CHLORIDE SERPL-SCNC: 101 MMOL/L (ref 100–108)
CO2 SERPL-SCNC: 29 MMOL/L (ref 21–32)
CREAT SERPL-MCNC: 0.82 MG/DL (ref 0.6–1.3)
EOSINOPHIL # BLD AUTO: 0.23 THOUSAND/ΜL (ref 0–0.61)
EOSINOPHIL NFR BLD AUTO: 3 % (ref 0–6)
ERYTHROCYTE [DISTWIDTH] IN BLOOD BY AUTOMATED COUNT: 12.8 % (ref 11.6–15.1)
GFR SERPL CREATININE-BSD FRML MDRD: 123 ML/MIN/1.73SQ M
GLUCOSE SERPL-MCNC: 108 MG/DL (ref 65–140)
HCT VFR BLD AUTO: 46.2 % (ref 36.5–49.3)
HGB BLD-MCNC: 15 G/DL (ref 12–17)
IMM GRANULOCYTES # BLD AUTO: 0.04 THOUSAND/UL (ref 0–0.2)
IMM GRANULOCYTES NFR BLD AUTO: 1 % (ref 0–2)
LIPASE SERPL-CCNC: 92 U/L (ref 73–393)
LYMPHOCYTES # BLD AUTO: 2.75 THOUSANDS/ΜL (ref 0.6–4.47)
LYMPHOCYTES NFR BLD AUTO: 34 % (ref 14–44)
MCH RBC QN AUTO: 30 PG (ref 26.8–34.3)
MCHC RBC AUTO-ENTMCNC: 32.5 G/DL (ref 31.4–37.4)
MCV RBC AUTO: 92 FL (ref 82–98)
MONOCYTES # BLD AUTO: 0.65 THOUSAND/ΜL (ref 0.17–1.22)
MONOCYTES NFR BLD AUTO: 8 % (ref 4–12)
NEUTROPHILS # BLD AUTO: 4.41 THOUSANDS/ΜL (ref 1.85–7.62)
NEUTS SEG NFR BLD AUTO: 54 % (ref 43–75)
NRBC BLD AUTO-RTO: 0 /100 WBCS
PLATELET # BLD AUTO: 219 THOUSANDS/UL (ref 149–390)
PMV BLD AUTO: 9.6 FL (ref 8.9–12.7)
POTASSIUM SERPL-SCNC: 3.4 MMOL/L (ref 3.5–5.3)
PROT SERPL-MCNC: 7.1 G/DL (ref 6.4–8.2)
RBC # BLD AUTO: 5 MILLION/UL (ref 3.88–5.62)
SARS-COV-2 RNA RESP QL NAA+PROBE: NEGATIVE
SODIUM SERPL-SCNC: 136 MMOL/L (ref 136–145)
WBC # BLD AUTO: 8.11 THOUSAND/UL (ref 4.31–10.16)

## 2020-07-20 PROCEDURE — 87635 SARS-COV-2 COVID-19 AMP PRB: CPT | Performed by: PHYSICIAN ASSISTANT

## 2020-07-20 PROCEDURE — 36415 COLL VENOUS BLD VENIPUNCTURE: CPT | Performed by: PHYSICIAN ASSISTANT

## 2020-07-20 PROCEDURE — 74177 CT ABD & PELVIS W/CONTRAST: CPT

## 2020-07-20 PROCEDURE — 80053 COMPREHEN METABOLIC PANEL: CPT | Performed by: PHYSICIAN ASSISTANT

## 2020-07-20 PROCEDURE — 76705 ECHO EXAM OF ABDOMEN: CPT

## 2020-07-20 PROCEDURE — C9113 INJ PANTOPRAZOLE SODIUM, VIA: HCPCS | Performed by: PHYSICIAN ASSISTANT

## 2020-07-20 PROCEDURE — 83690 ASSAY OF LIPASE: CPT | Performed by: PHYSICIAN ASSISTANT

## 2020-07-20 PROCEDURE — 99284 EMERGENCY DEPT VISIT MOD MDM: CPT

## 2020-07-20 PROCEDURE — 96374 THER/PROPH/DIAG INJ IV PUSH: CPT

## 2020-07-20 PROCEDURE — 85025 COMPLETE CBC W/AUTO DIFF WBC: CPT | Performed by: PHYSICIAN ASSISTANT

## 2020-07-20 PROCEDURE — 99284 EMERGENCY DEPT VISIT MOD MDM: CPT | Performed by: PHYSICIAN ASSISTANT

## 2020-07-20 PROCEDURE — 96361 HYDRATE IV INFUSION ADD-ON: CPT

## 2020-07-20 RX ORDER — MAGNESIUM HYDROXIDE/ALUMINUM HYDROXICE/SIMETHICONE 120; 1200; 1200 MG/30ML; MG/30ML; MG/30ML
30 SUSPENSION ORAL ONCE
Status: COMPLETED | OUTPATIENT
Start: 2020-07-20 | End: 2020-07-20

## 2020-07-20 RX ORDER — LIDOCAINE HYDROCHLORIDE 20 MG/ML
15 SOLUTION OROPHARYNGEAL ONCE
Status: COMPLETED | OUTPATIENT
Start: 2020-07-20 | End: 2020-07-20

## 2020-07-20 RX ORDER — SUCRALFATE 1 G/1
1 TABLET ORAL ONCE
Status: COMPLETED | OUTPATIENT
Start: 2020-07-20 | End: 2020-07-20

## 2020-07-20 RX ORDER — PANTOPRAZOLE SODIUM 40 MG/1
40 INJECTION, POWDER, FOR SOLUTION INTRAVENOUS ONCE
Status: COMPLETED | OUTPATIENT
Start: 2020-07-20 | End: 2020-07-20

## 2020-07-20 RX ADMIN — WATER 5 ML: 1 INJECTION INTRAMUSCULAR; INTRAVENOUS; SUBCUTANEOUS at 21:26

## 2020-07-20 RX ADMIN — PANTOPRAZOLE SODIUM 40 MG: 40 INJECTION, POWDER, FOR SOLUTION INTRAVENOUS at 21:26

## 2020-07-20 RX ADMIN — ALUMINUM HYDROXIDE, MAGNESIUM HYDROXIDE, AND SIMETHICONE 30 ML: 200; 200; 20 SUSPENSION ORAL at 21:23

## 2020-07-20 RX ADMIN — IOHEXOL 100 ML: 350 INJECTION, SOLUTION INTRAVENOUS at 22:30

## 2020-07-20 RX ADMIN — SODIUM CHLORIDE 1000 ML: 0.9 INJECTION, SOLUTION INTRAVENOUS at 20:43

## 2020-07-20 RX ADMIN — LIDOCAINE HYDROCHLORIDE 15 ML: 20 SOLUTION ORAL; TOPICAL at 21:23

## 2020-07-20 RX ADMIN — SUCRALFATE 1 G: 1 TABLET ORAL at 23:16

## 2020-07-21 NOTE — ED NOTES
Pt in negative distress at this time  Ambulated off unit with steady gait  No other questions upon discharge       Natalee Clements RN  07/20/20 0632

## 2020-07-23 NOTE — ED PROVIDER NOTES
EMERGENCY MEDICINE NOTE        PATIENT IDENTIFICATION PHYSICIAN/SERVICE INFORMATION   Name: Augustin Cornejo  MRN: [id number]  YOB: 1995  Age/Sex: 22 y o  male  Preferred Language: English  Code Status: No Order  Encounter Date: 7/20/2020  Attending Physician: Dory Gray MD  Admitting Physician: No admitting provider for patient encounter  Primary Care Physician: PRIMO Shrestha         Primary Care Phone: [phone] [address] [facility] [place]     Chief Complaint   Patient presents with    Abdominal Pain     patient reports having right upper quadrant abdominal pain  worse with eating   denies n/v         HISTORY OF PRESENT ILLNESS       History provided by:  Patient and significant other   used: No    Abdominal Pain   Pain location:  Generalized (worse RUQ)  Pain quality: aching and cramping    Pain radiates to:  Does not radiate  Pain severity:  Moderate  Onset quality:  Gradual  Timing:  Intermittent  Progression:  Waxing and waning  Chronicity:  Recurrent  Context: not alcohol use, not awakening from sleep, not diet changes, not eating, not laxative use, not medication withdrawal, not previous surgeries, not recent illness, not recent travel, not retching, not sick contacts, not suspicious food intake and not trauma    Relieved by:  None tried  Worsened by:  Nothing  Ineffective treatments:  None tried  Associated symptoms: hematochezia (only with stools--maroon, no rectal pain, straining, or masses)    Associated symptoms: no anorexia, no belching, no chest pain, no chills, no constipation, no cough, no diarrhea, no dysuria, no fatigue, no fever, no hematemesis, no hematuria, no melena, no nausea, no shortness of breath, no sore throat and no vomiting    Risk factors: obesity    Risk factors: no alcohol abuse, no aspirin use, not elderly, has not had multiple surgeries, no NSAID use and no recent hospitalization          PAST MEDICAL AND SURGICAL HISTORY     Past Medical History:   Diagnosis Date    Ankle fracture     right    Fall 4839-9623    "fell off of a roof"    Mild intermittent asthma        Past Surgical History:   Procedure Laterality Date    TOOTH EXTRACTION         Family History   Problem Relation Age of Onset    Diabetes Mother     Breast cancer Mother     Diabetes Father     Hypertension Father     Diabetes Sister     Diabetes Brother     Hypertension Brother     Hypertension Maternal Grandfather     Diabetes Maternal Grandfather     Thyroid disease Maternal Grandfather     Diabetes Paternal Grandmother     Alzheimer's disease Paternal Grandfather     Thyroid disease Maternal Uncle     Alzheimer's disease Paternal Aunt        E-Cigarette/Vaping     E-Cigarette/Vaping Substances     Social History     Tobacco Use    Smoking status: Former Smoker     Types: Cigarettes    Smokeless tobacco: Former User   Substance Use Topics    Alcohol use: Yes     Comment: socially    Drug use: No         ALLERGIES     No Known Allergies      HOME MEDICATIONS     Prior to Admission Medications   Prescriptions Last Dose Informant Patient Reported? Taking?    al mag oxide-diphenhydramine-lidocaine viscous (MAGIC MOUTHWASH) 1:1:1 suspension   No No   Sig: Swish and spit 10 mL every 4 (four) hours as needed for mouth pain or discomfort   albuterol (VENTOLIN HFA) 90 mcg/act inhaler   Yes No   Sig: Inhale 2 puffs   fluticasone (FLOVENT HFA) 44 mcg/act inhaler   Yes No   Sig: Inhale 2 puffs every 12 (twelve) hours   ibuprofen (MOTRIN) 800 mg tablet   No No   Sig: Take 1 tablet (800 mg total) by mouth every 8 (eight) hours for 10 days   methocarbamol (ROBAXIN) 500 mg tablet   No No   Sig: Take 1 tablet (500 mg total) by mouth 3 (three) times a day for 5 days   ondansetron (ZOFRAN-ODT) 4 mg disintegrating tablet   No No   Sig: Take 1 tablet (4 mg total) by mouth every 6 (six) hours as needed for nausea or vomiting   Patient not taking: Reported on 7/5/2020 Facility-Administered Medications: None         REVIEW OF SYSTEMS     Review of Systems   Constitutional: Negative for activity change, appetite change, chills, diaphoresis, fatigue and fever  HENT: Negative for congestion, drooling, ear discharge, ear pain, facial swelling, postnasal drip, rhinorrhea, sinus pressure, sinus pain, sore throat, trouble swallowing and voice change  Eyes: Negative for discharge and visual disturbance  Respiratory: Negative for apnea, cough, choking, chest tightness, shortness of breath, wheezing and stridor  Cardiovascular: Negative for chest pain, palpitations and leg swelling  Gastrointestinal: Positive for abdominal pain and hematochezia (only with stools--maroon, no rectal pain, straining, or masses)  Negative for abdominal distention, anorexia, constipation, diarrhea, hematemesis, melena, nausea and vomiting  Genitourinary: Negative for decreased urine volume, difficulty urinating, dysuria, flank pain, frequency and hematuria  Musculoskeletal: Negative for arthralgias, joint swelling and neck pain  Skin: Negative for rash  Neurological: Negative for dizziness, weakness, light-headedness, numbness and headaches  Psychiatric/Behavioral: The patient is not nervous/anxious  All other systems reviewed and are negative          PHYSICAL EXAMINATION     ED Triage Vitals   Temperature Pulse Respirations Blood Pressure SpO2   07/20/20 1932 07/20/20 1930 07/20/20 1930 07/20/20 1930 07/20/20 1930   98 5 °F (36 9 °C) 102 18 160/94 97 %      Temp Source Heart Rate Source Patient Position - Orthostatic VS BP Location FiO2 (%)   07/20/20 1932 07/20/20 1930 07/20/20 1930 07/20/20 1930 --   Oral Monitor Sitting Right arm       Pain Score       07/20/20 1930       6         Wt Readings from Last 3 Encounters:   07/20/20 (!) 174 kg (384 lb)   07/16/20 (!) 174 kg (382 lb 12 8 oz)   07/05/20 (!) 171 kg (378 lb)         Physical Exam   Constitutional: He is oriented to person, place, and time  He appears well-developed and well-nourished  Non-toxic appearance  He does not appear ill  No distress  HENT:   Head: Normocephalic and atraumatic  Mouth/Throat: Oropharynx is clear and moist    Eyes: Pupils are equal, round, and reactive to light  Conjunctivae and EOM are normal    Neck: Normal range of motion  Neck supple  Cardiovascular: Normal rate, regular rhythm, normal heart sounds and intact distal pulses  Exam reveals no gallop and no friction rub  No murmur heard  Pulmonary/Chest: Effort normal and breath sounds normal  No respiratory distress  He has no wheezes  He has no rales  He exhibits no tenderness  Abdominal: Soft  Bowel sounds are normal  He exhibits no distension and no mass  There is no hepatosplenomegaly  There is tenderness (mild) in the right upper quadrant and epigastric area  There is no rigidity, no rebound, no guarding, no CVA tenderness, no tenderness at McBurney's point and negative Shaikh's sign  No hernia  Negative Shaikh's  Negative Appendiceal signs (Psoas, Rovsing's, Obturator)  Negative Peritoneal Signs   Genitourinary: Rectum normal  Rectal exam shows no mass, no tenderness and guaiac negative stool  Genitourinary Comments: Deferred chaperone  Musculoskeletal: Normal range of motion  Neurological: He is alert and oriented to person, place, and time  Skin: Skin is warm and dry  Capillary refill takes less than 2 seconds  He is not diaphoretic  Nursing note and vitals reviewed          DIAGNOSTIC RESULTS     Laboratory results:    Labs Reviewed   COMPREHENSIVE METABOLIC PANEL - Abnormal       Result Value Ref Range Status    Sodium 136  136 - 145 mmol/L Final    Potassium 3 4 (*) 3 5 - 5 3 mmol/L Final    Chloride 101  100 - 108 mmol/L Final    CO2 29  21 - 32 mmol/L Final    ANION GAP 6  4 - 13 mmol/L Final    BUN 12  5 - 25 mg/dL Final    Creatinine 0 82  0 60 - 1 30 mg/dL Final    Comment: Standardized to IDMS reference method    Glucose 108  65 - 140 mg/dL Final    Comment:   If the patient is fasting, the ADA then defines impaired fasting glucose as > 100 mg/dL and diabetes as > or equal to 123 mg/dL  Specimen collection should occur prior to Sulfasalazine administration due to the potential for falsely depressed results  Specimen collection should occur prior to Sulfapyridine administration due to the potential for falsely elevated results  Calcium 9 2  8 3 - 10 1 mg/dL Final    AST 22  5 - 45 U/L Final    Comment:   Specimen collection should occur prior to Sulfasalazine administration due to the potential for falsely depressed results  ALT 56  12 - 78 U/L Final    Comment:   Specimen collection should occur prior to Sulfasalazine administration due to the potential for falsely depressed results  Alkaline Phosphatase 77  46 - 116 U/L Final    Total Protein 7 1  6 4 - 8 2 g/dL Final    Albumin 3 6  3 5 - 5 0 g/dL Final    Total Bilirubin 0 19 (*) 0 20 - 1 00 mg/dL Final    Comment:   Use of this assay is not recommended for patients undergoing treatment with eltrombopag due to the potential for falsely elevated results  eGFR 123  ml/min/1 73sq m Final    Narrative:     [place] guidelines for Chronic Kidney Disease (CKD):     Stage 1 with normal or high GFR (GFR > 90 mL/min/1 73 square meters)    Stage 2 Mild CKD (GFR = 60-89 mL/min/1 73 square meters)    Stage 3A Moderate CKD (GFR = 45-59 mL/min/1 73 square meters)    Stage 3B Moderate CKD (GFR = 30-44 mL/min/1 73 square meters)    Stage 4 Severe CKD (GFR = 15-29 mL/min/1 73 square meters)    Stage 5 End Stage CKD (GFR <15 mL/min/1 73 square meters)  Note: GFR calculation is accurate only with a steady state creatinine   NOVEL CORONAVIRUS (COVID-19), PCR [facility] - Normal    SARS-CoV-2 Negative  Negative Final    Narrative:      The specimen collection materials, transport medium, and/or testing methodology utilized in the production of these test results have been proven to be reliable in a limited validation with an abbreviated program under the Emergency Utilization Authorization provided by the FDA  Testing reported as "Presumptive positive" will be confirmed with secondary testing with a reference laboratory to ensure result accuracy  Clinical caution and judgement should be used with the interpretation of these results with consideration of the clinical impression and other laboratory testing  Testing reported as "Positive" or "Negative" has been proven to be accurate according to standard laboratory validation requirements  All testing is performed with control materials showing appropriate reactivity at standard intervals       LIPASE - Normal    Lipase 92  73 - 393 u/L Final   CBC AND DIFFERENTIAL    WBC 8 11  4 31 - 10 16 Thousand/uL Final    RBC 5 00  3 88 - 5 62 Million/uL Final    Hemoglobin 15 0  12 0 - 17 0 g/dL Final    Hematocrit 46 2  36 5 - 49 3 % Final    MCV 92  82 - 98 fL Final    MCH 30 0  26 8 - 34 3 pg Final    MCHC 32 5  31 4 - 37 4 g/dL Final    RDW 12 8  11 6 - 15 1 % Final    MPV 9 6  8 9 - 12 7 fL Final    Platelets 398  079 - 390 Thousands/uL Final    nRBC 0  /100 WBCs Final    Neutrophils Relative 54  43 - 75 % Final    Immat GRANS % 1  0 - 2 % Final    Lymphocytes Relative 34  14 - 44 % Final    Monocytes Relative 8  4 - 12 % Final    Eosinophils Relative 3  0 - 6 % Final    Basophils Relative 0  0 - 1 % Final    Neutrophils Absolute 4 41  1 85 - 7 62 Thousands/µL Final    Immature Grans Absolute 0 04  0 00 - 0 20 Thousand/uL Final    Lymphocytes Absolute 2 75  0 60 - 4 47 Thousands/µL Final    Monocytes Absolute 0 65  0 17 - 1 22 Thousand/µL Final    Eosinophils Absolute 0 23  0 00 - 0 61 Thousand/µL Final    Basophils Absolute 0 03  0 00 - 0 10 Thousands/µL Final       All labs reviewed and utilized in the medical decision making process    Radiology results:    CT abdomen pelvis with contrast   Final Result      No acute intra-abdominal abnormality  No free air or free fluid  Diffuse fatty infiltration of the liver  Workstation performed: [id number]         US gallbladder   Final Result      The liver is moderately enlarged and measures 20 cm in the midclavicular line         Workstation performed: [id number]             All radiology studies independently viewed by me and interpreted by the radiologist       PROCEDURES     Procedures        ASSESSMENT AND PLAN     MDM  Number of Diagnoses or Management Options  Abdominal pain: new, needed workup  Bloody stools: new, needed workup  Fatty liver: new, no workup     Amount and/or Complexity of Data Reviewed  Clinical lab tests: ordered and reviewed  Tests in the radiology section of CPT®: ordered and reviewed  Tests in the medicine section of CPT®: reviewed and ordered  Obtain history from someone other than the patient: yes  Review and summarize past medical records: yes  Independent visualization of images, tracings, or specimens: yes    Risk of Complications, Morbidity, and/or Mortality  Presenting problems: moderate  Diagnostic procedures: moderate  Management options: moderate    Patient Progress  Patient progress: stable      Initial ED assessment:  Yared Glaser is a 22 y o  male with no significant PMH who presents with Abdominal Pain  Vitals signs reviewed and WNL  Physical examination is remarkable for mild epigastric, ruq ttp    Initial Ddx  includes but is not limited to:  appendicitis, gastroenteritis, gastritis, PUD, GERD, gastroparesis, hepatitis, pancreatitis, colitis, enteritis, food poisoning, mesenteric adenitis, IBD, IBS, ileus, bowel obstruction, volvulus, cholecystitis, biliary colic, choledocholithiasis, perforated viscus, splenic etiology, constipation, diverticulitis, internal hernia, testicular torsion, renal colic, pyelonephritis, UTI      Initial ED plan:   Plan will be to perform diagnostic testing of Blood labs, Urinalysis and CT of abdomen and treat symptomatically   Final ED summary/disposition: Discussed results of diagnostic testing with Patient and Family with Permission in detail  Greater than 10 minutes of education regarding diagnosis, testing, and ample opportunity for questions  Home care recommendations given with discharge paperwork  Return to ED instructions given if new/worsening sxs  Verbalized understanding  MDM  Reviewed: previous chart, nursing note and vitals  Interpretation: labs and CT scan          ED COURSE OF CARE AND REASSESSMENT                                          Medications   sodium chloride 0 9 % bolus 1,000 mL (0 mL Intravenous Stopped 7/20/20 2215)   Lidocaine Viscous HCl (XYLOCAINE) 2 % mucosal solution 15 mL (15 mL Swish & Swallow Given 7/20/20 2123)   aluminum-magnesium hydroxide-simethicone (MYLANTA) 200-200-20 mg/5 mL oral suspension 30 mL (30 mL Oral Given 7/20/20 2123)   pantoprazole (PROTONIX) injection 40 mg (40 mg Intravenous Given 7/20/20 2126)   sterile water injection **ADS Override Pull** (5 mL  Given 7/20/20 2126)   iohexol (OMNIPAQUE) 350 MG/ML injection (SINGLE-DOSE) 100 mL (100 mL Intravenous Given 7/20/20 2230)   sucralfate (CARAFATE) tablet 1 g (1 g Oral Given 7/20/20 2316)         FINAL IMPRESSION     Final diagnoses:   Abdominal pain   Fatty liver   Bloody stools         DISPOSITION AND PLANNING     Time reflects when diagnosis was documented in both MDM as applicable and the Disposition within this note     Time User Action Codes Description Comment    7/20/2020 11:02 PM Claudia Zafar Add [R10 9] Abdominal pain     7/20/2020 11:02 PM Claudia Zafar Add [K76 0] Fatty liver     7/20/2020 11:03 PM Claudia Zafar Add [K92 1] Bloody stools       ED Disposition     ED Disposition Condition Date/Time Comment    Discharge Stable Mon Jul 20, 2020 11:02 PM Иван Signs discharge to home/self care              Follow-up Information     Follow up With Specialties Details Why Contact Info Additional [address] [facility] [place] Call  For follow up [address]  [facility] [id number] [facility] [place], [address] [facility] [address] [facility], [place], [place], [address]    Bethany Dillon, [address] [facility], Internal Medicine, Nurse Practitioner Call in 2 days For follow up [address] [place] [address] [id number]       [address] [facility] Department Emergency Medicine Go to  If symptoms worsen [address] [facility] [address]  [phone] [facility] ED, [address], [place], [place], [address] [facility] [place]  32 [facility] 44   [phone]  Call   For follow up    Bethany Dillon, [facility] [address]  [address] [address]  [address]   [phone]    Call in 2 days  For follow up    [address] [facility] [address]  [phone]  Go to   If symptoms worsen        DISCHARGE MEDICATIONS     Discharge Medication List as of 7/20/2020 11:04 PM      CONTINUE these medications which have NOT CHANGED    Details   al mag oxide-diphenhydramine-lidocaine viscous (MAGIC MOUTHWASH) 1:1:1 suspension Swish and spit 10 mL every 4 (four) hours as needed for mouth pain or discomfort, Starting Thu 7/16/2020, Normal      albuterol (VENTOLIN HFA) 90 mcg/act inhaler Inhale 2 puffs, Historical Med      fluticasone (FLOVENT HFA) 44 mcg/act inhaler Inhale 2 puffs every 12 (twelve) hours, Starting Wed 11/13/2013, Historical Med      ibuprofen (MOTRIN) 800 mg tablet Take 1 tablet (800 mg total) by mouth every 8 (eight) hours for 10 days, Starting Sun 6/2/2019, Until Wed 6/12/2019, Normal      methocarbamol (ROBAXIN) 500 mg tablet Take 1 tablet (500 mg total) by mouth 3 (three) times a day for 5 days, Starting Sun 6/2/2019, Until Fri 6/7/2019, Normal      ondansetron (ZOFRAN-ODT) 4 mg disintegrating tablet Take 1 tablet (4 mg total) by mouth every 6 (six) hours as needed for nausea or vomiting, Starting Sat 6/27/2020, Normal             No discharge procedures on file      PDMP Review     None          [place], [place]  07/23/20 7775

## 2020-09-26 ENCOUNTER — HOSPITAL ENCOUNTER (OUTPATIENT)
Facility: HOSPITAL | Age: 25
Setting detail: OBSERVATION
Discharge: HOME | End: 2020-09-27
Attending: EMERGENCY MEDICINE | Admitting: INTERNAL MEDICINE

## 2020-09-26 ENCOUNTER — APPOINTMENT (EMERGENCY)
Dept: RADIOLOGY | Facility: HOSPITAL | Age: 25
End: 2020-09-26

## 2020-09-26 DIAGNOSIS — R07.9 CHEST PAIN: Primary | ICD-10-CM

## 2020-09-26 LAB
ANION GAP SERPL CALCULATED.3IONS-SCNC: 11 MMOL/L (ref 4–13)
BASOPHILS # BLD AUTO: 0.02 THOUSANDS/ΜL (ref 0–0.1)
BASOPHILS NFR BLD AUTO: 0 % (ref 0–1)
BUN SERPL-MCNC: 11 MG/DL (ref 5–25)
CALCIUM SERPL-MCNC: 9.2 MG/DL (ref 8.3–10.1)
CHLORIDE SERPL-SCNC: 102 MMOL/L (ref 100–108)
CO2 SERPL-SCNC: 26 MMOL/L (ref 21–32)
CREAT SERPL-MCNC: 0.82 MG/DL (ref 0.6–1.3)
D DIMER PPP FEU-MCNC: 0.29 UG/ML FEU
EOSINOPHIL # BLD AUTO: 0.14 THOUSAND/ΜL (ref 0–0.61)
EOSINOPHIL NFR BLD AUTO: 2 % (ref 0–6)
ERYTHROCYTE [DISTWIDTH] IN BLOOD BY AUTOMATED COUNT: 12.3 % (ref 11.6–15.1)
GFR SERPL CREATININE-BSD FRML MDRD: 123 ML/MIN/1.73SQ M
GLUCOSE SERPL-MCNC: 110 MG/DL (ref 65–140)
HCT VFR BLD AUTO: 47.9 % (ref 36.5–49.3)
HGB BLD-MCNC: 15.6 G/DL (ref 12–17)
IMM GRANULOCYTES # BLD AUTO: 0.03 THOUSAND/UL (ref 0–0.2)
IMM GRANULOCYTES NFR BLD AUTO: 0 % (ref 0–2)
LYMPHOCYTES # BLD AUTO: 1.83 THOUSANDS/ΜL (ref 0.6–4.47)
LYMPHOCYTES NFR BLD AUTO: 21 % (ref 14–44)
MCH RBC QN AUTO: 29.7 PG (ref 26.8–34.3)
MCHC RBC AUTO-ENTMCNC: 32.6 G/DL (ref 31.4–37.4)
MCV RBC AUTO: 91 FL (ref 82–98)
MONOCYTES # BLD AUTO: 0.65 THOUSAND/ΜL (ref 0.17–1.22)
MONOCYTES NFR BLD AUTO: 7 % (ref 4–12)
NEUTROPHILS # BLD AUTO: 6.25 THOUSANDS/ΜL (ref 1.85–7.62)
NEUTS SEG NFR BLD AUTO: 70 % (ref 43–75)
NRBC BLD AUTO-RTO: 0 /100 WBCS
PLATELET # BLD AUTO: 217 THOUSANDS/UL (ref 149–390)
PMV BLD AUTO: 9.7 FL (ref 8.9–12.7)
POTASSIUM SERPL-SCNC: 4.1 MMOL/L (ref 3.5–5.3)
RBC # BLD AUTO: 5.25 MILLION/UL (ref 3.88–5.62)
SODIUM SERPL-SCNC: 139 MMOL/L (ref 136–145)
TROPONIN I SERPL-MCNC: <0.02 NG/ML
WBC # BLD AUTO: 8.92 THOUSAND/UL (ref 4.31–10.16)

## 2020-09-26 PROCEDURE — 80048 BASIC METABOLIC PNL TOTAL CA: CPT | Performed by: EMERGENCY MEDICINE

## 2020-09-26 PROCEDURE — 93005 ELECTROCARDIOGRAM TRACING: CPT

## 2020-09-26 PROCEDURE — 99285 EMERGENCY DEPT VISIT HI MDM: CPT

## 2020-09-26 PROCEDURE — 80061 LIPID PANEL: CPT

## 2020-09-26 PROCEDURE — 36415 COLL VENOUS BLD VENIPUNCTURE: CPT | Performed by: EMERGENCY MEDICINE

## 2020-09-26 PROCEDURE — 80053 COMPREHEN METABOLIC PANEL: CPT

## 2020-09-26 PROCEDURE — 71046 X-RAY EXAM CHEST 2 VIEWS: CPT

## 2020-09-26 PROCEDURE — 85025 COMPLETE CBC W/AUTO DIFF WBC: CPT | Performed by: EMERGENCY MEDICINE

## 2020-09-26 PROCEDURE — 84443 ASSAY THYROID STIM HORMONE: CPT

## 2020-09-26 PROCEDURE — 83036 HEMOGLOBIN GLYCOSYLATED A1C: CPT

## 2020-09-26 PROCEDURE — 84484 ASSAY OF TROPONIN QUANT: CPT | Performed by: EMERGENCY MEDICINE

## 2020-09-26 PROCEDURE — 85379 FIBRIN DEGRADATION QUANT: CPT | Performed by: EMERGENCY MEDICINE

## 2020-09-26 PROCEDURE — 99285 EMERGENCY DEPT VISIT HI MDM: CPT | Performed by: EMERGENCY MEDICINE

## 2020-09-27 VITALS
DIASTOLIC BLOOD PRESSURE: 68 MMHG | HEART RATE: 94 BPM | BODY MASS INDEX: 56.12 KG/M2 | TEMPERATURE: 98.8 F | SYSTOLIC BLOOD PRESSURE: 132 MMHG | RESPIRATION RATE: 18 BRPM | WEIGHT: 315 LBS | OXYGEN SATURATION: 90 %

## 2020-09-27 PROBLEM — E66.01 MORBID OBESITY (HCC): Status: ACTIVE | Noted: 2020-09-27

## 2020-09-27 PROBLEM — F41.9 ANXIETY: Status: ACTIVE | Noted: 2020-09-27

## 2020-09-27 PROBLEM — R07.9 CHEST PAIN: Status: ACTIVE | Noted: 2020-09-27

## 2020-09-27 LAB
ATRIAL RATE: 108 BPM
ATRIAL RATE: 83 BPM
ATRIAL RATE: 86 BPM
ATRIAL RATE: 87 BPM
ATRIAL RATE: 88 BPM
P AXIS: 47 DEGREES
P AXIS: 51 DEGREES
P AXIS: 56 DEGREES
P AXIS: 57 DEGREES
P AXIS: 60 DEGREES
PLATELET # BLD AUTO: 197 THOUSANDS/UL (ref 149–390)
PMV BLD AUTO: 9.6 FL (ref 8.9–12.7)
PR INTERVAL: 166 MS
PR INTERVAL: 180 MS
PR INTERVAL: 184 MS
QRS AXIS: 30 DEGREES
QRS AXIS: 32 DEGREES
QRS AXIS: 35 DEGREES
QRS AXIS: 37 DEGREES
QRS AXIS: 42 DEGREES
QRSD INTERVAL: 86 MS
QRSD INTERVAL: 88 MS
QRSD INTERVAL: 90 MS
QT INTERVAL: 324 MS
QT INTERVAL: 350 MS
QT INTERVAL: 360 MS
QT INTERVAL: 366 MS
QT INTERVAL: 368 MS
QTC INTERVAL: 421 MS
QTC INTERVAL: 423 MS
QTC INTERVAL: 434 MS
QTC INTERVAL: 440 MS
QTC INTERVAL: 442 MS
T WAVE AXIS: 41 DEGREES
T WAVE AXIS: 43 DEGREES
T WAVE AXIS: 44 DEGREES
T WAVE AXIS: 47 DEGREES
T WAVE AXIS: 49 DEGREES
TROPONIN I SERPL-MCNC: <0.02 NG/ML
VENTRICULAR RATE: 108 BPM
VENTRICULAR RATE: 83 BPM
VENTRICULAR RATE: 86 BPM
VENTRICULAR RATE: 87 BPM
VENTRICULAR RATE: 88 BPM

## 2020-09-27 PROCEDURE — 36415 COLL VENOUS BLD VENIPUNCTURE: CPT | Performed by: EMERGENCY MEDICINE

## 2020-09-27 PROCEDURE — 99220 PR INITIAL OBSERVATION CARE/DAY 70 MINUTES: CPT | Performed by: PHYSICIAN ASSISTANT

## 2020-09-27 PROCEDURE — 84484 ASSAY OF TROPONIN QUANT: CPT | Performed by: PHYSICIAN ASSISTANT

## 2020-09-27 PROCEDURE — 93005 ELECTROCARDIOGRAM TRACING: CPT

## 2020-09-27 PROCEDURE — 84484 ASSAY OF TROPONIN QUANT: CPT | Performed by: EMERGENCY MEDICINE

## 2020-09-27 PROCEDURE — 99217 PR OBSERVATION CARE DISCHARGE MANAGEMENT: CPT | Performed by: HOSPITALIST

## 2020-09-27 PROCEDURE — 93010 ELECTROCARDIOGRAM REPORT: CPT | Performed by: INTERNAL MEDICINE

## 2020-09-27 PROCEDURE — 85049 AUTOMATED PLATELET COUNT: CPT | Performed by: PHYSICIAN ASSISTANT

## 2020-09-27 RX ORDER — ACETAMINOPHEN 325 MG/1
650 TABLET ORAL EVERY 6 HOURS PRN
Status: DISCONTINUED | OUTPATIENT
Start: 2020-09-27 | End: 2020-09-27 | Stop reason: HOSPADM

## 2020-09-27 RX ORDER — ALBUTEROL SULFATE 90 UG/1
2 AEROSOL, METERED RESPIRATORY (INHALATION) EVERY 6 HOURS PRN
Status: DISCONTINUED | OUTPATIENT
Start: 2020-09-27 | End: 2020-09-27 | Stop reason: HOSPADM

## 2020-09-27 RX ADMIN — ACETAMINOPHEN 650 MG: 325 TABLET, FILM COATED ORAL at 01:54

## 2020-09-27 RX ADMIN — ACETAMINOPHEN 650 MG: 325 TABLET, FILM COATED ORAL at 09:23

## 2020-09-29 ENCOUNTER — TELEPHONE (OUTPATIENT)
Dept: PSYCHIATRY | Facility: CLINIC | Age: 25
End: 2020-09-29

## 2020-09-29 ENCOUNTER — OFFICE VISIT (OUTPATIENT)
Dept: FAMILY MEDICINE CLINIC | Facility: CLINIC | Age: 25
End: 2020-09-29

## 2020-09-29 ENCOUNTER — HOSPITAL ENCOUNTER (OUTPATIENT)
Dept: NON INVASIVE DIAGNOSTICS | Facility: CLINIC | Age: 25
Discharge: HOME | End: 2020-09-29

## 2020-09-29 ENCOUNTER — TELEPHONE (OUTPATIENT)
Dept: FAMILY MEDICINE CLINIC | Facility: CLINIC | Age: 25
End: 2020-09-29

## 2020-09-29 VITALS
HEART RATE: 120 BPM | RESPIRATION RATE: 18 BRPM | WEIGHT: 315 LBS | DIASTOLIC BLOOD PRESSURE: 86 MMHG | TEMPERATURE: 99.2 F | OXYGEN SATURATION: 94 % | SYSTOLIC BLOOD PRESSURE: 120 MMHG | BODY MASS INDEX: 45.1 KG/M2 | HEIGHT: 70 IN

## 2020-09-29 DIAGNOSIS — F41.9 ANXIETY: Primary | ICD-10-CM

## 2020-09-29 DIAGNOSIS — R06.83 SNORING: ICD-10-CM

## 2020-09-29 DIAGNOSIS — R73.01 IFG (IMPAIRED FASTING GLUCOSE): Primary | ICD-10-CM

## 2020-09-29 DIAGNOSIS — H91.93 DECREASED HEARING OF BOTH EARS: ICD-10-CM

## 2020-09-29 DIAGNOSIS — R07.89 OTHER CHEST PAIN: ICD-10-CM

## 2020-09-29 DIAGNOSIS — E66.01 MORBID OBESITY (HCC): ICD-10-CM

## 2020-09-29 DIAGNOSIS — R40.0 DAYTIME SOMNOLENCE: ICD-10-CM

## 2020-09-29 DIAGNOSIS — J45.20 MILD INTERMITTENT ASTHMA WITHOUT COMPLICATION: ICD-10-CM

## 2020-09-29 DIAGNOSIS — R73.01 IFG (IMPAIRED FASTING GLUCOSE): ICD-10-CM

## 2020-09-29 DIAGNOSIS — R07.9 CHEST PAIN: ICD-10-CM

## 2020-09-29 DIAGNOSIS — F41.9 ANXIETY: ICD-10-CM

## 2020-09-29 LAB
ALBUMIN SERPL BCP-MCNC: 4 G/DL (ref 3.5–5)
ALP SERPL-CCNC: 74 U/L (ref 46–116)
ALT SERPL W P-5'-P-CCNC: 53 U/L (ref 12–78)
ANION GAP SERPL CALCULATED.3IONS-SCNC: 11 MMOL/L (ref 4–13)
AST SERPL W P-5'-P-CCNC: 29 U/L (ref 5–45)
BILIRUB SERPL-MCNC: 0.43 MG/DL (ref 0.2–1)
BUN SERPL-MCNC: 13 MG/DL (ref 5–25)
CALCIUM SERPL-MCNC: 9.3 MG/DL (ref 8.3–10.1)
CHEST PAIN STATEMENT: NORMAL
CHLORIDE SERPL-SCNC: 102 MMOL/L (ref 100–108)
CHOLEST SERPL-MCNC: 174 MG/DL (ref 50–200)
CO2 SERPL-SCNC: 24 MMOL/L (ref 21–32)
CREAT SERPL-MCNC: 0.8 MG/DL (ref 0.6–1.3)
EST. AVERAGE GLUCOSE BLD GHB EST-MCNC: 120 MG/DL
GFR SERPL CREATININE-BSD FRML MDRD: 124 ML/MIN/1.73SQ M
GLUCOSE SERPL-MCNC: 107 MG/DL (ref 65–140)
HBA1C MFR BLD: 5.8 %
HDLC SERPL-MCNC: 38 MG/DL
LDLC SERPL CALC-MCNC: 88 MG/DL (ref 0–100)
MAX DIASTOLIC BP: 88 MMHG
MAX HEART RATE: 184 BPM
MAX PREDICTED HEART RATE: 195 BPM
MAX. SYSTOLIC BP: 200 MMHG
POTASSIUM SERPL-SCNC: 4.4 MMOL/L (ref 3.5–5.3)
PROT SERPL-MCNC: 8 G/DL (ref 6.4–8.2)
PROTOCOL NAME: NORMAL
SODIUM SERPL-SCNC: 137 MMOL/L (ref 136–145)
TARGET HR FORMULA: NORMAL
TEST INDICATION: NORMAL
TIME IN EXERCISE PHASE: NORMAL
TRIGL SERPL-MCNC: 242 MG/DL
TSH SERPL DL<=0.05 MIU/L-ACNC: 2.32 UIU/ML (ref 0.36–3.74)

## 2020-09-29 PROCEDURE — 93018 CV STRESS TEST I&R ONLY: CPT | Performed by: INTERNAL MEDICINE

## 2020-09-29 PROCEDURE — 93017 CV STRESS TEST TRACING ONLY: CPT

## 2020-09-29 PROCEDURE — 99214 OFFICE O/P EST MOD 30 MIN: CPT | Performed by: NURSE PRACTITIONER

## 2020-09-29 PROCEDURE — 93016 CV STRESS TEST SUPVJ ONLY: CPT | Performed by: INTERNAL MEDICINE

## 2020-09-29 RX ORDER — FLUOXETINE HYDROCHLORIDE 20 MG/1
20 CAPSULE ORAL DAILY
Qty: 30 CAPSULE | Refills: 3 | Status: SHIPPED | OUTPATIENT
Start: 2020-09-29 | End: 2020-12-09 | Stop reason: SDUPTHER

## 2020-09-29 RX ORDER — FLUOXETINE 20 MG/1
20 TABLET, FILM COATED ORAL DAILY
Qty: 30 TABLET | Refills: 5 | Status: SHIPPED | OUTPATIENT
Start: 2020-09-29 | End: 2020-09-29

## 2020-09-29 NOTE — TELEPHONE ENCOUNTER
YOU GAVE PT PROZAC TABLETS BUT HE HAS NO INSURANCE AND THE TABLETS ARE 70 DOLLARS BUT CAPSULES ARE ONLY 7 CAN YOU PLS CHANGE THAT [facility]

## 2020-09-29 NOTE — TELEPHONE ENCOUNTER
PCP has referred Sergous Gray to me  Helena Tovar today  He just got new insurance but has not yet gotten his insurance card  Wants to be sure session would be covered  Agrees to contact me when he gets card and I will check if session is covered and then schedule him as a new patient visit

## 2020-09-29 NOTE — PROGRESS NOTES
Assessment/Plan:           Problem List Items Addressed This Visit        Endocrine    IFG (impaired fasting glucose) - Primary    Relevant Orders    HEMOGLOBIN A1C W/ EAG ESTIMATION    Comprehensive metabolic panel    Lipid Panel with Direct LDL reflex    TSH, 3rd generation with Free T4 reflex       Respiratory    Mild intermittent asthma without complication    Relevant Medications    mometasone-formoterol (DULERA) 100-5 MCG/ACT inhaler       Other    Anxiety    Relevant Medications    FLUoxetine (PROzac) 20 MG tablet    Other Relevant Orders    Ambulatory referral to Psychiatry    Comprehensive metabolic panel    Lipid Panel with Direct LDL reflex    TSH, 3rd generation with Free T4 reflex    Chest pain    Relevant Orders    Ambulatory referral to Cardiology    Daytime somnolence    Relevant Orders    Ambulatory referral to Sleep Medicine    Comprehensive metabolic panel    Lipid Panel with Direct LDL reflex    TSH, 3rd generation with Free T4 reflex    Morbid obesity (HCC)    Relevant Orders    Ambulatory referral to Sleep Medicine    Comprehensive metabolic panel    Lipid Panel with Direct LDL reflex    TSH, 3rd generation with Free T4 reflex    Snoring    Relevant Orders    Ambulatory referral to Sleep Medicine    Comprehensive metabolic panel    Lipid Panel with Direct LDL reflex    TSH, 3rd generation with Free T4 reflex      Other Visit Diagnoses     Decreased hearing of both ears        Relevant Orders    Ambulatory Referral to Otolaryngology            Subjective:      Patient ID: Sophia Harrison is a 22 y o  male      For the past few weeks having sob with exertion  Had panic attack the other day with sob and chest pain  Had chest pain with sob in the past  Never been diagnosed with sleep apnea but had no health insurance, now with insurance and would like to go for sleep test, was ordered in past  Did have a little pain with the stress test today, lasted 4 or 5 minutes  Tight and sharp pain  Stress test reviewed and will send cardiology and let them decide if nuclear stress is warranted    Needs labs for diabetes test, gained 32 pounds in the past 2 months    Needs inhaler for his asthma    ER doc recommended meds for anxiety  No si or hi  Family issue he wishes not to disclose but is asking for counseling/therapy    Is going to be starting low carb and protein diet    Paternal grandfather with CAD, none in mother or father  Father with diabetes and maternal grandfather, paternal grandmother and grandfather        The following portions of the patient's history were reviewed and updated as appropriate: allergies, current medications, past family history, past medical history, past social history, past surgical history and problem list     Review of Systems   Constitutional: Positive for fatigue  Negative for fever  HENT: Negative for congestion and postnasal drip  Eyes: Negative for photophobia and visual disturbance  Respiratory: Positive for cough, shortness of breath and wheezing  Cardiovascular: Positive for chest pain  Negative for palpitations  Gastrointestinal: Negative for constipation and diarrhea  Genitourinary: Negative for dysuria and flank pain  Musculoskeletal: Negative for arthralgias and myalgias  Skin: Negative for rash  Neurological: Negative for dizziness and headaches  Hematological: Negative for adenopathy  Psychiatric/Behavioral: Negative for behavioral problems, dysphoric mood and sleep disturbance  The patient is nervous/anxious  Objective: There were no vitals taken for this visit  Physical Exam  Vitals signs and nursing note reviewed  Constitutional:       Appearance: Normal appearance  HENT:      Head: Normocephalic and atraumatic        Right Ear: Tympanic membrane, ear canal and external ear normal       Left Ear: Tympanic membrane, ear canal and external ear normal       Nose: Nose normal       Mouth/Throat:      Mouth: Mucous membranes are moist    Eyes:      Conjunctiva/sclera: Conjunctivae normal    Neck:      Musculoskeletal: Normal range of motion and neck supple  Cardiovascular:      Rate and Rhythm: Normal rate and regular rhythm  Pulses: Normal pulses  Heart sounds: Normal heart sounds  Pulmonary:      Effort: Pulmonary effort is normal       Breath sounds: Normal breath sounds  Abdominal:      General: Bowel sounds are normal    Musculoskeletal: Normal range of motion  Skin:     General: Skin is warm and dry  Capillary Refill: Capillary refill takes less than 2 seconds  Neurological:      General: No focal deficit present  Mental Status: He is alert and oriented to person, place, and time  Psychiatric:         Mood and Affect: Mood normal          Behavior: Behavior normal          Thought Content:  Thought content normal          Judgment: Judgment normal

## 2020-10-26 ENCOUNTER — TELEMEDICINE (OUTPATIENT)
Dept: FAMILY MEDICINE CLINIC | Facility: CLINIC | Age: 25
End: 2020-10-26

## 2020-10-26 VITALS — TEMPERATURE: 96.3 F

## 2020-10-26 DIAGNOSIS — J06.9 ACUTE UPPER RESPIRATORY INFECTION: Primary | ICD-10-CM

## 2020-10-26 PROCEDURE — 99213 OFFICE O/P EST LOW 20 MIN: CPT | Performed by: NURSE PRACTITIONER

## 2020-10-26 RX ORDER — AMOXICILLIN 875 MG/1
875 TABLET, COATED ORAL 2 TIMES DAILY
Qty: 20 TABLET | Refills: 0 | Status: SHIPPED | OUTPATIENT
Start: 2020-10-26 | End: 2020-11-05

## 2020-11-10 ENCOUNTER — OFFICE VISIT (OUTPATIENT)
Dept: FAMILY MEDICINE CLINIC | Facility: CLINIC | Age: 25
End: 2020-11-10
Payer: COMMERCIAL

## 2020-11-10 VITALS
HEART RATE: 105 BPM | SYSTOLIC BLOOD PRESSURE: 130 MMHG | HEIGHT: 70 IN | WEIGHT: 315 LBS | DIASTOLIC BLOOD PRESSURE: 88 MMHG | RESPIRATION RATE: 20 BRPM | BODY MASS INDEX: 45.1 KG/M2 | TEMPERATURE: 99.9 F | OXYGEN SATURATION: 98 %

## 2020-11-10 DIAGNOSIS — Z23 NEED FOR INFLUENZA VACCINATION: ICD-10-CM

## 2020-11-10 DIAGNOSIS — J02.9 SORE THROAT: ICD-10-CM

## 2020-11-10 DIAGNOSIS — Z00.00 ANNUAL PHYSICAL EXAM: Primary | ICD-10-CM

## 2020-11-10 LAB — S PYO AG THROAT QL: NEGATIVE

## 2020-11-10 PROCEDURE — 3008F BODY MASS INDEX DOCD: CPT | Performed by: NURSE PRACTITIONER

## 2020-11-10 PROCEDURE — 90686 IIV4 VACC NO PRSV 0.5 ML IM: CPT

## 2020-11-10 PROCEDURE — 99395 PREV VISIT EST AGE 18-39: CPT | Performed by: NURSE PRACTITIONER

## 2020-11-10 PROCEDURE — 90471 IMMUNIZATION ADMIN: CPT

## 2020-11-10 PROCEDURE — 87880 STREP A ASSAY W/OPTIC: CPT | Performed by: NURSE PRACTITIONER

## 2020-11-10 PROCEDURE — 1036F TOBACCO NON-USER: CPT | Performed by: NURSE PRACTITIONER

## 2020-11-10 PROCEDURE — 87070 CULTURE OTHR SPECIMN AEROBIC: CPT | Performed by: NURSE PRACTITIONER

## 2020-11-10 RX ORDER — DEXAMETHASONE 4 MG/1
4 TABLET ORAL
Qty: 2 TABLET | Refills: 0 | Status: SHIPPED | OUTPATIENT
Start: 2020-11-10 | End: 2020-11-12

## 2020-11-10 RX ORDER — AZITHROMYCIN 250 MG/1
TABLET, FILM COATED ORAL
Qty: 6 TABLET | Refills: 0 | Status: SHIPPED | OUTPATIENT
Start: 2020-11-10 | End: 2020-11-10

## 2020-11-11 ENCOUNTER — TELEPHONE (OUTPATIENT)
Dept: FAMILY MEDICINE CLINIC | Facility: CLINIC | Age: 25
End: 2020-11-11

## 2020-11-12 LAB — BACTERIA THROAT CULT: NORMAL

## 2020-12-08 ENCOUNTER — SOCIAL WORK (OUTPATIENT)
Dept: BEHAVIORAL/MENTAL HEALTH CLINIC | Facility: CLINIC | Age: 25
End: 2020-12-08
Payer: COMMERCIAL

## 2020-12-08 DIAGNOSIS — F41.9 ANXIETY: ICD-10-CM

## 2020-12-08 PROCEDURE — 90834 PSYTX W PT 45 MINUTES: CPT | Performed by: SOCIAL WORKER

## 2020-12-09 DIAGNOSIS — F41.9 ANXIETY: ICD-10-CM

## 2020-12-09 RX ORDER — FLUOXETINE HYDROCHLORIDE 40 MG/1
40 CAPSULE ORAL DAILY
Qty: 30 CAPSULE | Refills: 5 | Status: SHIPPED | OUTPATIENT
Start: 2020-12-09 | End: 2021-01-25 | Stop reason: SDUPTHER

## 2020-12-14 ENCOUNTER — TELEPHONE (OUTPATIENT)
Dept: SLEEP CENTER | Facility: CLINIC | Age: 25
End: 2020-12-14

## 2020-12-14 ENCOUNTER — OFFICE VISIT (OUTPATIENT)
Dept: SLEEP CENTER | Facility: CLINIC | Age: 25
End: 2020-12-14
Payer: COMMERCIAL

## 2020-12-14 VITALS
DIASTOLIC BLOOD PRESSURE: 80 MMHG | HEIGHT: 70 IN | WEIGHT: 315 LBS | BODY MASS INDEX: 45.1 KG/M2 | SYSTOLIC BLOOD PRESSURE: 120 MMHG

## 2020-12-14 DIAGNOSIS — R40.0 DAYTIME SOMNOLENCE: ICD-10-CM

## 2020-12-14 DIAGNOSIS — Z01.812 ENCOUNTER FOR PREPROCEDURE SCREENING LABORATORY TESTING FOR COVID-19: Primary | ICD-10-CM

## 2020-12-14 DIAGNOSIS — J45.20 MILD INTERMITTENT ASTHMA WITHOUT COMPLICATION: ICD-10-CM

## 2020-12-14 DIAGNOSIS — R29.818 SUSPECTED SLEEP APNEA: Primary | ICD-10-CM

## 2020-12-14 DIAGNOSIS — Z20.822 ENCOUNTER FOR PREPROCEDURE SCREENING LABORATORY TESTING FOR COVID-19: Primary | ICD-10-CM

## 2020-12-14 DIAGNOSIS — R06.83 SNORING: ICD-10-CM

## 2020-12-14 DIAGNOSIS — E66.01 CLASS 3 SEVERE OBESITY WITH BODY MASS INDEX (BMI) OF 50.0 TO 59.9 IN ADULT, UNSPECIFIED OBESITY TYPE, UNSPECIFIED WHETHER SERIOUS COMORBIDITY PRESENT (HCC): ICD-10-CM

## 2020-12-14 DIAGNOSIS — R06.89 GASPING FOR BREATH: ICD-10-CM

## 2020-12-14 DIAGNOSIS — F41.9 ANXIETY: ICD-10-CM

## 2020-12-14 DIAGNOSIS — G47.30 OBSERVED SLEEP APNEA: ICD-10-CM

## 2020-12-14 DIAGNOSIS — E66.01 MORBID OBESITY (HCC): ICD-10-CM

## 2020-12-14 DIAGNOSIS — G47.19 EXCESSIVE DAYTIME SLEEPINESS: ICD-10-CM

## 2020-12-14 PROCEDURE — 99214 OFFICE O/P EST MOD 30 MIN: CPT | Performed by: PSYCHIATRY & NEUROLOGY

## 2020-12-14 PROCEDURE — 1036F TOBACCO NON-USER: CPT | Performed by: PSYCHIATRY & NEUROLOGY

## 2020-12-14 PROCEDURE — 3008F BODY MASS INDEX DOCD: CPT | Performed by: PSYCHIATRY & NEUROLOGY

## 2020-12-18 DIAGNOSIS — Z20.822 ENCOUNTER FOR PREPROCEDURE SCREENING LABORATORY TESTING FOR COVID-19: ICD-10-CM

## 2020-12-18 DIAGNOSIS — Z01.812 ENCOUNTER FOR PREPROCEDURE SCREENING LABORATORY TESTING FOR COVID-19: ICD-10-CM

## 2020-12-18 PROCEDURE — U0003 INFECTIOUS AGENT DETECTION BY NUCLEIC ACID (DNA OR RNA); SEVERE ACUTE RESPIRATORY SYNDROME CORONAVIRUS 2 (SARS-COV-2) (CORONAVIRUS DISEASE [COVID-19]), AMPLIFIED PROBE TECHNIQUE, MAKING USE OF HIGH THROUGHPUT TECHNOLOGIES AS DESCRIBED BY CMS-2020-01-R: HCPCS | Performed by: PSYCHIATRY & NEUROLOGY

## 2020-12-20 LAB — SARS-COV-2 RNA SPEC QL NAA+PROBE: NOT DETECTED

## 2020-12-21 ENCOUNTER — TELEPHONE (OUTPATIENT)
Dept: SLEEP CENTER | Facility: CLINIC | Age: 25
End: 2020-12-21

## 2020-12-21 ENCOUNTER — HOSPITAL ENCOUNTER (OUTPATIENT)
Dept: SLEEP CENTER | Facility: CLINIC | Age: 25
Discharge: HOME | End: 2020-12-21
Payer: COMMERCIAL

## 2020-12-21 DIAGNOSIS — R40.0 DAYTIME SOMNOLENCE: ICD-10-CM

## 2020-12-21 DIAGNOSIS — E66.01 MORBID OBESITY (HCC): ICD-10-CM

## 2020-12-21 DIAGNOSIS — R29.818 SUSPECTED SLEEP APNEA: ICD-10-CM

## 2020-12-21 DIAGNOSIS — G47.19 EXCESSIVE DAYTIME SLEEPINESS: ICD-10-CM

## 2020-12-21 DIAGNOSIS — E66.01 CLASS 3 SEVERE OBESITY WITH BODY MASS INDEX (BMI) OF 50.0 TO 59.9 IN ADULT, UNSPECIFIED OBESITY TYPE, UNSPECIFIED WHETHER SERIOUS COMORBIDITY PRESENT (HCC): ICD-10-CM

## 2020-12-21 DIAGNOSIS — J45.20 MILD INTERMITTENT ASTHMA WITHOUT COMPLICATION: ICD-10-CM

## 2020-12-21 DIAGNOSIS — R06.83 SNORING: ICD-10-CM

## 2020-12-21 DIAGNOSIS — G47.30 OBSERVED SLEEP APNEA: ICD-10-CM

## 2020-12-21 DIAGNOSIS — R06.89 GASPING FOR BREATH: ICD-10-CM

## 2020-12-21 DIAGNOSIS — F41.9 ANXIETY: ICD-10-CM

## 2020-12-21 PROCEDURE — 95811 POLYSOM 6/>YRS CPAP 4/> PARM: CPT | Performed by: PSYCHIATRY & NEUROLOGY

## 2020-12-21 PROCEDURE — 95811 POLYSOM 6/>YRS CPAP 4/> PARM: CPT

## 2020-12-21 NOTE — TELEPHONE ENCOUNTER
Peer to peer set up for today 12/21/20 at 12 noon with Dr Terra Ferguson, they have your cell phone number to call  Thank you!

## 2020-12-22 DIAGNOSIS — G47.33 OBSTRUCTIVE SLEEP APNEA (ADULT) (PEDIATRIC): Primary | ICD-10-CM

## 2020-12-23 ENCOUNTER — TELEPHONE (OUTPATIENT)
Dept: SLEEP CENTER | Facility: CLINIC | Age: 25
End: 2020-12-23

## 2020-12-28 ENCOUNTER — TELEMEDICINE (OUTPATIENT)
Dept: FAMILY MEDICINE CLINIC | Facility: CLINIC | Age: 25
End: 2020-12-28
Payer: COMMERCIAL

## 2020-12-28 DIAGNOSIS — Z20.822 EXPOSURE TO COVID-19 VIRUS: ICD-10-CM

## 2020-12-28 DIAGNOSIS — B34.9 VIRAL INFECTION, UNSPECIFIED: Primary | ICD-10-CM

## 2020-12-28 PROCEDURE — 99213 OFFICE O/P EST LOW 20 MIN: CPT | Performed by: NURSE PRACTITIONER

## 2020-12-29 DIAGNOSIS — B34.9 VIRAL INFECTION, UNSPECIFIED: ICD-10-CM

## 2020-12-29 DIAGNOSIS — Z20.822 EXPOSURE TO COVID-19 VIRUS: ICD-10-CM

## 2020-12-29 PROCEDURE — U0003 INFECTIOUS AGENT DETECTION BY NUCLEIC ACID (DNA OR RNA); SEVERE ACUTE RESPIRATORY SYNDROME CORONAVIRUS 2 (SARS-COV-2) (CORONAVIRUS DISEASE [COVID-19]), AMPLIFIED PROBE TECHNIQUE, MAKING USE OF HIGH THROUGHPUT TECHNOLOGIES AS DESCRIBED BY CMS-2020-01-R: HCPCS | Performed by: NURSE PRACTITIONER

## 2020-12-30 LAB — SARS-COV-2 RNA SPEC QL NAA+PROBE: NOT DETECTED

## 2020-12-31 ENCOUNTER — TELEMEDICINE (OUTPATIENT)
Dept: FAMILY MEDICINE CLINIC | Facility: CLINIC | Age: 25
End: 2020-12-31
Payer: COMMERCIAL

## 2020-12-31 DIAGNOSIS — G47.33 OBSTRUCTIVE SLEEP APNEA (ADULT) (PEDIATRIC): ICD-10-CM

## 2020-12-31 DIAGNOSIS — R20.2 NUMBNESS AND TINGLING IN BOTH HANDS: ICD-10-CM

## 2020-12-31 DIAGNOSIS — R20.0 NUMBNESS AND TINGLING IN BOTH HANDS: ICD-10-CM

## 2020-12-31 DIAGNOSIS — E66.01 MORBID OBESITY (HCC): ICD-10-CM

## 2020-12-31 DIAGNOSIS — R73.01 IFG (IMPAIRED FASTING GLUCOSE): Primary | ICD-10-CM

## 2020-12-31 PROCEDURE — 99214 OFFICE O/P EST MOD 30 MIN: CPT | Performed by: NURSE PRACTITIONER

## 2020-12-31 NOTE — PROGRESS NOTES
Virtual Regular Visit      Assessment/Plan:    Problem List Items Addressed This Visit        Endocrine    IFG (impaired fasting glucose) - Primary    Relevant Orders    Ambulatory referral to Bariatric Surgery    TSH, 3rd generation with Free T4 reflex    HEMOGLOBIN A1C W/ EAG ESTIMATION    Comprehensive metabolic panel       Respiratory    Obstructive sleep apnea (adult) (pediatric)    Relevant Orders    Ambulatory referral to Bariatric Surgery    TSH, 3rd generation with Free T4 reflex    HEMOGLOBIN A1C W/ EAG ESTIMATION    Comprehensive metabolic panel       Other    Morbid obesity ([address] )    Relevant Orders    Ambulatory referral to Bariatric Surgery    TSH, 3rd generation with Free T4 reflex    HEMOGLOBIN A1C W/ EAG ESTIMATION    Comprehensive metabolic panel    Numbness and tingling in both hands    Relevant Orders    EMG 2 Limb Upper Extremity               Reason for visit is   Chief Complaint   Patient presents with    Virtual Regular Visit        Encounter provider PRIMO Boles    Provider located at [address]      Recent Visits  Date Type Provider Dept   12/31/20 [address] Scott Goyal, [address] [place] [address]   12/28/20 [facility] PRIMO Carlson [facility]   Showing recent visits within past 7 days and meeting all other requirements     Future Appointments  No visits were found meeting these conditions  Showing future appointments within next 150 days and meeting all other requirements        The patient was identified by name and date of birth  Ceci Alexandre was informed that this is a telemedicine visit and that the visit is being conducted through [address] [place] [address] and patient was informed that this is not a secure, HIPAA-compliant platform  He agrees to proceed     My office door was closed  No one else was in the room    He acknowledged consent and understanding of privacy and security of the video platform  The patient has agreed to participate and understands they can discontinue the visit at any time  Patient is aware this is a billable service  Subjective  Avinash Hernandez is a 22 y o  male    Visit for c/o bilateral hand numbness and tingling of first and second digit of both hands  Was told in past he had carpal tunnel  Never had emg that he can remember  Also questions regarding gastric bypass, weight continues to increase         Past Medical History:   Diagnosis Date    Ankle fracture     right    Fall [id number]    "fell off of a roof"    Mild intermittent asthma        Past Surgical History:   Procedure Laterality Date    TOOTH EXTRACTION         Current Outpatient Medications   Medication Sig Dispense Refill    al mag oxide-diphenhydramine-lidocaine viscous (MAGIC MOUTHWASH) 1:1:1 suspension Swish and spit 10 mL every 4 (four) hours as needed for mouth pain or discomfort 90 mL 1    albuterol (VENTOLIN HFA) 90 mcg/act inhaler Inhale 2 puffs      FLUoxetine (PROzac) 40 MG capsule Take 1 capsule (40 mg total) by mouth daily 30 capsule 5    fluticasone (FLOVENT HFA) 44 mcg/act inhaler Inhale 2 puffs every 12 (twelve) hours      mometasone-formoterol (DULERA) 100-5 MCG/ACT inhaler Inhale 2 puffs 2 (two) times a day Rinse mouth after use  (Patient not taking: Reported on 12/14/2020) 1 Inhaler 1     No current facility-administered medications for this visit  No Known Allergies    Review of Systems   Constitutional: Negative for fatigue and fever  Respiratory: Negative for cough and shortness of breath  Cardiovascular: Negative for chest pain and palpitations  Gastrointestinal: Negative for constipation and diarrhea  Musculoskeletal: Negative for arthralgias and myalgias  Skin: Negative for rash  Neurological: Positive for numbness  Hematological: Negative for adenopathy  Psychiatric/Behavioral: Negative for dysphoric mood and sleep disturbance   The patient is not nervous/anxious  Video Exam    There were no vitals filed for this visit  Physical Exam  Constitutional:       Appearance: Normal appearance  HENT:      Head: Normocephalic and atraumatic  Eyes:      Conjunctiva/sclera: Conjunctivae normal    Pulmonary:      Effort: Pulmonary effort is normal    Musculoskeletal: Normal range of motion  Neurological:      Mental Status: He is alert and oriented to person, place, and time  Psychiatric:         Mood and Affect: Mood normal          Behavior: Behavior normal           I spent 15 minutes with patient today in which greater than 50% of the time was spent in counseling/coordination of care regarding obesity, hand numbness, elevated blood sugars      VIRTUAL VISIT DISCLAIMER    Subhash Patino acknowledges that he has consented to an online visit or consultation  He understands that the online visit is based solely on information provided by him, and that, in the absence of a face-to-face physical evaluation by the physician, the diagnosis he receives is both limited and provisional in terms of accuracy and completeness  This is not intended to replace a full medical face-to-face evaluation by the physician  Subhash Patino understands and accepts these terms

## 2021-01-01 PROBLEM — K59.00 CONSTIPATION: Status: RESOLVED | Noted: 2020-04-28 | Resolved: 2021-01-01

## 2021-01-01 PROBLEM — R20.2 NUMBNESS AND TINGLING IN BOTH HANDS: Status: ACTIVE | Noted: 2021-01-01

## 2021-01-01 PROBLEM — K12.0 APHTHOUS ULCER: Status: RESOLVED | Noted: 2020-07-16 | Resolved: 2021-01-01

## 2021-01-01 PROBLEM — R20.0 NUMBNESS AND TINGLING IN BOTH HANDS: Status: ACTIVE | Noted: 2021-01-01

## 2021-01-01 PROBLEM — R07.9 CHEST PAIN: Status: RESOLVED | Noted: 2020-09-27 | Resolved: 2021-01-01

## 2021-01-21 ENCOUNTER — TRANSCRIBE ORDERS (OUTPATIENT)
Dept: LAB | Facility: CLINIC | Age: 26
End: 2021-01-21

## 2021-01-21 ENCOUNTER — TELEMEDICINE (OUTPATIENT)
Dept: FAMILY MEDICINE CLINIC | Facility: CLINIC | Age: 26
End: 2021-01-21
Payer: COMMERCIAL

## 2021-01-21 ENCOUNTER — APPOINTMENT (OUTPATIENT)
Dept: LAB | Facility: CLINIC | Age: 26
End: 2021-01-21
Payer: COMMERCIAL

## 2021-01-21 DIAGNOSIS — R51.9 NONINTRACTABLE EPISODIC HEADACHE, UNSPECIFIED HEADACHE TYPE: ICD-10-CM

## 2021-01-21 DIAGNOSIS — R40.0 DAYTIME SOMNOLENCE: ICD-10-CM

## 2021-01-21 DIAGNOSIS — F41.9 ANXIETY: ICD-10-CM

## 2021-01-21 DIAGNOSIS — R41.3 POOR SHORT TERM MEMORY: ICD-10-CM

## 2021-01-21 DIAGNOSIS — E66.01 CLASS 3 SEVERE OBESITY DUE TO EXCESS CALORIES WITHOUT SERIOUS COMORBIDITY WITH BODY MASS INDEX (BMI) OF 45.0 TO 49.9 IN ADULT (HCC): Primary | ICD-10-CM

## 2021-01-21 DIAGNOSIS — E66.01 CLASS 3 SEVERE OBESITY DUE TO EXCESS CALORIES WITHOUT SERIOUS COMORBIDITY WITH BODY MASS INDEX (BMI) OF 45.0 TO 49.9 IN ADULT (HCC): ICD-10-CM

## 2021-01-21 DIAGNOSIS — E66.01 MORBID OBESITY (HCC): ICD-10-CM

## 2021-01-21 DIAGNOSIS — R73.01 IFG (IMPAIRED FASTING GLUCOSE): ICD-10-CM

## 2021-01-21 DIAGNOSIS — G47.33 OBSTRUCTIVE SLEEP APNEA (ADULT) (PEDIATRIC): ICD-10-CM

## 2021-01-21 DIAGNOSIS — R06.83 SNORING: ICD-10-CM

## 2021-01-21 DIAGNOSIS — R06.81 APNEA: ICD-10-CM

## 2021-01-21 DIAGNOSIS — K59.00 CONSTIPATION, UNSPECIFIED CONSTIPATION TYPE: ICD-10-CM

## 2021-01-21 LAB
ALBUMIN SERPL BCP-MCNC: 3.9 G/DL (ref 3.5–5)
ALP SERPL-CCNC: 60 U/L (ref 46–116)
ALT SERPL W P-5'-P-CCNC: 67 U/L (ref 12–78)
ANION GAP SERPL CALCULATED.3IONS-SCNC: 4 MMOL/L (ref 4–13)
AST SERPL W P-5'-P-CCNC: 29 U/L (ref 5–45)
BASOPHILS # BLD AUTO: 0.02 THOUSANDS/ΜL (ref 0–0.1)
BASOPHILS NFR BLD AUTO: 0 % (ref 0–1)
BILIRUB SERPL-MCNC: 0.49 MG/DL (ref 0.2–1)
BUN SERPL-MCNC: 12 MG/DL (ref 5–25)
CALCIUM SERPL-MCNC: 9.1 MG/DL (ref 8.3–10.1)
CHLORIDE SERPL-SCNC: 103 MMOL/L (ref 100–108)
CHOLEST SERPL-MCNC: 172 MG/DL (ref 50–200)
CO2 SERPL-SCNC: 30 MMOL/L (ref 21–32)
CREAT SERPL-MCNC: 0.77 MG/DL (ref 0.6–1.3)
EOSINOPHIL # BLD AUTO: 0.2 THOUSAND/ΜL (ref 0–0.61)
EOSINOPHIL NFR BLD AUTO: 3 % (ref 0–6)
ERYTHROCYTE [DISTWIDTH] IN BLOOD BY AUTOMATED COUNT: 12.6 % (ref 11.6–15.1)
EST. AVERAGE GLUCOSE BLD GHB EST-MCNC: 120 MG/DL
GFR SERPL CREATININE-BSD FRML MDRD: 126 ML/MIN/1.73SQ M
GLUCOSE P FAST SERPL-MCNC: 99 MG/DL (ref 65–99)
HBA1C MFR BLD: 5.8 %
HCT VFR BLD AUTO: 50.3 % (ref 36.5–49.3)
HDLC SERPL-MCNC: 41 MG/DL
HGB BLD-MCNC: 16.3 G/DL (ref 12–17)
IMM GRANULOCYTES # BLD AUTO: 0.01 THOUSAND/UL (ref 0–0.2)
IMM GRANULOCYTES NFR BLD AUTO: 0 % (ref 0–2)
LDLC SERPL CALC-MCNC: 105 MG/DL (ref 0–100)
LYMPHOCYTES # BLD AUTO: 2.16 THOUSANDS/ΜL (ref 0.6–4.47)
LYMPHOCYTES NFR BLD AUTO: 35 % (ref 14–44)
MCH RBC QN AUTO: 29.4 PG (ref 26.8–34.3)
MCHC RBC AUTO-ENTMCNC: 32.4 G/DL (ref 31.4–37.4)
MCV RBC AUTO: 91 FL (ref 82–98)
MONOCYTES # BLD AUTO: 0.44 THOUSAND/ΜL (ref 0.17–1.22)
MONOCYTES NFR BLD AUTO: 7 % (ref 4–12)
NEUTROPHILS # BLD AUTO: 3.34 THOUSANDS/ΜL (ref 1.85–7.62)
NEUTS SEG NFR BLD AUTO: 55 % (ref 43–75)
NRBC BLD AUTO-RTO: 0 /100 WBCS
PLATELET # BLD AUTO: 216 THOUSANDS/UL (ref 149–390)
PMV BLD AUTO: 9.5 FL (ref 8.9–12.7)
POTASSIUM SERPL-SCNC: 4.4 MMOL/L (ref 3.5–5.3)
PROT SERPL-MCNC: 7.3 G/DL (ref 6.4–8.2)
RBC # BLD AUTO: 5.54 MILLION/UL (ref 3.88–5.62)
SODIUM SERPL-SCNC: 137 MMOL/L (ref 136–145)
TRIGL SERPL-MCNC: 130 MG/DL
TSH SERPL DL<=0.05 MIU/L-ACNC: 1.85 UIU/ML (ref 0.36–3.74)
WBC # BLD AUTO: 6.17 THOUSAND/UL (ref 4.31–10.16)

## 2021-01-21 PROCEDURE — 80053 COMPREHEN METABOLIC PANEL: CPT

## 2021-01-21 PROCEDURE — 84443 ASSAY THYROID STIM HORMONE: CPT

## 2021-01-21 PROCEDURE — 99213 OFFICE O/P EST LOW 20 MIN: CPT | Performed by: NURSE PRACTITIONER

## 2021-01-21 PROCEDURE — 80061 LIPID PANEL: CPT

## 2021-01-21 PROCEDURE — 85025 COMPLETE CBC W/AUTO DIFF WBC: CPT

## 2021-01-21 PROCEDURE — 36415 COLL VENOUS BLD VENIPUNCTURE: CPT

## 2021-01-21 PROCEDURE — 83036 HEMOGLOBIN GLYCOSYLATED A1C: CPT

## 2021-01-21 NOTE — PROGRESS NOTES
Virtual Regular Visit      Assessment/Plan:    Problem List Items Addressed This Visit        Other    Class 3 severe obesity due to excess calories without serious comorbidity with body mass index (BMI) of 45 0 to 49 9 in adult [facility]) - Primary    Relevant Orders    Ambulatory referral to Bariatric Surgery    Anxiety     Cont meds  To go for psychotherapy                 BMI Counseling: There is no height or weight on file to calculate BMI  The BMI is above normal  Nutrition recommendations include decreasing portion sizes, encouraging healthy choices of fruits and vegetables, decreasing fast food intake, consuming healthier snacks, limiting drinks that contain sugar, moderation in carbohydrate intake, increasing intake of lean protein, reducing intake of saturated and trans fat and reducing intake of cholesterol  Exercise recommendations include moderate physical activity 150 minutes/week, exercising 3-5 times per week and strength training exercises  No pharmacotherapy was ordered  Depression Screening Follow-up Plan: Patient's depression screening was positive with a PHQ-2 score of 0  Their PHQ-9 score was   Patient assessed for underlying major depression  They have no active suicidal ideations  Brief counseling provided and recommend additional follow-up/re-evaluation next office visit  Continue regular follow-up with their psychologist/therapist/psychiatrist who is managing their mental health condition(s)  Reason for visit is   Chief Complaint   Patient presents with    Virtual Regular Visit        Encounter provider Delos Collet, CRNP    Provider located at [address] [address]      Recent Visits  No visits were found meeting these conditions     Showing recent visits within past 7 days and meeting all other requirements     Today's Visits  Date Type Provider Dept   01/21/21 3096 [facility]'s visits and meeting all other requirements     Future Appointments  No visits were found meeting these conditions  Showing future appointments within next 150 days and meeting all other requirements        The patient was identified by name and date of birth  Kain Loevlace was informed that this is a telemedicine visit and that the visit is being conducted through [facility] [place] and patient was informed that this is not a secure, HIPAA-compliant platform  He agrees to proceed     My office door was closed  No one else was in the room  He acknowledged consent and understanding of privacy and security of the video platform  The patient has agreed to participate and understands they can discontinue the visit at any time  Patient is aware this is a billable service  Subjective  Kain Lovelace is a 22 y o  male    F/u to anxiety  Concerned he may have a mood disorder  No si or hi  Increased fluoxetine and doing somewhat better  Baby daughter born last week at 35 weeks gestation  In NICU but doing well  Plans to have visit with Starla Shirley         Past Medical History:   Diagnosis Date    Ankle fracture     right    Fall [id number]    "fell off of a roof"    Mild intermittent asthma        Past Surgical History:   Procedure Laterality Date    TOOTH EXTRACTION         Current Outpatient Medications   Medication Sig Dispense Refill    al mag oxide-diphenhydramine-lidocaine viscous (MAGIC MOUTHWASH) 1:1:1 suspension Swish and spit 10 mL every 4 (four) hours as needed for mouth pain or discomfort 90 mL 1    albuterol (VENTOLIN HFA) 90 mcg/act inhaler Inhale 2 puffs      FLUoxetine (PROzac) 40 MG capsule Take 1 capsule (40 mg total) by mouth daily 30 capsule 5    fluticasone (FLOVENT HFA) 44 mcg/act inhaler Inhale 2 puffs every 12 (twelve) hours      mometasone-formoterol (DULERA) 100-5 MCG/ACT inhaler Inhale 2 puffs 2 (two) times a day Rinse mouth after use   (Patient not taking: Reported on 12/14/2020) 1 Inhaler 1     No current facility-administered medications for this visit  No Known Allergies    Review of Systems   Constitutional: Negative for fatigue and fever  Respiratory: Negative for cough and shortness of breath  Cardiovascular: Negative for chest pain and palpitations  Gastrointestinal: Negative for constipation and diarrhea  Musculoskeletal: Negative for myalgias  Neurological: Negative for dizziness and headaches  Hematological: Negative for adenopathy  Psychiatric/Behavioral: Negative for dysphoric mood and sleep disturbance  The patient is nervous/anxious  Video Exam    There were no vitals filed for this visit  Physical Exam  Constitutional:       Appearance: Normal appearance  HENT:      Head: Normocephalic and atraumatic  Eyes:      Conjunctiva/sclera: Conjunctivae normal    Pulmonary:      Effort: Pulmonary effort is normal    Musculoskeletal: Normal range of motion  Neurological:      Mental Status: He is alert and oriented to person, place, and time  Psychiatric:         Mood and Affect: Mood normal          Behavior: Behavior normal           I spent 15 minutes with patient today in which greater than 50% of the time was spent in counseling/coordination of care regarding anxiety      VIRTUAL VISIT DISCLAIMER    Edilson Mann acknowledges that he has consented to an online visit or consultation  He understands that the online visit is based solely on information provided by him, and that, in the absence of a face-to-face physical evaluation by the physician, the diagnosis he receives is both limited and provisional in terms of accuracy and completeness  This is not intended to replace a full medical face-to-face evaluation by the physician  Edilson Mann understands and accepts these terms

## 2021-01-25 ENCOUNTER — TELEPHONE (OUTPATIENT)
Dept: FAMILY MEDICINE CLINIC | Facility: CLINIC | Age: 26
End: 2021-01-25

## 2021-01-25 DIAGNOSIS — F41.9 ANXIETY: ICD-10-CM

## 2021-01-25 RX ORDER — FLUOXETINE HYDROCHLORIDE 40 MG/1
40 CAPSULE ORAL DAILY
Qty: 30 CAPSULE | Refills: 5 | Status: SHIPPED | OUTPATIENT
Start: 2021-01-25 | End: 2021-08-23

## 2021-01-25 NOTE — TELEPHONE ENCOUNTER
Patients angela called and said that he had a virtual on Thursday and that his medication was suppose to be sent to the pharmacy and that they don't have them, and I don't see anything was sent to the pharamcy   Please advise      FLUoxetine (PROzac) 40 MG capsule [[id number]]     Order Details  Dose: 40 mg Route: Oral Frequency: Daily  Dispense Quantity: 30 capsule Refills: 5 Fills remaining: --         Sig: Take 1 capsule (40 mg total) by mouth daily      Pharmacy is [facility]

## 2021-02-22 ENCOUNTER — TELEPHONE (OUTPATIENT)
Dept: SLEEP CENTER | Facility: CLINIC | Age: 26
End: 2021-02-22

## 2021-02-22 NOTE — TELEPHONE ENCOUNTER
2/22 Left voicemail to see if patient will still be able to make appointment due to weather  Offered to change to virtual or just reschedule appointment   AR

## 2021-03-01 ENCOUNTER — TELEMEDICINE (OUTPATIENT)
Dept: FAMILY MEDICINE CLINIC | Facility: CLINIC | Age: 26
End: 2021-03-01
Payer: COMMERCIAL

## 2021-03-01 VITALS — TEMPERATURE: 99.9 F

## 2021-03-01 DIAGNOSIS — B34.9 VIRAL INFECTION, UNSPECIFIED: ICD-10-CM

## 2021-03-01 DIAGNOSIS — B34.9 VIRAL INFECTION, UNSPECIFIED: Primary | ICD-10-CM

## 2021-03-01 LAB
FLUAV RNA RESP QL NAA+PROBE: NEGATIVE
FLUBV RNA RESP QL NAA+PROBE: NEGATIVE
RSV RNA RESP QL NAA+PROBE: NEGATIVE
SARS-COV-2 RNA RESP QL NAA+PROBE: NEGATIVE

## 2021-03-01 PROCEDURE — 0241U HB NFCT DS VIR RESP RNA 4 TRGT: CPT | Performed by: NURSE PRACTITIONER

## 2021-03-01 PROCEDURE — 99213 OFFICE O/P EST LOW 20 MIN: CPT | Performed by: NURSE PRACTITIONER

## 2021-03-01 NOTE — PROGRESS NOTES
Virtual Regular Visit      Assessment/Plan:    Problem List Items Addressed This Visit     None      Visit Diagnoses     Viral infection, unspecified    -  Primary    Relevant Orders    Multiplex COVID19, Influenza A/B, RSV PCR, [facility]               Reason for visit is   Chief Complaint   Patient presents with    Virtual Regular Visit        Encounter provider PRIMO Yanez    Provider located at [place] [address] [address] [facility] [address]      Recent Visits  No visits were found meeting these conditions  Showing recent visits within past 7 days and meeting all other requirements     Today's Visits  Date Type Provider Dept   03/01/21 Telemedicine PRIMO Yanez [facility]   Showing today's visits and meeting all other requirements     Future Appointments  No visits were found meeting these conditions  Showing future appointments within next 150 days and meeting all other requirements        The patient was identified by name and date of birth  Aravind Landers was informed that this is a telemedicine visit and that the visit is being conducted through [facility] [place] and patient was informed that this is not a secure, HIPAA-compliant platform  He agrees to proceed     My office door was closed  No one else was in the room  He acknowledged consent and understanding of privacy and security of the video platform  The patient has agreed to participate and understands they can discontinue the visit at any time  Patient is aware this is a billable service  Subjective  Aravind April is a 32 y o  male          Started to get sick last night around 3:30am  Migraine,photosensitive  No taste  Sore throat  Fatigue  Weak and dizzy  Works daily in the hospital         Past Medical History:   Diagnosis Date    Ankle fracture     right    Fall 4585-6007    "fell off of a roof"    Mild intermittent asthma        Past Surgical History:   Procedure Laterality Date    TOOTH EXTRACTION         Current Outpatient Medications   Medication Sig Dispense Refill    al mag oxide-diphenhydramine-lidocaine viscous (MAGIC MOUTHWASH) 1:1:1 suspension Swish and spit 10 mL every 4 (four) hours as needed for mouth pain or discomfort 90 mL 1    albuterol (VENTOLIN HFA) 90 mcg/act inhaler Inhale 2 puffs      FLUoxetine (PROzac) 40 MG capsule Take 1 capsule (40 mg total) by mouth daily 30 capsule 5    fluticasone (FLOVENT HFA) 44 mcg/act inhaler Inhale 2 puffs every 12 (twelve) hours      mometasone-formoterol (DULERA) 100-5 MCG/ACT inhaler Inhale 2 puffs 2 (two) times a day Rinse mouth after use  (Patient not taking: Reported on 12/14/2020) 1 Inhaler 1     No current facility-administered medications for this visit  No Known Allergies    Review of Systems   Constitutional: Positive for chills, fatigue and fever  HENT: Positive for congestion, postnasal drip, rhinorrhea, sinus pressure and sore throat  Negative for ear pain  Eyes: Positive for photophobia  Negative for visual disturbance  Respiratory: Positive for cough  Negative for shortness of breath and wheezing  Cardiovascular: Negative for chest pain  Gastrointestinal: Negative for abdominal pain, constipation, diarrhea, nausea and vomiting  Musculoskeletal: Positive for arthralgias and myalgias  Skin: Negative for rash  Neurological: Positive for weakness, light-headedness and headaches  Negative for dizziness  Hematological: Negative for adenopathy  Psychiatric/Behavioral: Negative for dysphoric mood  The patient is not nervous/anxious  Video Exam    Vitals:    03/01/21 1353   Temp: 99 9 °F (37 7 °C)       Physical Exam  Constitutional:       Appearance: He is ill-appearing  HENT:      Head: Normocephalic and atraumatic     Eyes:      Conjunctiva/sclera: Conjunctivae normal    Pulmonary:      Effort: Pulmonary effort is normal    Musculoskeletal: Normal range of motion  Neurological:      Mental Status: He is alert and oriented to person, place, and time  Psychiatric:         Mood and Affect: Mood normal          Behavior: Behavior normal           I spent 15 minutes with patient today in which greater than 50% of the time was spent in counseling/coordination of care regarding viral illness      VIRTUAL VISIT DISCLAIMER    Andrew Lozano acknowledges that he has consented to an online visit or consultation  He understands that the online visit is based solely on information provided by him, and that, in the absence of a face-to-face physical evaluation by the physician, the diagnosis he receives is both limited and provisional in terms of accuracy and completeness  This is not intended to replace a full medical face-to-face evaluation by the physician  Andrew Lozano understands and accepts these terms

## 2021-03-24 ENCOUNTER — TELEMEDICINE (OUTPATIENT)
Dept: FAMILY MEDICINE CLINIC | Facility: CLINIC | Age: 26
End: 2021-03-24
Payer: COMMERCIAL

## 2021-03-24 DIAGNOSIS — J06.9 ACUTE UPPER RESPIRATORY INFECTION: Primary | ICD-10-CM

## 2021-03-24 PROCEDURE — 99213 OFFICE O/P EST LOW 20 MIN: CPT | Performed by: NURSE PRACTITIONER

## 2021-03-24 RX ORDER — AMOXICILLIN 875 MG/1
875 TABLET, COATED ORAL 2 TIMES DAILY
Qty: 20 TABLET | Refills: 0 | Status: SHIPPED | OUTPATIENT
Start: 2021-03-24 | End: 2021-04-03

## 2021-03-24 RX ORDER — BROMPHENIRAMINE MALEATE, PSEUDOEPHEDRINE HYDROCHLORIDE, AND DEXTROMETHORPHAN HYDROBROMIDE 2; 30; 10 MG/5ML; MG/5ML; MG/5ML
5 SYRUP ORAL 4 TIMES DAILY PRN
Qty: 120 ML | Refills: 0 | Status: SHIPPED | OUTPATIENT
Start: 2021-03-24 | End: 2021-04-08

## 2021-03-24 NOTE — PROGRESS NOTES
Virtual Regular Visit      Assessment/Plan:    Problem List Items Addressed This Visit     None      Visit Diagnoses     Acute upper respiratory infection    -  Primary    Relevant Medications    brompheniramine-pseudoephedrine-DM 30-2-10 MG/5ML syrup    amoxicillin (AMOXIL) 875 mg tablet               Reason for visit is   Chief Complaint   Patient presents with    Virtual Regular Visit        Encounter provider PRIMO Shore    Provider located at [address] [address]      Recent Visits  Date Type Provider Dept   03/24/21 2493 PRIMO Carlson [facility]   Showing recent visits within past 7 days and meeting all other requirements     Future Appointments  No visits were found meeting these conditions  Showing future appointments within next 150 days and meeting all other requirements        The patient was identified by name and date of birth  Will Sahni was informed that this is a telemedicine visit and that the visit is being conducted through [address] [place] [address] and patient was informed that this is not a secure, HIPAA-compliant platform  He agrees to proceed     My office door was closed  No one else was in the room  He acknowledged consent and understanding of privacy and security of the video platform  The patient has agreed to participate and understands they can discontinue the visit at any time  Patient is aware this is a billable service  Subjective  Will Sahni is a 32 y o  male          Cold symptoms  Started few days ago  No fever  Cough, sore throat  Congestion  Ha  No n/v/d         Past Medical History:   Diagnosis Date    Ankle fracture     right    Fall 4989-5724    "fell off of a roof"    Mild intermittent asthma        Past Surgical History:   Procedure Laterality Date    TOOTH EXTRACTION         Current Outpatient Medications   Medication Sig Dispense Refill    al [employer] oxide-diphenhydramine-lidocaine viscous (MAGIC MOUTHWASH) 1:1:1 suspension Swish and spit 10 mL every 4 (four) hours as needed for mouth pain or discomfort 90 mL 1    albuterol (VENTOLIN HFA) 90 mcg/act inhaler Inhale 2 puffs      amoxicillin (AMOXIL) 875 mg tablet Take 1 tablet (875 mg total) by mouth 2 (two) times a day for 10 days 20 tablet 0    brompheniramine-pseudoephedrine-DM 30-2-10 MG/5ML syrup Take 5 mL by mouth 4 (four) times a day as needed for cough 120 mL 0    FLUoxetine (PROzac) 40 MG capsule Take 1 capsule (40 mg total) by mouth daily 30 capsule 5    fluticasone (FLOVENT HFA) 44 mcg/act inhaler Inhale 2 puffs every 12 (twelve) hours      mometasone-formoterol (DULERA) 100-5 MCG/ACT inhaler Inhale 2 puffs 2 (two) times a day Rinse mouth after use  (Patient not taking: Reported on 12/14/2020) 1 Inhaler 1     No current facility-administered medications for this visit  No Known Allergies    Review of Systems   Constitutional: Positive for fatigue  HENT: Positive for congestion, postnasal drip, rhinorrhea and sore throat  Negative for ear pain  Respiratory: Positive for cough  Negative for shortness of breath and wheezing  Cardiovascular: Negative for chest pain and palpitations  Gastrointestinal: Negative for constipation and diarrhea  Genitourinary: Negative for dysuria and frequency  Musculoskeletal: Negative for arthralgias and myalgias  Skin: Negative for rash  Neurological: Negative for dizziness and light-headedness  Psychiatric/Behavioral: Negative for dysphoric mood and sleep disturbance  The patient is not nervous/anxious  Video Exam    There were no vitals filed for this visit  Physical Exam  Constitutional:       Appearance: Normal appearance  He is obese  HENT:      Head: Normocephalic and atraumatic  Eyes:      Conjunctiva/sclera: Conjunctivae normal    Neck:      Musculoskeletal: Normal range of motion     Pulmonary:      Effort: Pulmonary effort is normal    Musculoskeletal: Normal range of motion  Neurological:      Mental Status: He is alert and oriented to person, place, and time  Psychiatric:         Mood and Affect: Mood normal           I spent 15 minutes with patient today in which greater than 50% of the time was spent in counseling/coordination of care regarding uri      VIRTUAL VISIT DISCLAIMER    Cheyanne Beckman acknowledges that he has consented to an online visit or consultation  He understands that the online visit is based solely on information provided by him, and that, in the absence of a face-to-face physical evaluation by the physician, the diagnosis he receives is both limited and provisional in terms of accuracy and completeness  This is not intended to replace a full medical face-to-face evaluation by the physician  Cheyanne Beckman understands and accepts these terms

## 2021-04-08 ENCOUNTER — TELEMEDICINE (OUTPATIENT)
Dept: FAMILY MEDICINE CLINIC | Facility: CLINIC | Age: 26
End: 2021-04-08
Payer: COMMERCIAL

## 2021-04-08 VITALS — BODY MASS INDEX: 45.1 KG/M2 | HEIGHT: 70 IN | WEIGHT: 315 LBS

## 2021-04-08 DIAGNOSIS — R51.9 NONINTRACTABLE HEADACHE, UNSPECIFIED CHRONICITY PATTERN, UNSPECIFIED HEADACHE TYPE: Primary | ICD-10-CM

## 2021-04-08 PROCEDURE — 99213 OFFICE O/P EST LOW 20 MIN: CPT | Performed by: NURSE PRACTITIONER

## 2021-04-08 RX ORDER — KETOROLAC TROMETHAMINE 10 MG/1
10 TABLET, FILM COATED ORAL EVERY 8 HOURS PRN
Qty: 30 TABLET | Refills: 0 | Status: SHIPPED | OUTPATIENT
Start: 2021-04-08

## 2021-04-08 RX ORDER — TOPIRAMATE 50 MG/1
50 TABLET, FILM COATED ORAL DAILY
Qty: 30 TABLET | Refills: 3 | Status: SHIPPED | OUTPATIENT
Start: 2021-04-08 | End: 2021-04-21 | Stop reason: SDUPTHER

## 2021-04-08 NOTE — PROGRESS NOTES
Virtual Regular Visit      Assessment/Plan:    Problem List Items Addressed This Visit        Other    Nonintractable headache - Primary    Relevant Medications    topiramate (TOPAMAX) 50 MG tablet    ketorolac (TORADOL) 10 mg tablet               Reason for visit is   Chief Complaint   Patient presents with    Virtual Regular Visit        Encounter provider PRIMO Pacheco    Provider located at [address] [address]      Recent Visits  No visits were found meeting these conditions  Showing recent visits within past 7 days and meeting all other requirements     Today's Visits  Date Type Provider Dept   04/08/21 Telemedicine PRIMO Pacheco  [facility]   Showing today's visits and meeting all other requirements     Future Appointments  No visits were found meeting these conditions  Showing future appointments within next 150 days and meeting all other requirements        The patient was identified by name and date of birth  Zaire Ya was informed that this is a telemedicine visit and that the visit is being conducted through [address] [place] [address] and patient was informed that this is not a secure, HIPAA-compliant platform  He agrees to proceed     My office door was closed  No one else was in the room  He acknowledged consent and understanding of privacy and security of the video platform  The patient has agreed to participate and understands they can discontinue the visit at any time  Patient is aware this is a billable service  Subjective  Zaire Ya is a 32 y o  male          Having headaches  Awakening with migraines  Start the days with headaches  Still needs to get cpap machine  Can get ha on left side of head  Light and sound bothers him  Can get nausea with headache  Gets ha once or twice a day  Will take ibuprofen or tylenol with no relief         Past Medical History:   Diagnosis Date    Ankle fracture     right    Fall 2014-2015    "fell off of a roof"    Mild intermittent asthma        Past Surgical History:   Procedure Laterality Date    TOOTH EXTRACTION         Current Outpatient Medications   Medication Sig Dispense Refill    albuterol (VENTOLIN HFA) 90 mcg/act inhaler Inhale 2 puffs      FLUoxetine (PROzac) 40 MG capsule Take 1 capsule (40 mg total) by mouth daily 30 capsule 5    fluticasone (FLOVENT HFA) 44 mcg/act inhaler Inhale 2 puffs every 12 (twelve) hours      ketorolac (TORADOL) 10 mg tablet Take 1 tablet (10 mg total) by mouth every 8 (eight) hours as needed for moderate pain (migraine/headache) 30 tablet 0    topiramate (TOPAMAX) 50 MG tablet Take 1 tablet (50 mg total) by mouth daily 30 tablet 3     No current facility-administered medications for this visit  No Known Allergies    Review of Systems   Constitutional: Negative for fatigue and fever  Eyes: Positive for photophobia  Negative for visual disturbance  Respiratory: Negative for cough and shortness of breath  Cardiovascular: Negative for chest pain and palpitations  Gastrointestinal: Positive for nausea  Musculoskeletal: Negative for arthralgias and myalgias  Skin: Negative for rash  Neurological: Positive for headaches  Negative for dizziness and light-headedness  Hematological: Negative for adenopathy  Psychiatric/Behavioral: Negative for dysphoric mood and sleep disturbance  The patient is not nervous/anxious  Video Exam    Vitals:    04/08/21 1037   Weight: (!) 183 kg (403 lb)   Height: 5' 10" (1 778 m)       Physical Exam  Constitutional:       Appearance: Normal appearance  HENT:      Head: Normocephalic and atraumatic  Eyes:      Conjunctiva/sclera: Conjunctivae normal    Neck:      Musculoskeletal: Normal range of motion  Cardiovascular:      Rate and Rhythm: Normal rate  Musculoskeletal: Normal range of motion     Neurological:      Mental Status: He is alert and oriented to person, place, and time    Psychiatric:         Mood and Affect: Mood normal          Behavior: Behavior normal           I spent 15 minutes with patient today in which greater than 50% of the time was spent in counseling/coordination of care regarding headache    BMI Counseling: Body mass index is 57 82 kg/m²  The BMI is above normal  Nutrition recommendations include reducing portion sizes, decreasing overall calorie intake, 3-5 servings of fruits/vegetables daily, reducing fast food intake, consuming healthier snacks, decreasing soda and/or juice intake, moderation in carbohydrate intake, increasing intake of lean protein, reducing intake of saturated fat and trans fat and reducing intake of cholesterol  Exercise recommendations include moderate aerobic physical activity for 150 minutes/week, exercising 3-5 times per week and strength training exercises  VIRTUAL VISIT DISCLAIMER    David Cruz acknowledges that he has consented to an online visit or consultation  He understands that the online visit is based solely on information provided by him, and that, in the absence of a face-to-face physical evaluation by the physician, the diagnosis he receives is both limited and provisional in terms of accuracy and completeness  This is not intended to replace a full medical face-to-face evaluation by the physician  David Cruz understands and accepts these terms

## 2021-04-12 ENCOUNTER — TELEPHONE (OUTPATIENT)
Dept: FAMILY MEDICINE CLINIC | Facility: CLINIC | Age: 26
End: 2021-04-12

## 2021-04-12 DIAGNOSIS — Z20.822 EXPOSURE TO COVID-19 VIRUS: Primary | ICD-10-CM

## 2021-04-12 LAB — SARS-COV-2 RNA RESP QL NAA+PROBE: NEGATIVE

## 2021-04-12 PROCEDURE — U0005 INFEC AGEN DETEC AMPLI PROBE: HCPCS | Performed by: NURSE PRACTITIONER

## 2021-04-12 PROCEDURE — U0003 INFECTIOUS AGENT DETECTION BY NUCLEIC ACID (DNA OR RNA); SEVERE ACUTE RESPIRATORY SYNDROME CORONAVIRUS 2 (SARS-COV-2) (CORONAVIRUS DISEASE [COVID-19]), AMPLIFIED PROBE TECHNIQUE, MAKING USE OF HIGH THROUGHPUT TECHNOLOGIES AS DESCRIBED BY CMS-2020-01-R: HCPCS | Performed by: NURSE PRACTITIONER

## 2021-04-12 NOTE — TELEPHONE ENCOUNTER
Pt mom called he was exposed on 04/09/21 to his nephew inside house/no masks/within 6 ft/more then 15 min/no symptoms pt would like to be tested pls advise

## 2021-04-21 ENCOUNTER — TELEPHONE (OUTPATIENT)
Dept: FAMILY MEDICINE CLINIC | Facility: CLINIC | Age: 26
End: 2021-04-21

## 2021-04-21 DIAGNOSIS — G47.33 OBSTRUCTIVE SLEEP APNEA (ADULT) (PEDIATRIC): Primary | ICD-10-CM

## 2021-04-21 DIAGNOSIS — R51.9 NONINTRACTABLE HEADACHE, UNSPECIFIED CHRONICITY PATTERN, UNSPECIFIED HEADACHE TYPE: ICD-10-CM

## 2021-04-21 DIAGNOSIS — R51.9 CHRONIC NONINTRACTABLE HEADACHE, UNSPECIFIED HEADACHE TYPE: ICD-10-CM

## 2021-04-21 DIAGNOSIS — G89.29 CHRONIC NONINTRACTABLE HEADACHE, UNSPECIFIED HEADACHE TYPE: ICD-10-CM

## 2021-04-21 DIAGNOSIS — E66.01 MORBID OBESITY (HCC): ICD-10-CM

## 2021-04-21 RX ORDER — TOPIRAMATE 50 MG/1
50 TABLET, FILM COATED ORAL 2 TIMES DAILY
Qty: 60 TABLET | Refills: 3 | Status: SHIPPED | OUTPATIENT
Start: 2021-04-21

## 2021-04-21 NOTE — TELEPHONE ENCOUNTER
Pam Galvez is advising pt to try taking 2 of the topamax daily new script sent over and mri order is in pt's chart  Flomio and advised of this

## 2021-04-21 NOTE — TELEPHONE ENCOUNTER
Pt gf called you saw him two weeks ago for headaches and he is taking the med you gave him but it got worse and he can not get out of bed or sit up or go to work for days     Pt gf asking if he can get an ct or mri to see if something else is going on      pls advise

## 2021-04-29 ENCOUNTER — HOSPITAL ENCOUNTER (EMERGENCY)
Facility: HOSPITAL | Age: 26
Discharge: HOME | End: 2021-04-29
Attending: EMERGENCY MEDICINE | Admitting: EMERGENCY MEDICINE

## 2021-04-29 VITALS
DIASTOLIC BLOOD PRESSURE: 84 MMHG | OXYGEN SATURATION: 98 % | HEART RATE: 100 BPM | RESPIRATION RATE: 22 BRPM | TEMPERATURE: 98 F | SYSTOLIC BLOOD PRESSURE: 132 MMHG

## 2021-04-29 DIAGNOSIS — M54.2 NECK PAIN: ICD-10-CM

## 2021-04-29 DIAGNOSIS — R59.1 LYMPHADENOPATHY: Primary | ICD-10-CM

## 2021-04-29 PROCEDURE — 86308 HETEROPHILE ANTIBODY SCREEN: CPT | Performed by: PHYSICIAN ASSISTANT

## 2021-04-29 PROCEDURE — 99284 EMERGENCY DEPT VISIT MOD MDM: CPT | Performed by: PHYSICIAN ASSISTANT

## 2021-04-29 PROCEDURE — 36415 COLL VENOUS BLD VENIPUNCTURE: CPT | Performed by: PHYSICIAN ASSISTANT

## 2021-04-29 PROCEDURE — 99283 EMERGENCY DEPT VISIT LOW MDM: CPT

## 2021-04-29 RX ORDER — METHOCARBAMOL 500 MG/1
500 TABLET, FILM COATED ORAL 2 TIMES DAILY
Qty: 20 TABLET | Refills: 0 | Status: SHIPPED | OUTPATIENT
Start: 2021-04-29 | End: 2021-07-20

## 2021-04-29 NOTE — ED PROVIDER NOTES
History  Chief Complaint   Patient presents with    Mass     pt reports lump on left side back of neck since last week     The patient is a 79-year-old male with no significant past medical history who presents to the emergency department for evaluation of a lump that he found a week ago on the left side of the back of his neck  He states the area is tender to touch  He states just prior to finding the lump, he had had a migraine the day prior  He does report a history of migraines  He no longer reports any associated headache  The patient does state that he had significant neck injury 2 years ago after falling off a 2 story building  He has been getting treated for migraine since  He denies any recent injury  He states he is here because his girlfriend wanted him to come be evaluated  He denies fever, chills, sore throat, ear pain, current headache, nausea, vomiting, dizziness or lightheadedness  History provided by:  Patient   used: No        Prior to Admission Medications   Prescriptions Last Dose Informant Patient Reported? Taking?    FLUoxetine (PROzac) 40 MG capsule   No No   Sig: Take 1 capsule (40 mg total) by mouth daily   albuterol (VENTOLIN HFA) 90 mcg/act inhaler   Yes No   Sig: Inhale 2 puffs   fluticasone (FLOVENT HFA) 44 mcg/act inhaler   Yes No   Sig: Inhale 2 puffs every 12 (twelve) hours   ketorolac (TORADOL) 10 mg tablet   No No   Sig: Take 1 tablet (10 mg total) by mouth every 8 (eight) hours as needed for moderate pain (migraine/headache)   topiramate (TOPAMAX) 50 MG tablet   No No   Sig: Take 1 tablet (50 mg total) by mouth 2 (two) times a day      Facility-Administered Medications: None       Past Medical History:   Diagnosis Date    Ankle fracture     right    Fall 9228-1954    "fell off of a roof"    Mild intermittent asthma        Past Surgical History:   Procedure Laterality Date    TOOTH EXTRACTION         Family History   Problem Relation Age of Onset    Diabetes Mother     Breast cancer Mother     Diabetes Father     Hypertension Father     Diabetes Sister     Diabetes Brother     Hypertension Brother     Hypertension Maternal Grandfather     Diabetes Maternal Grandfather     Thyroid disease Maternal Grandfather     Diabetes Paternal Grandmother     Alzheimer's disease Paternal Grandfather     Thyroid disease Maternal Uncle     Alzheimer's disease Paternal Aunt      I have reviewed and agree with the history as documented  E-Cigarette/Vaping    E-Cigarette Use Never User      E-Cigarette/Vaping Substances     Social History     Tobacco Use    Smoking status: Former Smoker     Types: Cigarettes    Smokeless tobacco: Former User   Substance Use Topics    Alcohol use: Yes     Comment: socially    Drug use: No       Review of Systems   Constitutional: Negative for chills and fever  HENT: Negative for ear pain and sore throat  Eyes: Negative for redness and visual disturbance  Respiratory: Negative for cough, shortness of breath and wheezing  Cardiovascular: Negative for chest pain  Gastrointestinal: Negative for abdominal pain, diarrhea, nausea and vomiting  Genitourinary: Negative for dysuria and hematuria  Musculoskeletal: Positive for neck pain  Negative for back pain and neck stiffness  Skin: Negative for color change and rash  Neurological: Negative for dizziness, light-headedness and headaches  Hematological: Positive for adenopathy  All other systems reviewed and are negative  Physical Exam  Physical Exam  Constitutional:       Appearance: Normal appearance  HENT:      Head: Normocephalic and atraumatic  Nose: Nose normal    Eyes:      Extraocular Movements: Extraocular movements intact  Conjunctiva/sclera: Conjunctivae normal    Pulmonary:      Effort: Pulmonary effort is normal  No respiratory distress  Musculoskeletal: Normal range of motion     Lymphadenopathy:      Cervical: Cervical adenopathy present  Left cervical: Posterior cervical adenopathy present  Skin:     General: Skin is warm and dry  Neurological:      General: No focal deficit present  Mental Status: He is alert and oriented to person, place, and time  Vital Signs  ED Triage Vitals [04/29/21 1416]   Temperature Pulse Respirations Blood Pressure SpO2   98 °F (36 7 °C) 100 22 132/84 98 %      Temp src Heart Rate Source Patient Position - Orthostatic VS BP Location FiO2 (%)   -- -- Sitting Right arm --      Pain Score       No Pain           Vitals:    04/29/21 1416   BP: 132/84   Pulse: 100   Patient Position - Orthostatic VS: Sitting         Visual Acuity      ED Medications  Medications - No data to display    Diagnostic Studies  Results Reviewed     Procedure Component Value Units Date/Time    Mononucleosis screen [[id number]] Collected: 04/29/21 1556    Lab Status: In process Specimen: Blood from Arm, Right Updated: 04/29/21 1558                 No orders to display              Procedures  Procedures         ED Course                                           MDM  Number of Diagnoses or Management Options  Lymphadenopathy: new and requires workup  Neck pain: new and requires workup  Diagnosis management comments: Patient presents for evaluation lump to left posterior been there for week  On exam, I feel that the swelling is likely posterior cervical lymphadenopathy  However, muscle spasm remains on the differential at this time  Monospot was sent on patient  I will start the patient on a muscle relaxer, and I advised him to continue to check the area of swelling  I advised if he continues to have a lump after 1 more week, he needs to follow up with his primary care doctor for further evaluation at that time  He acknowledged understanding  I advised the patient use warm compresses and take Tylenol or Motrin as needed for pain    I advised to return to the emergency department with any new or significantly worsening symptoms  Patient is stable for discharge  Amount and/or Complexity of Data Reviewed  Clinical lab tests: ordered  Decide to obtain previous medical records or to obtain history from someone other than the patient: yes  Review and summarize past medical records: yes    Risk of Complications, Morbidity, and/or Mortality  Presenting problems: minimal  Diagnostic procedures: minimal  Management options: minimal    Patient Progress  Patient progress: stable      Disposition  Final diagnoses:   Lymphadenopathy   Neck pain     Time reflects when diagnosis was documented in both MDM as applicable and the Disposition within this note     Time User Action Codes Description Comment    4/29/2021  4:07 PM Jose Grace Add [R59 1] Lymphadenopathy     4/29/2021  4:07 PM Jose Grace Add [M54 2] Neck pain       ED Disposition     ED Disposition Condition Date/Time Comment    Discharge Stable Thu Apr 29, 2021  4:07 PM Xochilt Stokes discharge to home/self care              Follow-up Information     Follow up With Specialties Details Why Contact Info Additional Information    [place], [facility], Internal Medicine, Nurse Practitioner Schedule an appointment as soon as possible for a visit in 1 week  [address] [place] [address] [phone]       [facility] Emergency Medicine  If symptoms worsen [address] [facility] [id number] [place] [address] Emergency Department, [address], [place], [place], [address]          Discharge Medication List as of 4/29/2021  4:08 PM      START taking these medications    Details   methocarbamol (ROBAXIN) 500 mg tablet Take 1 tablet (500 mg total) by mouth 2 (two) times a day, Starting Thu 4/29/2021, Normal         CONTINUE these medications which have NOT CHANGED    Details   albuterol (VENTOLIN HFA) 90 mcg/act inhaler Inhale 2 puffs, Historical Med      FLUoxetine (PROzac) 40 MG capsule Take 1 capsule (40 mg total) by mouth daily, Starting Mon 1/25/2021, Normal      fluticasone (FLOVENT HFA) 44 mcg/act inhaler Inhale 2 puffs every 12 (twelve) hours, Starting Wed 11/13/2013, Historical Med      ketorolac (TORADOL) 10 mg tablet Take 1 tablet (10 mg total) by mouth every 8 (eight) hours as needed for moderate pain (migraine/headache), Starting u 4/8/2021, Normal      topiramate (TOPAMAX) 50 MG tablet Take 1 tablet (50 mg total) by mouth 2 (two) times a day, Starting Wed 4/21/2021, Normal           No discharge procedures on file      [address] Review     None          ED Provider  Electronically Signed by           Jhony Rivera PA-C  04/29/21 2013

## 2021-04-30 LAB — HETEROPH AB SER QL: NEGATIVE

## 2021-05-25 ENCOUNTER — TELEMEDICINE (OUTPATIENT)
Dept: FAMILY MEDICINE CLINIC | Facility: CLINIC | Age: 26
End: 2021-05-25

## 2021-05-25 ENCOUNTER — TELEPHONE (OUTPATIENT)
Dept: FAMILY MEDICINE CLINIC | Facility: CLINIC | Age: 26
End: 2021-05-25

## 2021-05-25 DIAGNOSIS — M54.50 ACUTE BILATERAL LOW BACK PAIN WITHOUT SCIATICA: Primary | ICD-10-CM

## 2021-05-25 PROBLEM — E66.01 CLASS 3 SEVERE OBESITY DUE TO EXCESS CALORIES WITHOUT SERIOUS COMORBIDITY WITH BODY MASS INDEX (BMI) OF 45.0 TO 49.9 IN ADULT (HCC): Status: RESOLVED | Noted: 2020-04-28 | Resolved: 2021-05-25

## 2021-05-25 PROBLEM — E66.813 CLASS 3 SEVERE OBESITY DUE TO EXCESS CALORIES WITHOUT SERIOUS COMORBIDITY WITH BODY MASS INDEX (BMI) OF 45.0 TO 49.9 IN ADULT (HCC): Status: RESOLVED | Noted: 2020-04-28 | Resolved: 2021-05-25

## 2021-05-25 PROCEDURE — 99213 OFFICE O/P EST LOW 20 MIN: CPT | Performed by: NURSE PRACTITIONER

## 2021-05-25 RX ORDER — CYCLOBENZAPRINE HCL 10 MG
10 TABLET ORAL 3 TIMES DAILY PRN
Qty: 30 TABLET | Refills: 0 | Status: SHIPPED | OUTPATIENT
Start: 2021-05-25 | End: 2021-07-20 | Stop reason: SDUPTHER

## 2021-05-25 RX ORDER — PREDNISONE 10 MG/1
50 TABLET ORAL DAILY
Qty: 25 TABLET | Refills: 0 | Status: SHIPPED | OUTPATIENT
Start: 2021-05-25 | End: 2021-05-30

## 2021-05-25 NOTE — PROGRESS NOTES
Virtual Regular Visit      Assessment/Plan:    Problem List Items Addressed This Visit        Other    Acute bilateral low back pain without sciatica - Primary    Relevant Medications    predniSONE 10 mg tablet    cyclobenzaprine (FLEXERIL) 10 mg tablet               Reason for visit is   Chief Complaint   Patient presents with    Virtual Regular Visit        Encounter provider PRIMO Fisher    Provider located at [address] [address]      Recent Visits  No visits were found meeting these conditions  Showing recent visits within past 7 days and meeting all other requirements     Today's Visits  Date Type Provider Dept   05/25/21 Telemedicine [facility]IMO [facility]   Showing today's visits and meeting all other requirements     Future Appointments  No visits were found meeting these conditions  Showing future appointments within next 150 days and meeting all other requirements        The patient was identified by name and date of birth  Broderick Course was informed that this is a telemedicine visit and that the visit is being conducted through [facility] and patient was informed that this is a secure, HIPAA-compliant platform  He agrees to proceed     My office door was closed  No one else was in the room  He acknowledged consent and understanding of privacy and security of the video platform  The patient has agreed to participate and understands they can discontinue the visit at any time  Patient is aware this is a billable service  Subjective  Broderick Course is a 32 y o  male          Lifting air conditioners, hurt low back  Trouble standing, rolling, trouble getting out of bed  Using heat pack and ice to low back  No loss of bowel or bladder  Happened two days ago         Past Medical History:   Diagnosis Date    Ankle fracture     right    Fall 6737-1114    "fell off of a roof"    Mild intermittent asthma Past Surgical History:   Procedure Laterality Date    TOOTH EXTRACTION         Current Outpatient Medications   Medication Sig Dispense Refill    albuterol (VENTOLIN HFA) 90 mcg/act inhaler Inhale 2 puffs      cyclobenzaprine (FLEXERIL) 10 mg tablet Take 1 tablet (10 mg total) by mouth 3 (three) times a day as needed for muscle spasms 30 tablet 0    FLUoxetine (PROzac) 40 MG capsule Take 1 capsule (40 mg total) by mouth daily 30 capsule 5    fluticasone (FLOVENT HFA) 44 mcg/act inhaler Inhale 2 puffs every 12 (twelve) hours      ketorolac (TORADOL) 10 mg tablet Take 1 tablet (10 mg total) by mouth every 8 (eight) hours as needed for moderate pain (migraine/headache) 30 tablet 0    methocarbamol (ROBAXIN) 500 mg tablet Take 1 tablet (500 mg total) by mouth 2 (two) times a day 20 tablet 0    predniSONE 10 mg tablet Take 5 tablets (50 mg total) by mouth daily for 5 days 25 tablet 0    topiramate (TOPAMAX) 50 MG tablet Take 1 tablet (50 mg total) by mouth 2 (two) times a day 60 tablet 3     No current facility-administered medications for this visit  No Known Allergies    Review of Systems   Constitutional: Negative for fatigue and fever  Respiratory: Negative for cough and shortness of breath  Cardiovascular: Negative for chest pain and palpitations  Gastrointestinal: Negative for constipation and diarrhea  Genitourinary: Negative for dysuria and frequency  Musculoskeletal: Positive for back pain  Skin: Negative for rash  Neurological: Negative for dizziness, light-headedness and headaches  Hematological: Negative for adenopathy  Psychiatric/Behavioral: Negative for dysphoric mood  The patient is not nervous/anxious  Video Exam    There were no vitals filed for this visit  Physical Exam  Constitutional:       Appearance: Normal appearance  HENT:      Head: Normocephalic and atraumatic     Eyes:      Conjunctiva/sclera: Conjunctivae normal    Neck: Musculoskeletal: Normal range of motion  Abdominal:      General: Bowel sounds are normal    Musculoskeletal: Normal range of motion  Neurological:      Mental Status: He is alert and oriented to person, place, and time  Psychiatric:         Mood and Affect: Mood normal          Behavior: Behavior normal           I spent back pain minutes with patient today in which greater than 50% of the time was spent in counseling/coordination of care regarding back pain      VIRTUAL VISIT DISCLAIMER    Syed Howard acknowledges that he has consented to an online visit or consultation  He understands that the online visit is based solely on information provided by him, and that, in the absence of a face-to-face physical evaluation by the physician, the diagnosis he receives is both limited and provisional in terms of accuracy and completeness  This is not intended to replace a full medical face-to-face evaluation by the physician  Syed Howard understands and accepts these terms

## 2021-05-25 NOTE — TELEPHONE ENCOUNTER
The steroid is th anti-inflammatory  The muscle relaxer was sent  Take tylenol for pain  When done with the steroid we will have him take advil

## 2021-05-27 ENCOUNTER — TELEPHONE (OUTPATIENT)
Dept: FAMILY MEDICINE CLINIC | Facility: CLINIC | Age: 26
End: 2021-05-27

## 2021-05-27 NOTE — TELEPHONE ENCOUNTER
Pt called re: Has been having severe back pain  Right leg went numb and had pins and needles for about 15 minutes yesterday and this morning  Requesting treatment options and asking if he should schedule another virtual appointment       Advised Pt, if it worsens or persist, go to the ER

## 2021-05-27 NOTE — TELEPHONE ENCOUNTER
Shruti Gonzales is aware and advised for pt to go to work and if pain is that bad he needs to go to ER for evaluation    Pt notified

## 2021-07-01 ENCOUNTER — OFFICE VISIT (OUTPATIENT)
Dept: SLEEP CENTER | Facility: CLINIC | Age: 26
End: 2021-07-01

## 2021-07-01 VITALS
HEIGHT: 70 IN | DIASTOLIC BLOOD PRESSURE: 80 MMHG | SYSTOLIC BLOOD PRESSURE: 120 MMHG | BODY MASS INDEX: 45.1 KG/M2 | WEIGHT: 315 LBS | HEART RATE: 104 BPM

## 2021-07-01 DIAGNOSIS — G47.33 OBSTRUCTIVE SLEEP APNEA (ADULT) (PEDIATRIC): Primary | ICD-10-CM

## 2021-07-01 PROCEDURE — 99213 OFFICE O/P EST LOW 20 MIN: CPT | Performed by: PSYCHIATRY & NEUROLOGY

## 2021-07-01 NOTE — PATIENT INSTRUCTIONS
Recommendations:    1) CPAP at 16cm with NM or FFM  2) Safe driving reviewed  3) Follow-up 2 months  4) Call with any questions or concerns

## 2021-07-01 NOTE — PROGRESS NOTES
Sleep Medicine Follow-Up Note    HPI: 33yo M with OTF being seen for a follow up  Treatment Summary: Split study done 2020: apnea/hypopnea index was 115 4 events per hour of sleep   The AHI in the supine position was 115  4   The AHI during REM sleep was 168 0  CPAP 16cm recommended  HPI:   Today, patient presents unaccompanied  He stated that he finally got the CPAP (took him a longtime) and then was using his FFM and then was uncomfortable  He now got a different FFM (under his nose) and is trying to use his CPAP  He is willing to start using it  Treatment: CPAP  -Pressure: 16cm   -Pressure intolerance: no   -Aerophagia: no   -Air Hunger: no  -Interface: Was unable to use with a FFM  -Fit: poor   -Chin strap: no  -HCC: [facility]  -Patient's perceived outcome: not using it       Compliance Card Data:    - Date Range: 21-21   - Settincm   - Residual AHI: 0   - Vibratory Snore Index: n/a   - %  Night in Large Leak: 12 3%   - Average usage days used: 1h 18m   - % Days used: 13%   - % Days with Usage > 4 hrs: 0%    Respiratory:  -Ongoing Snoring: yes  -Mouth Breathing: unsure  -Dry Mouth: no  -Nocturnal Gasping: no    ROS:   Genitourinary none   Cardiology none   Gastrointestinal none   Neurology frequent headaches, forgetfulness, poor concentration or confusion,  and difficulty with memory   Constitutional claustrophobia   Integumentary none   Psychiatry anxiety   Musculoskeletal none   Pulmonary shortness of breath with activity, snoring and difficulty breathing when lying flat    ENT none   Endocrine none   Hematological none       Sleep Pattern:  -Position: prone, back (but cannot breath)  -Bedtime: 8pm  -Lights out: same time  -Environmental: No lights/TV  -Latency: 30 mins  -Awakenings: 3              -Reason: void, wife, nightmare              -Duration: mins  -Wake time: 6am              -Alarm: yes  -Rise time: same time  -Days off: 8pm-8am  -Shift Work: - days  -Patient's estimate of total sleep time: unsure    Daytime Symptoms:  -Upon Awakening: alright  -Daytime fatigue/sleepiness: not tired  -Naps: no  -Involuntary Dozing: no  -Cognitive Symptoms: memory is an issue  -Driving: Difficulty with sleepiness and driving:  no   -- Close calls related to sleepiness: no   -- Accidents related to sleepiness: no    Substance Use:  -Caffeine: 1 [place] energy drink a day  -Alcohol: no  -THC: no    --> Denies any significant medical changes since last visit  --> Supplemental Oxygen Use: denies    Questionnaire:  Sitting and reading: Moderate chance of dozing  Watching TV: Slight chance of dozing  Sitting, inactive in a public place (e g  a theatre or a meeting): Would never doze  As a passenger in a car for an hour without a break: Moderate chance of dozing  Lying down to rest in the afternoon when circumstances permit: High chance of dozing  Sitting and talking to someone: Would never doze  Sitting quietly after a lunch without alcohol: Would never doze  In a car, while stopped for a few minutes in traffic: Slight chance of dozing  Total score: 9      PE:    /80   Pulse 104   Ht 5' 10" (1 778 m)   Wt (!) 188 kg (414 lb)   BMI 59 40 kg/m²     General:  In NAD  Pul: Respirations: unlaboured  MS: No atrophy  Neuro: No resting tremor  Gait normal turning & station; unremarkable overall  Psych: Socially appropriate  Pleasant  No overt dysphoria  Assessment: The patient received his CPAP later than expected and was given a FFM when a NM was ordered  He has not been able to use it due to his mask  He has very severe OTF and 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

## 2021-07-13 ENCOUNTER — HOSPITAL ENCOUNTER (EMERGENCY)
Facility: HOSPITAL | Age: 26
Discharge: HOME | End: 2021-07-13
Attending: EMERGENCY MEDICINE | Admitting: EMERGENCY MEDICINE

## 2021-07-13 ENCOUNTER — APPOINTMENT (EMERGENCY)
Dept: CT IMAGING | Facility: HOSPITAL | Age: 26
End: 2021-07-13

## 2021-07-13 VITALS
RESPIRATION RATE: 18 BRPM | HEART RATE: 89 BPM | HEIGHT: 71 IN | OXYGEN SATURATION: 97 % | TEMPERATURE: 98.4 F | WEIGHT: 315 LBS | DIASTOLIC BLOOD PRESSURE: 93 MMHG | SYSTOLIC BLOOD PRESSURE: 162 MMHG | BODY MASS INDEX: 44.1 KG/M2

## 2021-07-13 DIAGNOSIS — R22.1 NECK SWELLING: Primary | ICD-10-CM

## 2021-07-13 LAB
ALBUMIN SERPL BCP-MCNC: 4.2 G/DL (ref 3.5–5)
ALP SERPL-CCNC: 78 U/L (ref 46–116)
ALT SERPL W P-5'-P-CCNC: 62 U/L (ref 12–78)
ANION GAP SERPL CALCULATED.3IONS-SCNC: 8 MMOL/L (ref 4–13)
AST SERPL W P-5'-P-CCNC: 24 U/L (ref 5–45)
BASOPHILS # BLD AUTO: 0.02 THOUSANDS/ΜL (ref 0–0.1)
BASOPHILS NFR BLD AUTO: 0 % (ref 0–1)
BILIRUB SERPL-MCNC: 0.52 MG/DL (ref 0.2–1)
BUN SERPL-MCNC: 10 MG/DL (ref 5–25)
CALCIUM SERPL-MCNC: 9.3 MG/DL (ref 8.3–10.1)
CHLORIDE SERPL-SCNC: 102 MMOL/L (ref 100–108)
CO2 SERPL-SCNC: 30 MMOL/L (ref 21–32)
CREAT SERPL-MCNC: 0.76 MG/DL (ref 0.6–1.3)
EOSINOPHIL # BLD AUTO: 0.18 THOUSAND/ΜL (ref 0–0.61)
EOSINOPHIL NFR BLD AUTO: 3 % (ref 0–6)
ERYTHROCYTE [DISTWIDTH] IN BLOOD BY AUTOMATED COUNT: 13.3 % (ref 11.6–15.1)
GFR SERPL CREATININE-BSD FRML MDRD: 126 ML/MIN/1.73SQ M
GLUCOSE SERPL-MCNC: 89 MG/DL (ref 65–140)
HCT VFR BLD AUTO: 52.2 % (ref 36.5–49.3)
HGB BLD-MCNC: 16.4 G/DL (ref 12–17)
IMM GRANULOCYTES # BLD AUTO: 0.01 THOUSAND/UL (ref 0–0.2)
IMM GRANULOCYTES NFR BLD AUTO: 0 % (ref 0–2)
LYMPHOCYTES # BLD AUTO: 1.59 THOUSANDS/ΜL (ref 0.6–4.47)
LYMPHOCYTES NFR BLD AUTO: 26 % (ref 14–44)
MCH RBC QN AUTO: 29.2 PG (ref 26.8–34.3)
MCHC RBC AUTO-ENTMCNC: 31.4 G/DL (ref 31.4–37.4)
MCV RBC AUTO: 93 FL (ref 82–98)
MONOCYTES # BLD AUTO: 0.41 THOUSAND/ΜL (ref 0.17–1.22)
MONOCYTES NFR BLD AUTO: 7 % (ref 4–12)
NEUTROPHILS # BLD AUTO: 3.97 THOUSANDS/ΜL (ref 1.85–7.62)
NEUTS SEG NFR BLD AUTO: 64 % (ref 43–75)
NRBC BLD AUTO-RTO: 0 /100 WBCS
PLATELET # BLD AUTO: 224 THOUSANDS/UL (ref 149–390)
PMV BLD AUTO: 9.3 FL (ref 8.9–12.7)
POTASSIUM SERPL-SCNC: 4.2 MMOL/L (ref 3.5–5.3)
PROT SERPL-MCNC: 8.3 G/DL (ref 6.4–8.2)
RBC # BLD AUTO: 5.62 MILLION/UL (ref 3.88–5.62)
SODIUM SERPL-SCNC: 140 MMOL/L (ref 136–145)
WBC # BLD AUTO: 6.18 THOUSAND/UL (ref 4.31–10.16)

## 2021-07-13 PROCEDURE — 85025 COMPLETE CBC W/AUTO DIFF WBC: CPT | Performed by: PHYSICIAN ASSISTANT

## 2021-07-13 PROCEDURE — 80053 COMPREHEN METABOLIC PANEL: CPT | Performed by: PHYSICIAN ASSISTANT

## 2021-07-13 PROCEDURE — 99284 EMERGENCY DEPT VISIT MOD MDM: CPT | Performed by: PHYSICIAN ASSISTANT

## 2021-07-13 PROCEDURE — 36415 COLL VENOUS BLD VENIPUNCTURE: CPT | Performed by: PHYSICIAN ASSISTANT

## 2021-07-13 PROCEDURE — 99284 EMERGENCY DEPT VISIT MOD MDM: CPT

## 2021-07-13 PROCEDURE — G1004 CDSM NDSC: HCPCS

## 2021-07-13 PROCEDURE — 70491 CT SOFT TISSUE NECK W/DYE: CPT

## 2021-07-13 PROCEDURE — 70450 CT HEAD/BRAIN W/O DYE: CPT

## 2021-07-13 RX ORDER — AMOXICILLIN AND CLAVULANATE POTASSIUM 875; 125 MG/1; MG/1
1 TABLET, FILM COATED ORAL EVERY 12 HOURS SCHEDULED
Qty: 14 TABLET | Refills: 0 | Status: SHIPPED | OUTPATIENT
Start: 2021-07-13 | End: 2021-07-20

## 2021-07-13 RX ADMIN — IOHEXOL 100 ML: 350 INJECTION, SOLUTION INTRAVENOUS at 10:51

## 2021-07-13 NOTE — DISCHARGE INSTRUCTIONS
Your CT Today was concerning for possible adenitis (lymph node inflammation) or cellulitis (skin infection)   Based on your symptoms progressing since April - I would recommend that you follow up with ENT  You may require a biopsy of this site        Adenitis   WHAT YOU NEED TO KNOW:   Adenitis is a condition that causes your lymph nodes to become swollen and tender You may also have a fever  Adenitis is a sign of infection usually caused by bacteria  DISCHARGE INSTRUCTIONS:   Call your local emergency number (911 in the [address] [place] [address],[address]) if:   You have trouble breathing or swallowing  Return to the emergency department if:   You have new or worsening redness or swelling  You develop a large, soft bump that may leak pus  Call your doctor if:   Your symptoms do not improve after 10 days of treatment  You have questions or concerns about your condition or care  Medicines: You may  need any of the following:  Antibiotics  help treat a bacterial infection  Acetaminophen  decreases pain and fever  It is available without a doctor's order  Ask how much to take and how often to take it  Follow directions  Read the labels of all other medicines you are using to see if they also contain acetaminophen, or ask your doctor or pharmacist  Acetaminophen can cause liver damage if not taken correctly  Do not use more than 4 grams (4,000 milligrams) total of acetaminophen in one day  NSAIDs , such as ibuprofen, help decrease swelling, pain, and fever  NSAIDs can cause stomach bleeding or kidney problems in certain people  If you take blood thinner medicine, always ask your healthcare provider if NSAIDs are safe for you  Always read the medicine label and follow directions  Take your medicine as directed  Contact your healthcare provider if you think your medicine is not helping or if you have side effects  Tell him of her if you are allergic to any medicine  Keep a list of the medicines, vitamins, and herbs you take  Include the amounts, and when and why you take them  Bring the list or the pill bottles to follow-up visits  Carry your medicine list with you in case of an emergency  Manage your symptoms:   Apply moist heat  on your swollen lymph nodes for 20 to 30 minutes every 2 hours or as directed  Heat helps decrease pain and swelling  You can make a moist heat pack by soaking a small towel in hot water  Let it cool until you can hold it with your bare hands  Then wring out the extra water  Place the towel in a plastic bag, and wrap the bag with a dry towel around the bag  Place the pack over your swollen lymph nodes  Elevate your head and upper back  Keep your head and upper back elevated when you rest, such as in a recliner  Place extra pillows under your head and neck when you sleep in bed  Elevation helps decrease swelling  Prevent an infection:       Wash your hands often  Wash your hands several times each day  Wash after you use the bathroom, change a child's diaper, and before you prepare or eat food  Use soap and water every time  Rub your soapy hands together, lacing your fingers  Wash the front and back of your hands, and in between your fingers  Use the fingers of one hand to scrub under the fingernails of the other hand  Wash for at least 20 seconds  Rinse with warm, running water for several seconds  Then dry your hands with a clean towel or paper towel  Use hand  that contains alcohol if soap and water are not available  Do not touch your eyes, nose, or mouth without washing your hands first          Cover a sneeze or cough  Use a tissue that covers your mouth and nose  Throw the tissue away in a trash can right away  Use the bend of your arm if a tissue is not available  Wash your hands well with soap and water or use a hand   Clean surfaces often  Clean doorknobs, countertops, cell phones, and other surfaces that are touched often   Use a disinfecting wipe, a single-use sponge, or a cloth you can wash and reuse  Use disinfecting  if you do not have wipes  You can create a disinfecting  by mixing 1 part bleach with 10 parts water  Ask about vaccines you may need  Vaccines help prevent diseases caused by some viruses and bacteria  Get the influenza (flu) vaccine as soon as recommended each year  The flu vaccine is usually available starting in September or October  Flu viruses change, so it is important to get a flu vaccine every year  Get the pneumonia vaccine if recommended  This vaccine is usually recommended every 5 years  Your provider will tell you when to get this vaccine, if needed  He or she can tell you if you should get other vaccines, and when to get them  Follow up with your doctor within 2 days: You may be referred to a dentist or need more tests  Write down your questions so you remember to ask them during your visits  © Copyright [address] [place] [address] Drive Information is for End User's use only and may not be sold, redistributed or otherwise used for commercial purposes  All illustrations and images included in CareNotes® are the copyrighted property of A D A M , Inc  or [facility] Alexei Self   The above information is an  only  It is not intended as medical advice for individual conditions or treatments  Talk to your doctor, nurse or pharmacist before following any medical regimen to see if it is safe and effective for you

## 2021-07-13 NOTE — ED PROVIDER NOTES
History  Chief Complaint   Patient presents with    Medical Problem     pt reports two swollen areas on posterior neck that cause him "pain and difficulty turning his head"  unable to wait for outpatient ct, told to come to ed by pcp for ct  Mica Hogan is a 32 y o  male who presents to the ED with complaints of neck swelling  Patient states he initially noticed left-sided neck swelling approximately 2 months ago which has "grown in size " Patient states he has intermittent stabbing pain and difficulty moving his head to the left  Patient states he is now noticing some swelling on the right side of his neck  Patient was seen by his PCP has scheduled an outpatient CT however patient is concerned due to lack of insurance  Patient admits to nausea over the past 2 days  Patient states he recently went for his eye exam and noticed the vision in his left eye is much worse than it has been in the past   Denies sore throat, dysphagia, trismus, drooling, neck stiffness, rash, cough, congestion, ear pain, fever, chills  Denies sick contacts  Denies animal exposure  History provided by:  Patient      Prior to Admission Medications   Prescriptions Last Dose Informant Patient Reported? Taking?    FLUoxetine (PROzac) 40 MG capsule   No No   Sig: Take 1 capsule (40 mg total) by mouth daily   Patient not taking: Reported on 7/1/2021   albuterol (VENTOLIN HFA) 90 mcg/act inhaler   Yes No   Sig: Inhale 2 puffs   cyclobenzaprine (FLEXERIL) 10 mg tablet   No No   Sig: Take 1 tablet (10 mg total) by mouth 3 (three) times a day as needed for muscle spasms   fluticasone (FLOVENT HFA) 44 mcg/act inhaler   Yes No   Sig: Inhale 2 puffs every 12 (twelve) hours   ketorolac (TORADOL) 10 mg tablet   No No   Sig: Take 1 tablet (10 mg total) by mouth every 8 (eight) hours as needed for moderate pain (migraine/headache)   Patient not taking: Reported on 7/1/2021   methocarbamol (ROBAXIN) 500 mg tablet   No No   Sig: Take 1 tablet (500 mg total) by mouth 2 (two) times a day   Patient not taking: Reported on 7/1/2021   topiramate (TOPAMAX) 50 MG tablet   No No   Sig: Take 1 tablet (50 mg total) by mouth 2 (two) times a day      Facility-Administered Medications: None       Past Medical History:   Diagnosis Date    Ankle fracture     right    Class 3 severe obesity due to excess calories without serious comorbidity with body mass index (BMI) of 45 0 to 49 9 in adult [facility]) 4/28/2020    Fall 9353-2925    "fell off of a roof"    Mild intermittent asthma        Past Surgical History:   Procedure Laterality Date    TOOTH EXTRACTION         Family History   Problem Relation Age of Onset    Diabetes Mother     Breast cancer Mother     Diabetes Father     Hypertension Father     Diabetes Sister     Diabetes Brother     Hypertension Brother     Hypertension Maternal Grandfather     Diabetes Maternal Grandfather     Thyroid disease Maternal Grandfather     Diabetes Paternal Grandmother     Alzheimer's disease Paternal Grandfather     Thyroid disease Maternal Uncle     Alzheimer's disease Paternal Aunt      I have reviewed and agree with the history as documented  E-Cigarette/Vaping    E-Cigarette Use Never User      E-Cigarette/Vaping Substances     Social History     Tobacco Use    Smoking status: Former Smoker     Types: Cigarettes    Smokeless tobacco: Former User   Vaping Use    Vaping Use: Never used   Substance Use Topics    Alcohol use: Yes     Comment: socially    Drug use: No       Review of Systems   Constitutional: Negative for appetite change, chills, fever and unexpected weight change  HENT: Negative for congestion, drooling, ear pain, rhinorrhea, sore throat, trouble swallowing and voice change  Neck swelling   Eyes: Negative for pain, discharge, redness and visual disturbance  Respiratory: Negative for cough, shortness of breath, wheezing and stridor      Cardiovascular: Negative for chest pain, palpitations and leg swelling  Gastrointestinal: Positive for nausea  Negative for abdominal pain, blood in stool, constipation, diarrhea and vomiting  Genitourinary: Negative for dysuria, flank pain, frequency, hematuria and urgency  Musculoskeletal: Negative for gait problem, joint swelling, neck pain and neck stiffness  Skin: Negative for color change and rash  Neurological: Positive for headaches (Chronic)  Negative for dizziness, seizures, weakness, light-headedness and numbness  Physical Exam  Physical Exam  Vitals and nursing note reviewed  Constitutional:       Appearance: He is well-developed  HENT:      Head: Normocephalic and atraumatic  Right Ear: Tympanic membrane, ear canal and external ear normal       Left Ear: Tympanic membrane, ear canal and external ear normal       Nose: Nose normal       Mouth/Throat:      Mouth: Mucous membranes are moist    Eyes:      Conjunctiva/sclera: Conjunctivae normal       Pupils: Pupils are equal, round, and reactive to light  Neck:      Trachea: Trachea and phonation normal       Comments: Hard non mobile mass (3 cm x 3 cm) on the left posterior neck extending into the left shoulder  Hard non mobile mass (smaller) on the right posterior neck  Limited ROM of the left neck secondary to discomfort  Cardiovascular:      Rate and Rhythm: Normal rate and regular rhythm  Pulmonary:      Effort: Pulmonary effort is normal       Breath sounds: Normal breath sounds  Musculoskeletal:         General: Normal range of motion  Cervical back: Normal range of motion and neck supple  Skin:     General: Skin is warm and dry  Capillary Refill: Capillary refill takes less than 2 seconds  Neurological:      Mental Status: He is alert and oriented to person, place, and time           Vital Signs  ED Triage Vitals [07/13/21 0835]   Temperature Pulse Respirations Blood Pressure SpO2   98 4 °F (36 9 °C) 89 18 162/93 97 %      Temp Source Heart Rate Source Patient Position - Orthostatic VS BP Location FiO2 (%)   Oral Monitor Sitting Left arm --      Pain Score       --           Vitals:    07/13/21 0835   BP: 162/93   Pulse: 89   Patient Position - Orthostatic VS: Sitting         Visual Acuity      ED Medications  Medications   iohexol (OMNIPAQUE) 350 MG/ML injection (SINGLE-DOSE) 100 mL (100 mL Intravenous Given 7/13/21 1051)       Diagnostic Studies  Results Reviewed     Procedure Component Value Units Date/Time    Comprehensive metabolic panel [[id number]]  (Abnormal) Collected: 07/13/21 0955    Lab Status: Final result Specimen: Blood from Arm, Right Updated: 07/13/21 1038     Sodium 140 mmol/L      Potassium 4 2 mmol/L      Chloride 102 mmol/L      CO2 30 mmol/L      ANION GAP 8 mmol/L      BUN 10 mg/dL      Creatinine 0 76 mg/dL      Glucose 89 mg/dL      Calcium 9 3 mg/dL      AST 24 U/L      ALT 62 U/L      Alkaline Phosphatase 78 U/L      Total Protein 8 3 g/dL      Albumin 4 2 g/dL      Total Bilirubin 0 52 mg/dL      eGFR 126 ml/min/1 73sq m     Narrative:      [place] guidelines for Chronic Kidney Disease (CKD):     Stage 1 with normal or high GFR (GFR > 90 mL/min/1 73 square meters)    Stage 2 Mild CKD (GFR = 60-89 mL/min/1 73 square meters)    Stage 3A Moderate CKD (GFR = 45-59 mL/min/1 73 square meters)    Stage 3B Moderate CKD (GFR = 30-44 mL/min/1 73 square meters)    Stage 4 Severe CKD (GFR = 15-29 mL/min/1 73 square meters)    Stage 5 End Stage CKD (GFR <15 mL/min/1 73 square meters)  Note: GFR calculation is accurate only with a steady state creatinine    CBC and differential [[id number]]  (Abnormal) Collected: 07/13/21 0955    Lab Status: Final result Specimen: Blood from Arm, Right Updated: 07/13/21 1019     WBC 6 18 Thousand/uL      RBC 5 62 Million/uL      Hemoglobin 16 4 g/dL      Hematocrit 52 2 %      MCV 93 fL      MCH 29 2 pg      MCHC 31 4 g/dL      RDW 13 3 %      MPV 9 3 fL      Platelets 967 Thousands/uL      nRBC 0 /100 WBCs      Neutrophils Relative 64 %      Immat GRANS % 0 %      Lymphocytes Relative 26 %      Monocytes Relative 7 %      Eosinophils Relative 3 %      Basophils Relative 0 %      Neutrophils Absolute 3 97 Thousands/µL      Immature Grans Absolute 0 01 Thousand/uL      Lymphocytes Absolute 1 59 Thousands/µL      Monocytes Absolute 0 41 Thousand/µL      Eosinophils Absolute 0 18 Thousand/µL      Basophils Absolute 0 02 Thousands/µL                  CT soft tissue neck with contrast   Final Result by Tiago Gomez MD (07/13 1123)      Cellulitis/adenitis deep to surface fiducial placed within the depth of a posterior left neck skin fold  No convincing evidence for abscess  Workstation performed: [id number]         CT head without contrast   Final Result by Tiago Gomez MD (07/13 1116)      No acute intracranial abnormality  Workstation performed: [id number]                    Procedures  Procedures         ED Course                                           MDM  Number of Diagnoses or Management Options  Neck swelling: new and requires workup  Diagnosis management comments: Labs unremarkable  CT head without acute findings  CT soft tissue neck significant for cellulitis/adenitis deep to surface fiducial placed within the depth of a posterior left neck skin fold  No convincing evidence for abscess  Will treat for possible cellulitis versus adenitis with Augmentin  Patient was instructed to follow up outpatient with ear nose and throat specialist as the patient may require a biopsy  I provided patient with strict RTER precautions  I advised patient follow-up with PCP in 24-48 hours  Patient verbalized understanding          Amount and/or Complexity of Data Reviewed  Clinical lab tests: reviewed and ordered  Tests in the radiology section of CPT®: ordered and reviewed  Review and summarize past medical records: yes    Patient Progress  Patient progress: stable      Disposition  Final diagnoses:   Neck swelling     Time reflects when diagnosis was documented in both MDM as applicable and the Disposition within this note     Time User Action Codes Description Comment    7/13/2021 11:29 AM Darlynyamil Garcia Add [R22 1] Neck swelling       ED Disposition     ED Disposition Condition Date/Time Comment    Discharge Stable Tue Jul 13, 2021 11:42 AM Royal Chen discharge to home/self care              Follow-up Information     Follow up With Specialties Details Why Contact Info Additional [address] Emergency Department Emergency Medicine Go to  If symptoms worsen - worsening swelling, difficulty swallowing/breathing, fever, chills [address] [facility] [id number] [place] [address] Emergency Department, [address], [place], [place], [facility] [address], [facility], Internal Medicine, Nurse Practitioner Schedule an appointment as soon as possible for a visit   [address] [place] [address] [facility]       Bailey Villa MD Otolaryngology, Plastic Surgery Schedule an appointment as soon as possible for a visit   Reinier Godfrey [address]  [phone]             Discharge Medication List as of 7/13/2021 11:42 AM      START taking these medications    Details   amoxicillin-clavulanate (AUGMENTIN) 875-125 mg per tablet Take 1 tablet by mouth every 12 (twelve) hours for 7 days, Starting Tue 7/13/2021, Until Tue 7/20/2021, Normal         CONTINUE these medications which have NOT CHANGED    Details   albuterol (VENTOLIN HFA) 90 mcg/act inhaler Inhale 2 puffs, Historical Med      cyclobenzaprine (FLEXERIL) 10 mg tablet Take 1 tablet (10 mg total) by mouth 3 (three) times a day as needed for muscle spasms, Starting Tue 5/25/2021, Normal      FLUoxetine (PROzac) 40 MG capsule Take 1 capsule (40 mg total) by mouth daily, Starting Mon 1/25/2021, Normal      fluticasone (FLOVENT HFA) 44 mcg/act inhaler Inhale 2 puffs every 12 (twelve) hours, Starting Wed 11/13/2013, Historical Med      ketorolac (TORADOL) 10 mg tablet Take 1 tablet (10 mg total) by mouth every 8 (eight) hours as needed for moderate pain (migraine/headache), Starting u 4/8/2021, Normal      methocarbamol (ROBAXIN) 500 mg tablet Take 1 tablet (500 mg total) by mouth 2 (two) times a day, Starting Thu 4/29/2021, Normal      topiramate (TOPAMAX) 50 MG tablet Take 1 tablet (50 mg total) by mouth 2 (two) times a day, Starting Wed 4/21/2021, Normal           No discharge procedures on file      PDMP Review     None          ED Provider  Electronically Signed by           Toisn Cruz PA-C  07/13/21 8872

## 2021-07-20 ENCOUNTER — TELEPHONE (OUTPATIENT)
Dept: FAMILY MEDICINE CLINIC | Facility: CLINIC | Age: 26
End: 2021-07-20

## 2021-07-20 ENCOUNTER — OFFICE VISIT (OUTPATIENT)
Dept: FAMILY MEDICINE CLINIC | Facility: CLINIC | Age: 26
End: 2021-07-20
Payer: COMMERCIAL

## 2021-07-20 VITALS
WEIGHT: 315 LBS | DIASTOLIC BLOOD PRESSURE: 90 MMHG | BODY MASS INDEX: 44.1 KG/M2 | OXYGEN SATURATION: 96 % | RESPIRATION RATE: 16 BRPM | HEART RATE: 102 BPM | TEMPERATURE: 97.9 F | HEIGHT: 71 IN | SYSTOLIC BLOOD PRESSURE: 138 MMHG

## 2021-07-20 DIAGNOSIS — M54.50 ACUTE BILATERAL LOW BACK PAIN WITHOUT SCIATICA: ICD-10-CM

## 2021-07-20 PROCEDURE — 99213 OFFICE O/P EST LOW 20 MIN: CPT | Performed by: NURSE PRACTITIONER

## 2021-07-20 RX ORDER — PREDNISONE 20 MG/1
TABLET ORAL
COMMUNITY
Start: 2021-07-18 | End: 2021-08-23

## 2021-07-20 RX ORDER — CYCLOBENZAPRINE HCL 5 MG
TABLET ORAL
COMMUNITY
Start: 2021-07-18 | End: 2021-07-20

## 2021-07-20 RX ORDER — CYCLOBENZAPRINE HCL 10 MG
10 TABLET ORAL 3 TIMES DAILY PRN
Qty: 30 TABLET | Refills: 0 | Status: SHIPPED | OUTPATIENT
Start: 2021-07-20 | End: 2022-03-29

## 2021-07-20 NOTE — TELEPHONE ENCOUNTER
Pt called and stated that his employer asked if he would be able to come in to work with the weight restriction of 20 lbs tomorrow, rather than on Monday  Pt stated that he feels well enough to do that  If permitted, pt requesting a revised note  Please advise
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
## 2021-07-22 PROBLEM — R06.83 SNORING: Status: RESOLVED | Noted: 2020-04-28 | Resolved: 2021-07-22

## 2021-07-22 PROBLEM — R40.0 DAYTIME SOMNOLENCE: Status: RESOLVED | Noted: 2020-04-28 | Resolved: 2021-07-22

## 2021-07-22 PROBLEM — F41.9 ANXIETY: Status: RESOLVED | Noted: 2020-09-27 | Resolved: 2021-07-22

## 2021-07-22 PROBLEM — R51.9 NONINTRACTABLE HEADACHE: Status: RESOLVED | Noted: 2020-04-28 | Resolved: 2021-07-22

## 2021-07-22 PROBLEM — R41.3 POOR SHORT TERM MEMORY: Status: RESOLVED | Noted: 2020-04-28 | Resolved: 2021-07-22

## 2021-07-22 PROBLEM — R06.81 APNEA: Status: RESOLVED | Noted: 2020-04-28 | Resolved: 2021-07-22

## 2021-07-26 ENCOUNTER — TELEMEDICINE (OUTPATIENT)
Dept: FAMILY MEDICINE CLINIC | Facility: CLINIC | Age: 26
End: 2021-07-26

## 2021-07-26 DIAGNOSIS — M54.50 ACUTE BILATERAL LOW BACK PAIN WITHOUT SCIATICA: Primary | ICD-10-CM

## 2021-07-26 PROCEDURE — 99213 OFFICE O/P EST LOW 20 MIN: CPT | Performed by: NURSE PRACTITIONER
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
## 2021-07-26 NOTE — LETTER
July 26, 2021     Patient: Mica Hogan   YOB: 1995   Date of Visit: 7/26/2021       To Whom it May Concern:    Pee Luis is under my professional care  He was seen in my office on 7/26/2021  He may return to work on 7/27/21  Able to return to work full duty, full time with no restrictions  If you have any questions or concerns, please don't hesitate to call           Sincerely,          PRIMO Malin        CC: No Recipients

## 2021-08-23 ENCOUNTER — OFFICE VISIT (OUTPATIENT)
Dept: FAMILY MEDICINE CLINIC | Facility: CLINIC | Age: 26
End: 2021-08-23
Payer: COMMERCIAL

## 2021-08-23 VITALS
WEIGHT: 315 LBS | TEMPERATURE: 98 F | SYSTOLIC BLOOD PRESSURE: 118 MMHG | BODY MASS INDEX: 44.1 KG/M2 | HEART RATE: 95 BPM | DIASTOLIC BLOOD PRESSURE: 80 MMHG | RESPIRATION RATE: 16 BRPM | OXYGEN SATURATION: 95 % | HEIGHT: 71 IN

## 2021-08-23 DIAGNOSIS — E66.01 MORBID OBESITY (HCC): ICD-10-CM

## 2021-08-23 DIAGNOSIS — I88.9 ADENITIS: Primary | ICD-10-CM

## 2021-08-23 PROCEDURE — 99213 OFFICE O/P EST LOW 20 MIN: CPT | Performed by: NURSE PRACTITIONER

## 2021-08-23 RX ORDER — DOXYCYCLINE HYCLATE 100 MG/1
100 CAPSULE ORAL EVERY 12 HOURS SCHEDULED
Qty: 20 CAPSULE | Refills: 0 | Status: SHIPPED | OUTPATIENT
Start: 2021-08-23 | End: 2021-09-02

## 2021-08-23 NOTE — PROGRESS NOTES
FAMILY PRACTICE OFFICE VISIT       NAME: Ion Camacho  AGE: 32 y o  SEX: male       : 1995        MRN: [id number]    DATE: 2021  TIME: 9:28 AM    Assessment and Plan   1  Adenitis  -     Ambulatory Referral to Otolaryngology; Future  -     doxycycline hyclate (VIBRAMYCIN) 100 mg capsule; Take 1 capsule (100 mg total) by mouth every 12 (twelve) hours for 10 days    2  Morbid obesity ([address] )  -     Ambulatory referral to Bariatric Surgery; Future                 Chief Complaint     Chief Complaint   Patient presents with    Follow-up     Patient is here for his low back pain which is better  History of Present Illness   Ion Camacho is a 32y o -year-old male who is here for f/u to neck pain  Ct neck reviewed  Adenitis/cellulitis on ct scan of left side of neck  Patient took antibiotics and states no improvement in symptoms  Continues with pain  No redness or warmth to area  Tenderness persists  States has been bothering him for 4 to 5 months now  No fever or chills  Was first told it was muscle spasm by ER provider per patient  Zenaida Funes to ER for pain      Review of Systems   Review of Systems   Constitutional: Negative for fatigue and fever  HENT: Negative for congestion and postnasal drip  Eyes: Negative for photophobia and visual disturbance  Respiratory: Negative for cough, shortness of breath and wheezing  Cardiovascular: Negative for chest pain and palpitations  Gastrointestinal: Negative for constipation, diarrhea, nausea and vomiting  Musculoskeletal: Positive for neck pain  Negative for arthralgias  Skin: Negative for rash  Neurological: Negative for dizziness and light-headedness  Hematological: Negative for adenopathy  Psychiatric/Behavioral: Negative for dysphoric mood  The patient is not nervous/anxious          Active Problem List     Patient Active Problem List   Diagnosis    Mild intermittent asthma without complication    IFG (impaired fasting glucose)    Morbid obesity ([facility] )    Obstructive sleep apnea (adult) (pediatric)    Primary narcolepsy without cataplexy    Numbness and tingling in both hands    Acute bilateral low back pain without sciatica    Adenitis         Past Medical History:  Past Medical History:   Diagnosis Date    Ankle fracture     right    Anxiety 9/27/2020    Apnea 4/28/2020    Class 3 severe obesity due to excess calories without serious comorbidity with body mass index (BMI) of 45 0 to 49 9 in adult ([facility] ) 4/28/2020    Daytime somnolence 4/28/2020    Fall [id number]    "fell off of a roof"    Mild intermittent asthma     Nonintractable headache 4/28/2020    Poor short term memory 4/28/2020    Snoring 4/28/2020       Past Surgical History:  Past Surgical History:   Procedure Laterality Date    TOOTH EXTRACTION         Family History:  Family History   Problem Relation Age of Onset    Diabetes Mother     Breast cancer Mother     Diabetes Father     Hypertension Father     Diabetes Sister     Diabetes Brother     Hypertension Brother     Hypertension Maternal Grandfather     Diabetes Maternal Grandfather     Thyroid disease Maternal Grandfather     Diabetes Paternal Grandmother     Alzheimer's disease Paternal Grandfather     Thyroid disease Maternal Uncle     Alzheimer's disease Paternal Aunt        Social History:  Social History     Socioeconomic History    Marital status: Single     Spouse name: Not on file    Number of children: Not on file    Years of education: Not on file    Highest education level: Not on file   Occupational History    Not on file   Tobacco Use    Smoking status: Former Smoker     Types: Cigarettes    Smokeless tobacco: Former User   Vaping Use    Vaping Use: Never used   Substance and Sexual Activity    Alcohol use: Yes     Comment: socially    Drug use: No    Sexual activity: Not on file   Other Topics Concern    Not on file   Social History Narrative    Daily coffee consumption:1 cup a day        Most recent tobacco use screenin2018     Social Determinants of Health     Financial Resource Strain:     Difficulty of Paying Living Expenses:    Food Insecurity:     Worried About Running Out of Food in the Last Year:     [address] in the Last Year:    Transportation Needs:     Lack of Transportation (Medical):  Lack of Transportation (Non-Medical):    Physical Activity:     Days of Exercise per Week:     Minutes of Exercise per Session:    Stress:     Feeling of Stress :    Social Connections:     Frequency of Communication with Friends and Family:     Frequency of Social Gatherings with Friends and Family:     Attends Anabaptism Services:     Active Member of Clubs or Organizations:     Attends Club or Organization Meetings:     Marital Status:    Intimate Partner Violence:     Fear of Current or Ex-Partner:     Emotionally Abused:     Physically Abused:     Sexually Abused:        Objective     Vitals:    21 0859   BP: 118/80   Pulse: 95   Resp: 16   Temp: 98 °F (36 7 °C)   SpO2: 95%     Wt Readings from Last 3 Encounters:   21 (!) 191 kg (421 lb 6 4 oz)   21 (!) 185 kg (408 lb 6 4 oz)   21 (!) 183 kg (404 lb)       Physical Exam  Vitals and nursing note reviewed  Constitutional:       Appearance: Normal appearance  HENT:      Head: Normocephalic and atraumatic  Nose: Nose normal    Eyes:      Conjunctiva/sclera: Conjunctivae normal    Cardiovascular:      Rate and Rhythm: Normal rate and regular rhythm  Heart sounds: Normal heart sounds  Pulmonary:      Effort: Pulmonary effort is normal       Breath sounds: Normal breath sounds  Musculoskeletal:         General: Normal range of motion  Cervical back: Normal range of motion  Tenderness present  Back:       Comments: 2x3cm lump tender to palpation     Skin:     General: Skin is warm and dry  Capillary Refill: Capillary refill takes less than 2 seconds  Neurological:      Mental Status: He is alert and oriented to person, place, and time     Psychiatric:         Mood and Affect: Mood normal          Behavior: Behavior normal          Pertinent Laboratory/Diagnostic Studies:  Lab Results   Component Value Date    GLUCOSE 87 07/01/2015    BUN 10 07/13/2021    CREATININE 0 76 07/13/2021    CALCIUM 9 3 07/13/2021     07/01/2015    K 4 2 07/13/2021    CO2 30 07/13/2021     07/13/2021     Lab Results   Component Value Date    ALT 62 07/13/2021    AST 24 07/13/2021    ALKPHOS 78 07/13/2021    BILITOT 0 2 07/01/2015       Lab Results   Component Value Date    WBC 6 18 07/13/2021    HGB 16 4 07/13/2021    HCT 52 2 (H) 07/13/2021    MCV 93 07/13/2021     07/13/2021       No results found for: TSH    No results found for: CHOL  Lab Results   Component Value Date    TRIG 130 01/21/2021     Lab Results   Component Value Date    HDL 41 01/21/2021     Lab Results   Component Value Date    LDLCALC 105 (H) 01/21/2021     Lab Results   Component Value Date    HGBA1C 5 8 (H) 01/21/2021       Results for orders placed or performed during the hospital encounter of 07/13/21   CBC and differential   Result Value Ref Range    WBC 6 18 4 31 - 10 16 Thousand/uL    RBC 5 62 3 88 - 5 62 Million/uL    Hemoglobin 16 4 12 0 - 17 0 g/dL    Hematocrit 52 2 (H) 36 5 - 49 3 %    MCV 93 82 - 98 fL    MCH 29 2 26 8 - 34 3 pg    MCHC 31 4 31 4 - 37 4 g/dL    RDW 13 3 11 6 - 15 1 %    MPV 9 3 8 9 - 12 7 fL    Platelets 167 136 - 770 Thousands/uL    nRBC 0 /100 WBCs    Neutrophils Relative 64 43 - 75 %    Immat GRANS % 0 0 - 2 %    Lymphocytes Relative 26 14 - 44 %    Monocytes Relative 7 4 - 12 %    Eosinophils Relative 3 0 - 6 %    Basophils Relative 0 0 - 1 %    Neutrophils Absolute 3 97 1 85 - 7 62 Thousands/µL    Immature Grans Absolute 0 01 0 00 - 0 20 Thousand/uL    Lymphocytes Absolute 1 59 0 60 - 4 47 Thousands/µL    Monocytes Absolute 0 41 0 17 - 1 22 Thousand/µL Eosinophils Absolute 0 18 0 00 - 0 61 Thousand/µL    Basophils Absolute 0 02 0 00 - 0 10 Thousands/µL   Comprehensive metabolic panel   Result Value Ref Range    Sodium 140 136 - 145 mmol/L    Potassium 4 2 3 5 - 5 3 mmol/L    Chloride 102 100 - 108 mmol/L    CO2 30 21 - 32 mmol/L    ANION GAP 8 4 - 13 mmol/L    BUN 10 5 - 25 mg/dL    Creatinine 0 76 0 60 - 1 30 mg/dL    Glucose 89 65 - 140 mg/dL    Calcium 9 3 8 3 - 10 1 mg/dL    AST 24 5 - 45 U/L    ALT 62 12 - 78 U/L    Alkaline Phosphatase 78 46 - 116 U/L    Total Protein 8 3 (H) 6 4 - 8 2 g/dL    Albumin 4 2 3 5 - 5 0 g/dL    Total Bilirubin 0 52 0 20 - 1 00 mg/dL    eGFR 126 ml/min/1 73sq m       Orders Placed This Encounter   Procedures    Ambulatory Referral to Otolaryngology    Ambulatory referral to Bariatric Surgery       ALLERGIES:  No Known Allergies    Current Medications     Current Outpatient Medications   Medication Sig Dispense Refill    albuterol (VENTOLIN HFA) 90 mcg/act inhaler Inhale 2 puffs      cyclobenzaprine (FLEXERIL) 10 mg tablet Take 1 tablet (10 mg total) by mouth 3 (three) times a day as needed for muscle spasms 30 tablet 0    ketorolac (TORADOL) 10 mg tablet Take 1 tablet (10 mg total) by mouth every 8 (eight) hours as needed for moderate pain (migraine/headache) 30 tablet 0    topiramate (TOPAMAX) 50 MG tablet Take 1 tablet (50 mg total) by mouth 2 (two) times a day 60 tablet 3    doxycycline hyclate (VIBRAMYCIN) 100 mg capsule Take 1 capsule (100 mg total) by mouth every 12 (twelve) hours for 10 days 20 capsule 0     No current facility-administered medications for this visit           Health Maintenance     Health Maintenance   Topic Date Due    Influenza Vaccine (1) 09/01/2021    Annual Physical  11/10/2021    Depression Screening PHQ  01/21/2022    HPV Vaccine (1 - Male 2-dose series) 11/11/2021 (Originally 2/24/2006)    HIV Screening  04/03/2023 (Originally 2/24/2010)    Hepatitis C Screening  08/20/2023 (Originally 1995)    BMI: Followup Plan  04/08/2022    BMI: Adult  07/20/2022    DTaP,Tdap,and Td Vaccines (2 - Td or Tdap) 01/03/2028    COVID-19 Vaccine  Completed    HIB Vaccine  Aged Out    Hepatitis B Vaccine  Aged Out    IPV Vaccine  Aged Out    Hepatitis A Vaccine  Aged Out    Meningococcal ACWY Vaccine  Aged Out    Pneumococcal Vaccine: Pediatrics (0 to 5 Years) and At-Risk Patients (6 to 59 Years)  Discontinued     Immunization History   Administered Date(s) Administered    INFLUENZA 01/03/2018    Influenza Quadrivalent Preservative Free 3 years and older IM 01/03/2018    Influenza, injectable, quadrivalent, preservative free 0 5 mL 11/10/2020    SARS-CoV-2 / COVID-19 mRNA IM (Ferman Epley) 05/26/2021, 06/23/2021    Tdap 01/03/2018          PRIMO Kulkarni Arm

## 2021-10-19 ENCOUNTER — OCCMED (OUTPATIENT)
Dept: URGENT CARE | Facility: MEDICAL CENTER | Age: 26
End: 2021-10-19
Payer: OTHER MISCELLANEOUS

## 2021-10-19 ENCOUNTER — APPOINTMENT (OUTPATIENT)
Dept: RADIOLOGY | Facility: MEDICAL CENTER | Age: 26
End: 2021-10-19

## 2021-10-19 DIAGNOSIS — M79.671 RIGHT FOOT PAIN: Primary | ICD-10-CM

## 2021-10-19 DIAGNOSIS — M79.671 RIGHT FOOT PAIN: ICD-10-CM

## 2021-10-19 PROCEDURE — 99283 EMERGENCY DEPT VISIT LOW MDM: CPT | Performed by: PHYSICIAN ASSISTANT

## 2021-10-19 PROCEDURE — G0382 LEV 3 HOSP TYPE B ED VISIT: HCPCS | Performed by: PHYSICIAN ASSISTANT

## 2021-10-19 PROCEDURE — 73630 X-RAY EXAM OF FOOT: CPT

## 2021-10-26 ENCOUNTER — APPOINTMENT (OUTPATIENT)
Dept: URGENT CARE | Facility: MEDICAL CENTER | Age: 26
End: 2021-10-26
Payer: OTHER MISCELLANEOUS

## 2021-10-26 PROCEDURE — 99213 OFFICE O/P EST LOW 20 MIN: CPT | Performed by: PHYSICIAN ASSISTANT

## 2021-10-27 ENCOUNTER — SOCIAL WORK (OUTPATIENT)
Dept: BEHAVIORAL/MENTAL HEALTH CLINIC | Facility: CLINIC | Age: 26
End: 2021-10-27

## 2021-10-27 DIAGNOSIS — F32.A DEPRESSION, UNSPECIFIED DEPRESSION TYPE: Primary | ICD-10-CM

## 2021-10-27 PROCEDURE — 90834 PSYTX W PT 45 MINUTES: CPT | Performed by: SOCIAL WORKER

## 2021-11-19 ENCOUNTER — TELEPHONE (OUTPATIENT)
Dept: FAMILY MEDICINE CLINIC | Facility: CLINIC | Age: 26
End: 2021-11-19

## 2021-12-01 ENCOUNTER — OFFICE VISIT (OUTPATIENT)
Dept: FAMILY MEDICINE CLINIC | Facility: CLINIC | Age: 26
End: 2021-12-01

## 2021-12-01 VITALS
WEIGHT: 315 LBS | HEART RATE: 89 BPM | DIASTOLIC BLOOD PRESSURE: 80 MMHG | BODY MASS INDEX: 44.1 KG/M2 | HEIGHT: 71 IN | SYSTOLIC BLOOD PRESSURE: 120 MMHG | RESPIRATION RATE: 18 BRPM | OXYGEN SATURATION: 98 % | TEMPERATURE: 97.7 F

## 2021-12-01 DIAGNOSIS — F34.1 DYSTHYMIA: Primary | ICD-10-CM

## 2021-12-01 PROCEDURE — 99213 OFFICE O/P EST LOW 20 MIN: CPT | Performed by: NURSE PRACTITIONER

## 2022-01-17 ENCOUNTER — TELEPHONE (OUTPATIENT)
Dept: FAMILY MEDICINE CLINIC | Facility: CLINIC | Age: 27
End: 2022-01-17

## 2022-01-17 NOTE — TELEPHONE ENCOUNTER
Patients girlfriend called and state that patient got jury duty and looking for a note to excuse him from jury duty  Please advise

## 2022-03-28 ENCOUNTER — TELEPHONE (OUTPATIENT)
Dept: FAMILY MEDICINE CLINIC | Facility: CLINIC | Age: 27
End: 2022-03-28

## 2022-03-29 ENCOUNTER — TELEMEDICINE (OUTPATIENT)
Dept: FAMILY MEDICINE CLINIC | Facility: CLINIC | Age: 27
End: 2022-03-29

## 2022-03-29 VITALS — TEMPERATURE: 98.7 F

## 2022-03-29 DIAGNOSIS — J45.20 MILD INTERMITTENT ASTHMA WITHOUT COMPLICATION: ICD-10-CM

## 2022-03-29 DIAGNOSIS — M62.838 MUSCLE SPASM: Primary | ICD-10-CM

## 2022-03-29 PROCEDURE — 99213 OFFICE O/P EST LOW 20 MIN: CPT | Performed by: NURSE PRACTITIONER

## 2022-03-29 RX ORDER — TIZANIDINE HYDROCHLORIDE 4 MG/1
4 CAPSULE, GELATIN COATED ORAL 3 TIMES DAILY PRN
Qty: 30 CAPSULE | Refills: 0 | Status: SHIPPED | OUTPATIENT
Start: 2022-03-29

## 2022-03-29 RX ORDER — ALBUTEROL SULFATE 90 UG/1
2 AEROSOL, METERED RESPIRATORY (INHALATION) EVERY 4 HOURS PRN
Qty: 6.7 G | Refills: 1 | Status: SHIPPED | OUTPATIENT
Start: 2022-03-29

## 2022-03-29 NOTE — PROGRESS NOTES
Virtual Regular Visit    Verification of patient location:    Patient is located in the following state in which I hold an active license [place]      Assessment/Plan:    Problem List Items Addressed This Visit        Respiratory    Mild intermittent asthma without complication    Relevant Medications    albuterol (Ventolin HFA) 90 mcg/act inhaler       Other    Muscle spasm - Primary    Relevant Medications    TiZANidine (ZANAFLEX) 4 MG capsule          BMI Counseling: There is no height or weight on file to calculate BMI  The BMI is above normal  Nutrition recommendations include decreasing portion sizes, encouraging healthy choices of fruits and vegetables, decreasing fast food intake, consuming healthier snacks, limiting drinks that contain sugar, moderation in carbohydrate intake, increasing intake of lean protein, reducing intake of saturated and trans fat and reducing intake of cholesterol  Exercise recommendations include moderate physical activity 150 minutes/week, exercising 3-5 times per week and strength training exercises  No pharmacotherapy was ordered  Rationale for BMI follow-up plan is due to patient being overweight or obese  Reason for visit is   Chief Complaint   Patient presents with    Virtual Brief Visit    Virtual Regular Visit        Encounter provider PRIMO Blanchard    Provider located at [address] [place] [phone]      Recent Visits  Date Type Provider Dept   03/28/22 Telephone [facility] [address] [place] [address] recent visits within past 7 days and meeting all other requirements  Today's Visits  Date Type Provider Dept   03/29/22 Telemedicine PRIMO Blanchard  Lucy Santiago   Showing today's visits and meeting all other requirements  Future Appointments  No visits were found meeting these conditions    Showing future appointments within next 150 days and meeting all other requirements       The patient was identified by name and date of birth  Dora Mcclellan was informed that this is a telemedicine visit and that the visit is being conducted through [address] [facility] and patient was informed that this is a secure, HIPAA-compliant platform  He agrees to proceed     My office door was closed  No one else was in the room  He acknowledged consent and understanding of privacy and security of the video platform  The patient has agreed to participate and understands they can discontinue the visit at any time  Patient is aware this is a billable service  Subjective  Dora Mcclellan is a 32 y o  male          Having sob and [place]  Did gain weight  Not sure how much he weighs now  Discussed bariatric surgery with patient, he will think about it    Also c/o low back pain, missed work today  Pulled back  Needs muscle relaxer  States flexeril does not work  Would like to try another med         Past Medical History:   Diagnosis Date    Ankle fracture     right    Anxiety 9/27/2020    Apnea 4/28/2020    Class 3 severe obesity due to excess calories without serious comorbidity with body mass index (BMI) of 45 0 to 49 9 in adult ([facility] ) 4/28/2020    Daytime somnolence 4/28/2020    Fall [id number]    "fell off of a roof"    Mild intermittent asthma     Nonintractable headache 4/28/2020    Poor short term memory 4/28/2020    Snoring 4/28/2020       Past Surgical History:   Procedure Laterality Date    TOOTH EXTRACTION         Current Outpatient Medications   Medication Sig Dispense Refill    albuterol (Ventolin HFA) 90 mcg/act inhaler Inhale 2 puffs every 4 (four) hours as needed for wheezing 6 7 g 1    ketorolac (TORADOL) 10 mg tablet Take 1 tablet (10 mg total) by mouth every 8 (eight) hours as needed for moderate pain (migraine/headache) 30 tablet 0    sertraline (Zoloft) 50 mg tablet Take 1 tablet (50 mg total) by mouth daily 30 tablet 5    topiramate (TOPAMAX) 50 MG tablet Take 1 tablet (50 mg total) by mouth 2 (two) times a day 60 tablet 3    TiZANidine (ZANAFLEX) 4 MG capsule Take 1 capsule (4 mg total) by mouth 3 (three) times a day as needed for muscle spasms 30 capsule 0     No current facility-administered medications for this visit  No Known Allergies    Review of Systems   Constitutional: Negative for fatigue and fever  HENT: Negative for congestion and postnasal drip  Respiratory: Positive for shortness of breath  Negative for apnea and chest tightness  Cardiovascular: Negative for chest pain, palpitations and leg swelling  Gastrointestinal: Negative for constipation, diarrhea, nausea and vomiting  Genitourinary: Negative for dysuria and frequency  Musculoskeletal: Positive for back pain  Negative for myalgias  Skin: Negative for rash  Neurological: Negative for dizziness, weakness, numbness and headaches  Hematological: Negative for adenopathy  Psychiatric/Behavioral: Negative for dysphoric mood and sleep disturbance  The patient is not nervous/anxious  It was my intent to perform this visit via video technology but the patient was not able to do a video connection so the visit was completed via audio telephone only  Video Exam    Vitals:    03/29/22 1321   Temp: 98 7 °F (37 1 °C)       Physical Exam  Neurological:      Mental Status: He is alert and oriented to person, place, and time  Psychiatric:         Mood and Affect: Mood normal          Behavior: Behavior normal           I spent 10 minutes with patient today in which greater than 50% of the time was spent in counseling/coordination of care regarding low back pain, sob    VIRTUAL VISIT DISCLAIMER      Broderick Course verbally agrees to participate in [employer]   Pt is aware that [employer] could be limited without vital signs or the ability to perform a full hands-on physical Dalton Suresh understands he or the provider may request at any time to terminate the video visit and request the patient to seek care or treatment in person
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
## 2022-06-20 ENCOUNTER — OFFICE VISIT (OUTPATIENT)
Dept: BARIATRICS | Facility: CLINIC | Age: 27
End: 2022-06-20

## 2022-06-20 VITALS
HEART RATE: 93 BPM | BODY MASS INDEX: 44.1 KG/M2 | TEMPERATURE: 96.9 F | DIASTOLIC BLOOD PRESSURE: 88 MMHG | HEIGHT: 71 IN | OXYGEN SATURATION: 97 % | WEIGHT: 315 LBS | SYSTOLIC BLOOD PRESSURE: 126 MMHG

## 2022-06-20 DIAGNOSIS — G47.33 OBSTRUCTIVE SLEEP APNEA (ADULT) (PEDIATRIC): ICD-10-CM

## 2022-06-20 DIAGNOSIS — E66.01 MORBID OBESITY (HCC): Primary | ICD-10-CM

## 2022-06-20 DIAGNOSIS — E66.01 MORBID OBESITY WITH BMI OF 50.0-59.9, ADULT (HCC): ICD-10-CM

## 2022-06-20 DIAGNOSIS — R20.2 NUMBNESS AND TINGLING IN BOTH HANDS: ICD-10-CM

## 2022-06-20 DIAGNOSIS — R73.01 IFG (IMPAIRED FASTING GLUCOSE): Primary | ICD-10-CM

## 2022-06-20 DIAGNOSIS — R20.0 NUMBNESS AND TINGLING IN BOTH HANDS: ICD-10-CM

## 2022-06-20 DIAGNOSIS — J45.20 MILD INTERMITTENT ASTHMA WITHOUT COMPLICATION: ICD-10-CM

## 2022-06-20 DIAGNOSIS — Z01.818 PRE-OPERATIVE CLEARANCE: ICD-10-CM

## 2022-06-20 PROCEDURE — RECHECK

## 2022-06-20 NOTE — PATIENT INSTRUCTIONS
Goals   Food Log 2500 calories or less per day   Increase vegetable intake with frozen vegetables   Recommend a one a day multivitamin and 2000 IU of Vitamin D3  Eat off 8-9 inch plate to reduce portion sizes
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
## 2022-06-20 NOTE — PROGRESS NOTES
Bariatric Behavioral Health Evaluation    Presenting Problem: Minoo Bernstein  is a 32 y o    male    :  1995   Patient presented with overall concerns of obesity  Stated that weight has impacted quality of life and has current/ future concerns with lack of mobility,/ movement,  chronic pain, and overall health  Has attempted various weight loss plans in the past including exercise, keto (lost 50#), calorie counting    Patient is Interested in exploring bariatric surgery  as an option for  weight loss goals  Is the patient seeking Bariatric Surgery Eval? Yes    Aunt is post surgery  Step brother's father post surgery (now )  Both with success  Realizes Post- Op Requirements? 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 - 5:30 pm      Activity:     Movement at work; Intends to go to the gym  Running after his children  Lives with:    Girlfriend and his two daughters  Lives with his parents  Niece (12)  is in the house too     Mother cooks dinner: Only meal they eat together  History of family doing keto together  Future of moving out and when they do girlfriend with do the cooking  Family Constellation (include relationship with each and Psych/Med HX)    Mother  obesity and Father  obesity   Brothers : obese  (one brother wanted to have surgery, but no insurance coverage)  Girl friend:  Over weight  / mental health with therapy  (looking into ins   Coverage for surgery)    Family:  Overweight  Psychiatric/Psychological Treatment Diagnosis:  Situational Depression and Anxiety with history Rx  Stopped taking medication 9 months ago  PCP is aware  ( hisotry of ending a relationship , but now back together  )              Outpatient Counselor Yes :   Two sessions of therapy in the past   Landen Frame is his therapist                 Psychiatrist No                Have you had Inpatient Treatment? No    Drug and/or Alcohol treatment history:   None         Tobacco History:   quit everything/  smoking  2018     (prior to birth of daughter)    Domestic Violence No      Abuse History:  none noted  Physical/Psychological Assessment:              Appearance: appropriate           Sociability: average           Affect: appropriate           Mood: calm           Thought Process: coherent           Speech: normal           Content: no impairment           Orientation: person  Yes , place  Yes , time  Yes , normal attention span  Yes , normal memory  Yes   and normal judgement  Yes            Insight: emotional  good      Risk Assessment:                Risk of Harm to Self or Others:   none noted during evaluation  No HI/SI                Observation: Interviews :  this interview only (will follow Edilson Mann through the bariatric program)                Access to weapons : not reported                Based on the previous information, the client presents the following risk of harm to self or others: low      Recommendations: Recommended for surgery  yes       Note :  Patient presented for behavioral health evaluation for the bariatric program    Previous Mental Health diagnosis of Depression and Anxiety with Rx, but denies this is current now       Brief history of  therapy  Denies history of  Psychiatry  Denies history of  Drug and/or Alcohol abuse or treatment  No current tobacco use    Patient educated regarding the impact of nicotine and alcohol on the post surgery bariatric patient  Patient has a negative family history of tobacco and alcohol addiction  Patient meets criteria for surgery at this time and is referred to the bariatric surgeon  DINH Renteria, LCSW  _____________________________      BARIATRIC SURGERY EDUCATION CHECKLIST     Education related to my bariatric surgery process:     Patients may be required to complete a psychiatric evaluation and receive clearance for surgery from their psychiatrist     Patients who undergo weight loss surgery are at higher risk of increased mental health concerns and suicide attempts  Patients may be required to complete a full substance abuse evaluation and then complete all  treatment recommendations prior to surgery  If diagnosis of abuse/dependence results, patient may be required to remain sober for one (1) year before having bariatric surgery  Patient's on psychiatric medications should check with their provider to discuss psychiatric medications and the changes in absorption  Patient should discuss all time release medications with provider and take all medications as prescribed  The recommendation is that there is no use of any tobacco products, Hookah or  vapes for the bariatric post-operation patient  Bariatric surgery patients should not consume alcohol as a post-operative patient as it may increase risk of numerous health conditions including but not limited to alcohol abuse and ulcers  There is a possibility of weight regain if patient does not follow all program guidelines and recommendations  Bariatric surgery patients should exercise thirty (30) to sixty (60) minutes per day to maintain post-surgical weight loss  Research indicates that bariatric patients are more successful when they see a therapist for up to two (2) years post-op  Patients will follow all medical and dietary recommendations provided      Patient will keep all scheduled appointments and follow up with their physician for a minimum of five (5) years  Patient will take all vitamins as recommended  Post-operative vitamins are life-long  There is a goal month set  All requirements should be met by this time  Don't wait to get started! There is a deadline month set  All requirements must be finished by this time and if not, the patient will be halted in the surgery process  The patient can be referred to the medical weight management program or can come back to the surgical program once the unfinished tasks from the previous program are completed

## 2022-07-02 ENCOUNTER — NURSE TRIAGE (OUTPATIENT)
Dept: OTHER | Facility: OTHER | Age: 27
End: 2022-07-02

## 2022-07-02 NOTE — TELEPHONE ENCOUNTER
Reason for Disposition   HIGH RISK for severe COVID complications (e g , weak immune system, age > 59 years, obesity with BMI > 22, pregnant, chronic lung disease or other chronic medical condition)  (Exception: Already seen by PCP and no new or worsening symptoms )    Protocols used: CORONAVIRUS (COVID-19) DIAGNOSED OR SUSPECTED-ADULT-

## 2022-07-02 NOTE — TELEPHONE ENCOUNTER
Answer Assessment - Initial Assessment Questions  1  COVID-19 DIAGNOSIS: "Who made your COVID-19 diagnosis?" "Was it confirmed by a positive lab test or self-test?" If not diagnosed by a doctor (or NP/PA), ask "Are there lots of cases (community spread) where you live?" Note: See public health department website, if unsure  Home test positive  2  COVID-19 EXPOSURE: "Was there any known exposure to COVID before the symptoms began?" CDC Definition of close contact: within 6 feet (2 meters) for a total of 15 minutes or more over a 24-hour period  unknown  3  ONSET: "When did the COVID-19 symptoms start?"       Yesterday 7/1  4  WORST SYMPTOM: "What is your worst symptom?" (e g , cough, fever, shortness of breath, muscle aches)      Sore throat  5  COUGH: "Do you have a cough?" If Yes, ask: "How bad is the cough?"        Mild cough  6  FEVER: "Do you have a fever?" If Yes, ask: "What is your temperature, how was it measured, and when did it start?"      100  7  RESPIRATORY STATUS: "Describe your breathing?" (e g , shortness of breath, wheezing, unable to speak)       Has mild SOB due to size, but nothing worse than normal  8  BETTER-SAME-WORSE: "Are you getting better, staying the same or getting worse compared to yesterday?"  If getting worse, ask, "In what way?"      Slightly worse  9  HIGH RISK DISEASE: "Do you have any chronic medical problems?" (e g , asthma, heart or lung disease, weak immune system, obesity, etc )      Obesity, hx of asthma and sleep apnea  10  VACCINE: "Have you had the COVID-19 vaccine?" If Yes, ask: "Which one, how many shots, when did you get it?"        Moderna  11  BOOSTER: "Have you received your COVID-19 booster?" If Yes, ask: "Which one and when did you get it?"        Didn't get  12  PREGNANCY: "Is there any chance you are pregnant?" "When was your last menstrual period?"        N/A  13   OTHER SYMPTOMS: "Do you have any other symptoms?"  (e g , chills, fatigue, headache, loss of smell or taste, muscle pain, sore throat)        Chills  14   O2 SATURATION MONITOR:  "Do you use an oxygen saturation monitor (pulse oximeter) at home?" If Yes, ask "What is your reading (oxygen level) today?" "What is your usual oxygen saturation reading?" (e g , 95%)        HR 78 Sp02 99%    Protocols used: CORONAVIRUS (COVID-19) DIAGNOSED OR SUSPECTED-ADULT-

## 2022-07-02 NOTE — TELEPHONE ENCOUNTER
Provided patient with home care advise; declined asking PCP for antiviral medications; patient stated he planned on fighting this "the natural way " Advised patient to call back with any further questions or any worsening symptoms  Patient mostly had questions about quarentine; advised to follow CDC quidelines

## 2022-07-02 NOTE — TELEPHONE ENCOUNTER
Regarding: Covid care advise  ----- Message from Allan Constantino sent at 7/2/2022  5:24 PM EDT -----  "I have tested positive for covid, and I have a sore throat and cough "

## 2022-07-21 ENCOUNTER — TELEPHONE (OUTPATIENT)
Dept: BARIATRICS | Facility: CLINIC | Age: 27
End: 2022-07-21

## 2022-07-27 ENCOUNTER — APPOINTMENT (EMERGENCY)
Dept: CT IMAGING | Facility: HOSPITAL | Age: 27
End: 2022-07-27

## 2022-07-27 ENCOUNTER — HOSPITAL ENCOUNTER (EMERGENCY)
Facility: HOSPITAL | Age: 27
Discharge: HOME | End: 2022-07-27
Attending: EMERGENCY MEDICINE
Payer: COMMERCIAL

## 2022-07-27 VITALS
OXYGEN SATURATION: 96 % | TEMPERATURE: 97.9 F | RESPIRATION RATE: 20 BRPM | DIASTOLIC BLOOD PRESSURE: 82 MMHG | SYSTOLIC BLOOD PRESSURE: 170 MMHG | HEART RATE: 86 BPM

## 2022-07-27 DIAGNOSIS — S06.0XAA CONCUSSION: Primary | ICD-10-CM

## 2022-07-27 PROCEDURE — 99283 EMERGENCY DEPT VISIT LOW MDM: CPT

## 2022-07-27 PROCEDURE — 72125 CT NECK SPINE W/O DYE: CPT

## 2022-07-27 PROCEDURE — 99282 EMERGENCY DEPT VISIT SF MDM: CPT | Performed by: EMERGENCY MEDICINE

## 2022-07-27 PROCEDURE — 70450 CT HEAD/BRAIN W/O DYE: CPT

## 2022-07-27 PROCEDURE — G1004 CDSM NDSC: HCPCS

## 2022-07-27 RX ORDER — ACETAMINOPHEN 325 MG/1
975 TABLET ORAL ONCE
Status: COMPLETED | OUTPATIENT
Start: 2022-07-27 | End: 2022-07-27

## 2022-07-27 RX ADMIN — ACETAMINOPHEN 975 MG: 325 TABLET ORAL at 16:20

## 2022-07-27 NOTE — Clinical Note
Alberto Perez was seen and treated in our emergency department on 7/27/2022  Occupational Medicine Follow Up Within 24 hours            Diagnosis:     Burak    He may return on this date: If you have any questions or concerns, please don't hesitate to call        Arnulfo Gerard MD    ______________________________           _______________          _______________  Hospital Representative                              Date                                Time

## 2022-07-27 NOTE — ED NOTES
Discharge instructions reviewed with patient by Dr Diana Montalvo  Pt ambulated to waiting room with family in negative distress   Steady gait noted     Sumit Samson RN  07/27/22 [id number]

## 2022-07-27 NOTE — ED PROVIDER NOTES
History  Chief Complaint   Patient presents with    Head Injury     Pt was working yesterday and an approx  80 lb filing cabinet fell on his head  Pt went to urgent care and had a nose bleed so was send to ED  No LOC  No blood thinners or ASA  Pt only c/o headache  History provided by:  Patient   used: No      Patient is a 70-year-old male presenting to emergency department after head injury yesterday  While at work and 80 lb cabinet fell hitting him in top of his head  No LOC  Not on blood thinners or aspirin  Has a headache  Today was sent to urgent care for occupational medicine where he had a nose bleed and was sent to the ER  He is also having neck pain  No weakness numbness or tingling  MDM will do CT head and CT C-spine, Tylenol for pain    Patient with normal CT scans  Outpatient follow-up  Has Occupational Medicine for follow-up  Will refer to concussion Clinic      Prior to Admission Medications   Prescriptions Last Dose Informant Patient Reported? Taking?    TiZANidine (ZANAFLEX) 4 MG capsule   No No   Sig: Take 1 capsule (4 mg total) by mouth 3 (three) times a day as needed for muscle spasms   albuterol (Ventolin HFA) 90 mcg/act inhaler   No No   Sig: Inhale 2 puffs every 4 (four) hours as needed for wheezing   Patient not taking: Reported on 6/20/2022   ketorolac (TORADOL) 10 mg tablet   No No   Sig: Take 1 tablet (10 mg total) by mouth every 8 (eight) hours as needed for moderate pain (migraine/headache)   sertraline (Zoloft) 50 mg tablet   No No   Sig: Take 1 tablet (50 mg total) by mouth daily   topiramate (TOPAMAX) 50 MG tablet   No No   Sig: Take 1 tablet (50 mg total) by mouth 2 (two) times a day   Patient not taking: Reported on 6/20/2022      Facility-Administered Medications: None       Past Medical History:   Diagnosis Date    Ankle fracture     right    Anxiety 09/27/2020    Apnea 04/28/2020    Class 3 severe obesity due to excess calories without serious comorbidity with body mass index (BMI) of 45 0 to 49 9 in adult [facility]) 2020    Daytime somnolence 2020    Depression     Fall 4214-8766    "fell off of a roof"    Mild intermittent asthma     Nonintractable headache 2020    Poor short term memory 2020    Sleep apnea     Snoring 2020       Past Surgical History:   Procedure Laterality Date    TOOTH EXTRACTION         Family History   Problem Relation Age of Onset    Diabetes Mother     Breast cancer Mother     Diabetes Father     Hypertension Father     Diabetes Brother     Hypertension Brother     Hypertension Maternal Grandfather     Diabetes Maternal Grandfather     Thyroid disease Maternal Grandfather     Diabetes Paternal Grandmother     Alzheimer's disease Paternal Grandfather     Thyroid disease Maternal Uncle     Alzheimer's disease Paternal Aunt      I have reviewed and agree with the history as documented  E-Cigarette/Vaping    E-Cigarette Use Never User      E-Cigarette/Vaping Substances    Nicotine No     THC No     CBD No     Flavoring No     Other No     Unknown No      Social History     Tobacco Use    Smoking status: Former Smoker     Packs/day: 0 50     Types: Cigarettes     Quit date: 2018     Years since quittin 1    Smokeless tobacco: Former User     Types: Chew   Vaping Use    Vaping Use: Never used   Substance Use Topics    Alcohol use: Yes     Alcohol/week: 3 0 standard drinks     Types: 3 Cans of beer per week     Comment: socially    Drug use: No       Review of Systems   Constitutional: Negative for chills, diaphoresis and fever  HENT: Negative for congestion and sore throat  Respiratory: Negative for cough, shortness of breath, wheezing and stridor  Cardiovascular: Negative for chest pain, palpitations and leg swelling  Gastrointestinal: Negative for abdominal pain, blood in stool, diarrhea, nausea and vomiting     Genitourinary: Negative for dysuria, frequency and urgency  Musculoskeletal: Positive for neck pain  Negative for neck stiffness  Skin: Negative for pallor and rash  Neurological: Positive for headaches  Negative for dizziness, syncope, weakness and light-headedness  All other systems reviewed and are negative  Physical Exam  Physical Exam  Vitals reviewed  Constitutional:       Appearance: Normal appearance  He is well-developed  HENT:      Head: Normocephalic and atraumatic  Nose: Nose normal       Comments: No bleeding noted  No septal hematoma  Eyes:      Extraocular Movements: Extraocular movements intact  Pupils: Pupils are equal, round, and reactive to light  Cardiovascular:      Rate and Rhythm: Normal rate and regular rhythm  Heart sounds: Normal heart sounds  Pulmonary:      Effort: Pulmonary effort is normal  No respiratory distress  Breath sounds: Normal breath sounds  Abdominal:      General: Bowel sounds are normal       Palpations: Abdomen is soft  Tenderness: There is no abdominal tenderness  Musculoskeletal:         General: No swelling or tenderness  Normal range of motion  Cervical back: Normal range of motion and neck supple  Comments: C-spine tenderness, no T L-spine tenderness   Skin:     General: Skin is warm and dry  Capillary Refill: Capillary refill takes less than 2 seconds  Neurological:      General: No focal deficit present  Mental Status: He is alert and oriented to person, place, and time  Cranial Nerves: No cranial nerve deficit  Sensory: No sensory deficit  Motor: No weakness        Coordination: Coordination normal       Gait: Gait normal          Vital Signs  ED Triage Vitals   Temperature Pulse Respirations Blood Pressure SpO2   07/27/22 1538 07/27/22 1537 07/27/22 1537 07/27/22 1537 07/27/22 1537   97 9 °F (36 6 °C) 86 20 170/82 96 %      Temp Source Heart Rate Source Patient Position - Orthostatic VS BP Location FiO2 (%)   07/27/22 1538 07/27/22 1537 -- -- --   Oral Monitor         Pain Score       07/27/22 1537       3           Vitals:    07/27/22 1537   BP: 170/82   Pulse: 86         Visual Acuity  Visual Acuity    Flowsheet Row Most Recent Value   L Pupil Size (mm) 3   R Pupil Size (mm) 3          ED Medications  Medications   acetaminophen (TYLENOL) tablet 975 mg (975 mg Oral Given 7/27/22 1620)       Diagnostic Studies  Results Reviewed     None                 CT head without contrast   Final Result by Kassidy Gonzalez MD (07/27 1657)      No acute intracranial abnormality  Workstation performed: [id number]         CT cervical spine without contrast   Final Result by Kassidy Gonzalez MD (07/27 1655)      No cervical spine fracture or traumatic malalignment  Workstation performed: [id number]                    Procedures  Procedures         ED Course                                             MDM    Disposition  Final diagnoses:   Concussion     Time reflects when diagnosis was documented in both MDM as applicable and the Disposition within this note     Time User Action Codes Description Comment    7/27/2022  5:16 PM Lolly Arredondo [[id number]] Concussion       ED Disposition     ED Disposition   Discharge    Condition   Stable    Date/Time   Wed Jul 27, 2022  5:16 PM    Comment   Zaire Ya discharge to home/self care                 Follow-up Information     Follow up With Specialties Details Why Contact Info Additional Information    Narda Bynum, [address] [place], Internal Medicine, Nurse Practitioner In 3 days Re-evaluation [address] [place] [id number]       [facility] Emergency Department Emergency Medicine  As needed, If symptoms worsen [address] [address] [facility] Emergency Department, [address], [place], [place], [address]    Worker's comp  Schedule an appointment as soon as possible for a visit  Please follow-up as planned            Discharge Medication List as of 7/27/2022  5:18 PM      CONTINUE these medications which have NOT CHANGED    Details   albuterol (Ventolin HFA) 90 mcg/act inhaler Inhale 2 puffs every 4 (four) hours as needed for wheezing, Starting Tue 3/29/2022, Normal      ketorolac (TORADOL) 10 mg tablet Take 1 tablet (10 mg total) by mouth every 8 (eight) hours as needed for moderate pain (migraine/headache), Starting u 4/8/2021, Normal      sertraline (Zoloft) 50 mg tablet Take 1 tablet (50 mg total) by mouth daily, Starting Wed 12/1/2021, Normal      TiZANidine (ZANAFLEX) 4 MG capsule Take 1 capsule (4 mg total) by mouth 3 (three) times a day as needed for muscle spasms, Starting Tue 3/29/2022, Normal      topiramate (TOPAMAX) 50 MG tablet Take 1 tablet (50 mg total) by mouth 2 (two) times a day, Starting Wed 4/21/2021, Normal                 PDMP Review     None          ED Provider  Electronically Signed by           Allan Hand MD  07/27/22 1953

## 2022-07-29 ENCOUNTER — TELEPHONE (OUTPATIENT)
Dept: URGENT CARE | Age: 27
End: 2022-07-29

## 2022-08-18 ENCOUNTER — TELEPHONE (OUTPATIENT)
Dept: BARIATRICS | Facility: CLINIC | Age: 27
End: 2022-08-18

## 2022-08-18 NOTE — TELEPHONE ENCOUNTER
Pt no-showed for their consultation today 8/18 w  Dr Joselito Mccullough  Called and LVM for to give us a call to see if their still interested in bariatric sx

## 2022-09-29 ENCOUNTER — TELEMEDICINE (OUTPATIENT)
Dept: FAMILY MEDICINE CLINIC | Facility: CLINIC | Age: 27
End: 2022-09-29
Payer: COMMERCIAL

## 2022-09-29 DIAGNOSIS — J02.9 PHARYNGITIS, UNSPECIFIED ETIOLOGY: Primary | ICD-10-CM

## 2022-09-29 PROCEDURE — 99213 OFFICE O/P EST LOW 20 MIN: CPT | Performed by: FAMILY MEDICINE

## 2022-09-29 RX ORDER — AMOXICILLIN 500 MG/1
500 CAPSULE ORAL EVERY 12 HOURS SCHEDULED
Qty: 20 CAPSULE | Refills: 0 | Status: SHIPPED | OUTPATIENT
Start: 2022-09-30 | End: 2022-10-10

## 2022-09-29 NOTE — PROGRESS NOTES
Virtual Regular Visit    Verification of patient location:    Patient is located in the following state in which I hold an active license [place]      Assessment/Plan:    Problem List Items Addressed This Visit        Digestive    Pharyngitis - Primary     Sore throat, adenopathy, and fever x 1 day  Well appearing   No sick contacts or travels   Vaccinated and had COVID within the last 90 days   Exam limited due to virtual platform  Recommend supportive care for now as I feel this is likely viral    Salt water gargles and tea with honey  Monitor fever curve and call if persistently elevated  Call if he develops more symptoms as this could suggest COVID infection   If symptoms persist, start amoxicillin x 10 days for suspected strept   Call precautions            Relevant Medications    amoxicillin (AMOXIL) 500 mg capsule (Start on 9/30/2022)               Reason for visit is   Chief Complaint   Patient presents with    Sore Throat     Patient being seen for sore throat x 1 day    Fever     Patient being seen for fever x 1 day        Encounter provider Lili Zeng MD    Provider located at [place] [address] [place] [id number]      Recent Visits  No visits were found meeting these conditions  Showing recent visits within past 7 days and meeting all other requirements  Today's Visits  Date Type Provider Dept   09/29/22 Telemedicine Lili Zeng MD [facility] today's visits and meeting all other requirements  Future Appointments  No visits were found meeting these conditions  Showing future appointments within next 150 days and meeting all other requirements       The patient was identified by name and date of birth  Tania Kari was informed that this is a telemedicine visit and that the visit is being conducted through [address] and patient was informed this is a secure, HIPAA-complaint platform  He agrees to proceed     My office door was closed  No one else was in the room  He acknowledged consent and understanding of privacy and security of the video platform  The patient has agreed to participate and understands they can discontinue the visit at any time  Patient is aware this is a billable service  Subjective  Rissa Smith is a 32 y o  male     Seen virtually with sore throat, lymphadenopathy, chills, and fever  Started 1 days ago  Covid vaccinated and had COVID in within the last 90 days  Wife noticed white patches in the back of the throat  Denies cough, runny nose, chest tightness, ear pain, recent travels, or sick contacts  Hurts to swallow  TMAX 100 6  Also experiencing SOB and headaches but this is chronic   No known allergies        Past Medical History:   Diagnosis Date    Ankle fracture     right    Anxiety 09/27/2020    Apnea 04/28/2020    Class 3 severe obesity due to excess calories without serious comorbidity with body mass index (BMI) of 45 0 to 49 9 in adult [facility] 04/28/2020    Daytime somnolence 04/28/2020    Depression     Fall [id number]    "fell off of a roof"    Mild intermittent asthma     Nonintractable headache 04/28/2020    Poor short term memory 04/28/2020    Sleep apnea     Snoring 04/28/2020       Past Surgical History:   Procedure Laterality Date    TOOTH EXTRACTION         Current Outpatient Medications   Medication Sig Dispense Refill    [START ON 9/30/2022] amoxicillin (AMOXIL) 500 mg capsule Take 1 capsule (500 mg total) by mouth every 12 (twelve) hours for 10 days Do not start before September 30, 2022  20 capsule 0    albuterol (Ventolin HFA) 90 mcg/act inhaler Inhale 2 puffs every 4 (four) hours as needed for wheezing (Patient not taking: No sig reported) 6 7 g 1    ketorolac (TORADOL) 10 mg tablet Take 1 tablet (10 mg total) by mouth every 8 (eight) hours as needed for moderate pain (migraine/headache) (Patient not taking: Reported on 9/29/2022) 30 tablet 0     No current facility-administered medications for this visit  No Known Allergies    Review of Systems    Video Exam    Vitals:       Physical Exam  HENT:      Head: Normocephalic and atraumatic  Mouth/Throat:      Mouth: Mucous membranes are moist       Pharynx: Posterior oropharyngeal erythema present  No oropharyngeal exudate  Pulmonary:      Effort: Pulmonary effort is normal  No respiratory distress  Comments: No conversational dyspnea   Neurological:      Mental Status: He is alert and oriented to person, place, and time     Psychiatric:         Mood and Affect: Mood normal          Behavior: Behavior normal           I spent 12 minutes directly with the patient during this visit

## 2022-10-19 ENCOUNTER — TELEPHONE (OUTPATIENT)
Dept: FAMILY MEDICINE CLINIC | Facility: CLINIC | Age: 27
End: 2022-10-19

## 2022-10-19 NOTE — TELEPHONE ENCOUNTER
Pt's girlfriend called to say when he was getting the kids out of the car, he pulled his back  Wanted to know if he could get tizanidine sent to pharmacy below as that was prescribed in the past for his back issues  Will also need a work note        [id number]  [address] [facility] [address]  Phone: [phone] Fax: [phone]    Please advise if pt needs to be seen first

## 2022-10-20 DIAGNOSIS — M54.50 ACUTE BILATERAL LOW BACK PAIN WITHOUT SCIATICA: Primary | ICD-10-CM

## 2022-10-20 RX ORDER — TIZANIDINE HYDROCHLORIDE 4 MG/1
4 CAPSULE, GELATIN COATED ORAL 3 TIMES DAILY PRN
Qty: 30 CAPSULE | Refills: 0 | Status: ON HOLD | OUTPATIENT
Start: 2022-10-20 | End: 2023-04-04 | Stop reason: CLARIF

## 2022-11-02 NOTE — ASSESSMENT & PLAN NOTE
Sore throat, adenopathy, and fever x 1 day  Well appearing   No sick contacts or travels   Vaccinated and had COVID within the last 90 days   Exam limited due to virtual platform  Recommend supportive care for now as I feel this is likely viral    Salt water gargles and tea with honey     Monitor fever curve and call if persistently elevated  Call if he develops more symptoms as this could suggest COVID infection   If symptoms persist, start amoxicillin x 10 days for suspected strept   Call precautions The patient is Stable - Low risk of patient condition declining or worsening    Shift Goals  Clinical Goals: hemodynamic stability  Patient Goals: pain management  Family Goals: FRANCISCO    Progress made toward(s) clinical / shift goals:      Problem: Knowledge Deficit - Standard  Goal: Patient and family/care givers will demonstrate understanding of plan of care, disease process/condition, diagnostic tests and medications  Outcome: Progressing     Problem: Hemodynamics  Goal: Patient's hemodynamics, fluid balance and neurologic status will be stable or improve  Outcome: Progressing     Problem: Respiratory  Goal: Patient will achieve/maintain optimum respiratory ventilation and gas exchange  Outcome: Progressing     Problem: Physical Regulation  Goal: Diagnostic test results will improve  Outcome: Progressing     Problem: Pain - Standard  Goal: Alleviation of pain or a reduction in pain to the patient’s comfort goal  Outcome: Progressing     Problem: Skin Integrity  Goal: Skin integrity is maintained or improved  Outcome: Progressing     Problem: Fall Risk  Goal: Patient will remain free from falls  Outcome: Progressing     Problem: Depression  Goal: Patient and family/caregiver will verbalize accurate information about at least two of the possible causes of depression, three-four of the signs and symptoms of depression  Outcome: Progressing       Patient is not progressing towards the following goals:

## 2022-12-07 ENCOUNTER — OFFICE VISIT (OUTPATIENT)
Dept: GASTROENTEROLOGY | Facility: CLINIC | Age: 27
End: 2022-12-07

## 2022-12-07 VITALS
TEMPERATURE: 99.2 F | HEIGHT: 70 IN | DIASTOLIC BLOOD PRESSURE: 80 MMHG | WEIGHT: 315 LBS | SYSTOLIC BLOOD PRESSURE: 116 MMHG | BODY MASS INDEX: 45.1 KG/M2

## 2022-12-07 DIAGNOSIS — K64.9 HEMORRHOIDS, UNSPECIFIED HEMORRHOID TYPE: Primary | ICD-10-CM

## 2022-12-07 RX ORDER — HYDROCORTISONE 25 MG/G
CREAM TOPICAL 2 TIMES DAILY
Qty: 28 G | Refills: 0 | Status: SHIPPED | OUTPATIENT
Start: 2022-12-07

## 2022-12-07 NOTE — PROGRESS NOTES
[facility] - Outpatient Consultation  Salome Jackson 32 y o  male MRN: [id number]  Encounter: [id number]          ASSESSMENT AND PLAN:    Salome Jackson is a 32 y o  male with PMH of obesity presenting for concern for hemorrhoid    Hemorrhoid  - On visual inspection does have a small external hemorrhoid  - Counseled on use of preparation H, decreasing intensity of weight lifting in the short term, and possibility of blood in stool   - Prescribed preparation H w hydrocortisone    1  Hemorrhoids, unspecified hemorrhoid type      No orders of the defined types were placed in this encounter     ______________________________________________________________________    HPI:    Salome Jackson is a 32 y o  male with PMH of obesity presenting for concern for hemorrhoid  3 days ago noted nickel sized lump on outside of rectum  Bothered him initially, now does not  Not painful  No pain with defecation, no blood in stool  No abdominal pain  Used to have hard stools with blood in them 3 years ago, that has since resolved - last episode was years ago  Bowel movements 2-3 x day, usually formed and soft  Frequency of weight lifting has also increased in the past month, now doing it 4 days a week instead of 2-3  REVIEW OF SYSTEMS:    CONSTITUTIONAL: Denies any fever, chills, rigors, and weight loss  HEENT: No earache or tinnitus  Denies hearing loss or visual disturbances  CARDIOVASCULAR: No chest pain or palpitations  RESPIRATORY: Denies any cough, hemoptysis, shortness of breath or dyspnea on exertion  GASTROINTESTINAL: As noted in the History of Present Illness  GENITOURINARY: No problems with urination  Denies any hematuria or dysuria  NEUROLOGIC: No dizziness or vertigo, denies headaches  MUSCULOSKELETAL: Denies any muscle or joint pain  SKIN: Denies skin rashes or itching  ENDOCRINE: Denies excessive thirst  Denies intolerance to heat or cold    PSYCHOSOCIAL: Denies depression or anxiety  Denies any recent memory loss  Historical Information   Past Medical History:   Diagnosis Date   • Ankle fracture     right   • Anxiety 2020   • Apnea 2020   • Class 3 severe obesity due to excess calories without serious comorbidity with body mass index (BMI) of 45 0 to 49 9 in adult ([facility] [address] ) 2020   • Daytime somnolence 2020   • Depression    • Fall 5017-2881    "fell off of a roof"   • Mild intermittent asthma    • Nonintractable headache 2020   • Poor short term memory 2020   • Sleep apnea    • Snoring 2020     Past Surgical History:   Procedure Laterality Date   • TOOTH EXTRACTION       Social History   Social History     Substance and Sexual Activity   Alcohol Use Yes   • Alcohol/week: 3 0 standard drinks   • Types: 3 Cans of beer per week    Comment: socially     Social History     Substance and Sexual Activity   Drug Use No     Social History     Tobacco Use   Smoking Status Former   • Packs/day: 0 50   • Types: Cigarettes   • Quit date: 2018   • Years since quittin 5   Smokeless Tobacco Former   • Types: [de-identified]     Family History   Problem Relation Age of Onset   • Diabetes Mother    • Breast cancer Mother    • Diabetes Father    • Hypertension Father    • Diabetes Brother    • Hypertension Brother    • Hypertension Maternal Grandfather    • Diabetes Maternal Grandfather    • Thyroid disease Maternal Grandfather    • Diabetes Paternal Grandmother    • Alzheimer's disease Paternal Grandfather    • Thyroid disease Maternal Uncle    • Alzheimer's disease Paternal Aunt        Meds/Allergies       Current Outpatient Medications:   •  TiZANidine (ZANAFLEX) 4 MG capsule  •  albuterol (Ventolin HFA) 90 mcg/act inhaler  •  ketorolac (TORADOL) 10 mg tablet    No Known Allergies        Objective     Blood pressure 116/80, temperature 99 2 °F (37 3 °C), temperature source Tympanic, height 5' 10" (1 778 m), weight (!) 174 kg (384 lb)     Body mass index is 55 1 kg/m²  Wt Readings from Last 3 Encounters:   12/07/22 (!) 174 kg (384 lb)   06/20/22 (!) 191 kg (420 lb 8 oz)   12/01/21 (!) 186 kg (409 lb 12 8 oz)        PHYSICAL EXAM:      General Appearance:   Alert, cooperative, no distress   HEENT:   Normocephalic, atraumatic, anicteric  Neck:  Supple, symmetrical, trachea midline   Lungs:   Clear to auscultation bilaterally; no rales, rhonchi or wheezing; respirations unlabored    Heart[de-identified]   Regular rate and rhythm; no murmur, rub, or gallop     Abdomen:   Soft, non-tender, non-distended; normal bowel sounds; no masses, no organomegaly    Genitalia:   Deferred    Rectal:   Deferred    Extremities:  No cyanosis, clubbing or edema    Pulses:  2+ and symmetric    Skin:  No jaundice, rashes, or lesions; on visual examination adjacent to rectum, has small external hemorrhoid on L side   Lymph nodes:  No palpable cervical lymphadenopathy        Lab Results:         Lab Units 07/13/21  0955 01/21/21  0937   SODIUM mmol/L 140 137   POTASSIUM mmol/L 4 2 4 4   CHLORIDE mmol/L 102 103   CO2 mmol/L 30 30   BUN mg/dL 10 12   CREATININE mg/dL 0 76 0 77   GLUCOSE RANDOM mg/dL 89  --    CALCIUM mg/dL 9 3 9 1            Lab Units 07/13/21  0955 01/21/21  0937   TOTAL PROTEIN g/dL 8 3* 7 3   ALBUMIN g/dL 4 2 3 9   TOTAL BILIRUBIN mg/dL 0 52 0 49   AST U/L 24 29   ALT U/L 62 67   ALK PHOS U/L 78 60           Lab Units 07/13/21  0955 01/21/21  0937   WBC Thousand/uL 6 18 6 17   HEMOGLOBIN g/dL 16 4 16 3   HEMATOCRIT % 52 2* 50 3*   PLATELETS Thousands/uL 224 216   MCV fL 93 91   MCH pg 29 2 29 4   MCHC g/dL 31 4 32 4   RDW % 13 3 12 6   MPV fL 9 3 9 5   NRBC AUTO /100 WBCs 0 0   NEUTROS PCT % 64 55   IMMATURE GRANULOCYTES % % 0 0   LYMPHS PCT % 26 35   MONOS PCT % 7 7   EOS PCT % 3 3   BASOS PCT % 0 0   NEUTROS ABS Thousands/µL 3 97 3 34   IMMATURE GRANULOCYES ABS Thousand/uL 0 01 0 01   LYMPHS ABS Thousands/µL 1 59 2 16   MONOS ABS Thousand/µL 0 41 0 44   EOS ABS Thousand/µL 0  18 0 20   BASOS ABS Thousands/µL 0 02 0 02       No results for input(s): IRON, TRANSFERRIN, TRANSFERSAT, FERRITIN, RETIC in the last 11148 hours  No results found for: CRP    Lab Results   Component Value Date    [id number] 1 849 01/21/2021       Radiology Results:   No results found      Karon Charlton MD  PGY-4 Gastroenterology Fellow  12/7/2022 10:46 AM

## 2022-12-13 ENCOUNTER — TELEPHONE (OUTPATIENT)
Dept: FAMILY MEDICINE CLINIC | Facility: CLINIC | Age: 27
End: 2022-12-13

## 2022-12-13 NOTE — TELEPHONE ENCOUNTER
Pt's significant other called back to see if this can be done today as he has to report tomorrow for jury duty    Please advise

## 2022-12-13 NOTE — TELEPHONE ENCOUNTER
Chanda significant other called and stated that he got a jury duty notice and wanted to know If we can write a letter stating that he is on anxiety medication and muscle relaxer medication so he can be excused   Please advise if this is ok to write

## 2022-12-13 NOTE — TELEPHONE ENCOUNTER
Patient called back regarding below message  He is requesting a letter be written for jury duty absence due to anxiety  Also patient is requesting a call back regarding making a f/u appointment

## 2023-02-15 ENCOUNTER — OFFICE VISIT (OUTPATIENT)
Dept: URGENT CARE | Facility: MEDICAL CENTER | Age: 28
End: 2023-02-15

## 2023-02-15 VITALS
BODY MASS INDEX: 45.1 KG/M2 | HEART RATE: 88 BPM | HEIGHT: 70 IN | RESPIRATION RATE: 18 BRPM | WEIGHT: 315 LBS | OXYGEN SATURATION: 97 % | DIASTOLIC BLOOD PRESSURE: 71 MMHG | TEMPERATURE: 98.2 F | SYSTOLIC BLOOD PRESSURE: 127 MMHG

## 2023-02-15 DIAGNOSIS — L03.012 PARONYCHIA OF LEFT MIDDLE FINGER: Primary | ICD-10-CM

## 2023-02-15 RX ORDER — CEPHALEXIN 500 MG/1
500 CAPSULE ORAL EVERY 12 HOURS SCHEDULED
Qty: 10 CAPSULE | Refills: 0 | Status: SHIPPED | OUTPATIENT
Start: 2023-02-15 | End: 2023-02-20

## 2023-02-15 NOTE — PROGRESS NOTES
[id number] [facility]        NAME: Daisy Ford is a 32 y o  male  : 1995    MRN: [id number]  DATE: February 15, 2023  TIME: 11:24 AM    Assessment and Plan   Paronychia of left middle finger [L03 012]  1  Paronychia of left middle finger  cephalexin (KEFLEX) 500 mg capsule            Patient Instructions       Follow up with PCP in 3-5 days  Proceed to  ER if symptoms worsen  Chief Complaint     Chief Complaint   Patient presents with   • Finger Pain     Left third digit swelling, redness, pain around the nail bed    Denies any trauma or known injury          History of Present Illness       33 yo male with c/o left middle finger tip swelling, inflammation, with erythema, and purulent lesion developing over at the lateral aspect of the nail  Review of Systems   Review of Systems   Constitutional: Negative for fever  Respiratory: Negative for cough  Gastrointestinal: Negative for nausea  Skin: Positive for color change           Current Medications       Current Outpatient Medications:   •  cephalexin (KEFLEX) 500 mg capsule, Take 1 capsule (500 mg total) by mouth every 12 (twelve) hours for 5 days, Disp: 10 capsule, Rfl: 0  •  albuterol (Ventolin HFA) 90 mcg/act inhaler, Inhale 2 puffs every 4 (four) hours as needed for wheezing (Patient not taking: Reported on 2022), Disp: 6 7 g, Rfl: 1  •  hydrocortisone (ANUSOL-HC) 2 5 % rectal cream, Apply topically 2 (two) times a day, Disp: 28 g, Rfl: 0  •  ketorolac (TORADOL) 10 mg tablet, Take 1 tablet (10 mg total) by mouth every 8 (eight) hours as needed for moderate pain (migraine/headache) (Patient not taking: Reported on 2022), Disp: 30 tablet, Rfl: 0  •  TiZANidine (ZANAFLEX) 4 MG capsule, Take 1 capsule (4 mg total) by mouth 3 (three) times a day as needed for muscle spasms, Disp: 30 capsule, Rfl: 0    Current Allergies     Allergies as of 02/15/2023   • (No Known Allergies)            The following portions of the patient's history were reviewed and updated as appropriate: allergies, current medications, past family history, past medical history, past social history, past surgical history and problem list      Past Medical History:   Diagnosis Date   • Ankle fracture     right   • Anxiety 09/27/2020   • Apnea 04/28/2020   • Class 3 severe obesity due to excess calories without serious comorbidity with body mass index (BMI) of 45 0 to 49 9 in adult ([facility] [address] ) 04/28/2020   • Daytime somnolence 04/28/2020   • Depression    • Fall 4411-3056    "fell off of a roof"   • Mild intermittent asthma    • Nonintractable headache 04/28/2020   • Poor short term memory 04/28/2020   • Sleep apnea    • Snoring 04/28/2020       Past Surgical History:   Procedure Laterality Date   • TOOTH EXTRACTION         Family History   Problem Relation Age of Onset   • Diabetes Mother    • Breast cancer Mother    • Diabetes Father    • Hypertension Father    • Diabetes Brother    • Hypertension Brother    • Hypertension Maternal Grandfather    • Diabetes Maternal Grandfather    • Thyroid disease Maternal Grandfather    • Diabetes Paternal Grandmother    • Alzheimer's disease Paternal Grandfather    • Thyroid disease Maternal Uncle    • Alzheimer's disease Paternal Aunt          Medications have been verified  Objective   /71   Pulse 88   Temp 98 2 °F (36 8 °C) (Temporal)   Resp 18   Ht 5' 10" (1 778 m)   Wt (!) 174 kg (384 lb)   SpO2 97%   BMI 55 10 kg/m²        Physical Exam     Physical Exam  Vitals and nursing note reviewed  Constitutional:       General: He is not in acute distress  Appearance: Normal appearance  He is not ill-appearing  Eyes:      Extraocular Movements: Extraocular movements intact  Conjunctiva/sclera: Conjunctivae normal       Pupils: Pupils are equal, round, and reactive to light  Cardiovascular:      Rate and Rhythm: Normal rate and regular rhythm  Pulses: Normal pulses     Pulmonary:      Effort: Pulmonary effort is normal  No respiratory distress  Musculoskeletal:        Hands:       Comments: Left Hand Middle Finger: 3 mm Abscess middle lateral aspect of nail bed  Neurological:      Mental Status: He is alert  Incision and drain    Date/Time: 2/15/2023 11:22 AM  Performed by: Sendy Aquino PA-C  Authorized by: Sendy Aquino PA-C   Universal Protocol:  Consent: Verbal consent obtained  Consent given by: patient  Timeout called at: 2/15/2023 11:23 AM   Patient understanding: patient states understanding of the procedure being performed  Patient consent: the patient's understanding of the procedure matches consent given  Procedure consent: procedure consent matches procedure scheduled  Relevant documents: relevant documents present and verified  Site marked: the operative site was marked  Patient identity confirmed: verbally with patient      Patient location:  Clinic  Location:     Type:  Abscess    Size:  3mm    Location:  Upper extremity    Upper extremity location:  L long finger  Anesthesia (see MAR for exact dosages): Anesthesia method:  None  Procedure details:     Complexity:  Simple    Needle aspiration: no      Incision types:  Stab incision    Scalpel blade:  11    Approach:  Puncture    Incision depth:  Superficial    Wound management:  Irrigated with saline    Drainage:  Purulent    Drainage amount:  Scant    Wound treatment:  Wound left open    Packing materials:  None  Post-procedure details:     Patient tolerance of procedure:   Tolerated well, no immediate complications

## 2023-02-15 NOTE — PATIENT INSTRUCTIONS
Paronychia   WHAT YOU NEED TO KNOW:   Paronychia is an infection of your nail fold caused by bacteria or a fungus  The nail fold is the skin around your nail  Paronychia may happen suddenly and last for 6 weeks or longer  You may have paronychia on more than 1 finger or toe  DISCHARGE INSTRUCTIONS:   Medicines:   Td vaccine  is a booster shot used to help prevent tetanus and diphtheria  The Td booster may be given to adolescents and adults every 10 years or for certain wounds and injuries  Antibiotics: This medicine will help fight or prevent an infection  It may be given as a pill, cream, or ointment  Steroids: This medicine will help decrease inflammation  It may be given as a pill, cream, or ointment  Antifungal medicine: This medicine helps kill fungus that may be causing your infection  It may be given as a cream or ointment  NSAIDs:  These medicines decrease pain and swelling  NSAIDs are available without a doctor's order  Ask your healthcare provider which medicine is right for you  Ask how much to take and when to take it  Take as directed  NSAIDs can cause stomach bleeding and kidney problems if not taken correctly  Take your medicine as directed  Contact your healthcare provider if you think your medicine is not helping or if you have side effects  Tell him of her if you are allergic to any medicine  Keep a list of the medicines, vitamins, and herbs you take  Include the amounts, and when and why you take them  Bring the list or the pill bottles to follow-up visits  Carry your medicine list with you in case of an emergency  Follow up with your doctor as directed:  Write down your questions so you remember to ask them during your visits  Self-care:   Soak your nail:  Soak your nail in a mixture of equal parts vinegar and water 3 or 4 times each day  This will help decrease inflammation  Apply a warm compress:  Soak a washcloth in warm water and place it on your nail   This will help decrease inflammation  Elevate:  Raise your nail above the level of your heart as often as you can  This will help decrease swelling and pain  Prop your nail on pillows or blankets to keep it elevated comfortably  Use lotion:  Apply lotion after you wash your hands  This will prevent your skin from becoming too dry  Prevent paronychia:   Avoid chemicals and allergens that may harm your skin and nails  This includes soaps, laundry detergents, and nail products  Keep your nails clean and dry  Avoid soaking your nails in water  Use cotton-lined rubber gloves or wear 2 rubber gloves if you work with food or water  The gloves will help protect your nail folds  Keep your nails short  Do not bite your nails, pick at your hangnails, suck your fingers, or wear fake nails  Bring your own nail tools when you go to the nail salon  Contact your healthcare provider if:   Your nail becomes loose, deformed, or falls off  You have a large abscess on your nail  You have questions or concerns about your condition or care  Return to the emergency department if:   You have severe nail pain  The inflammation spreads to your hand or arm  © Copyright [employer] 2022 Information is for End User's use only and may not be sold, redistributed or otherwise used for commercial purposes  All illustrations and images included in CareNotes® are the copyrighted property of [employer]  or Breana Ruvalcaba  The above information is an  only  It is not intended as medical advice for individual conditions or treatments  Talk to your doctor, nurse or pharmacist before following any medical regimen to see if it is safe and effective for you

## 2023-03-28 DIAGNOSIS — R05.1 ACUTE COUGH: Primary | ICD-10-CM

## 2023-03-28 RX ORDER — BENZONATATE 200 MG/1
200 CAPSULE ORAL 3 TIMES DAILY PRN
Qty: 20 CAPSULE | Refills: 0 | Status: ON HOLD | OUTPATIENT
Start: 2023-03-28 | End: 2023-04-04 | Stop reason: CLARIF

## 2023-03-28 NOTE — PROGRESS NOTES
COVID-19 Outpatient Progress Note    Assessment/Plan:    Problem List Items Addressed This Visit    None  Visit Diagnoses     Acute cough    -  Primary    Relevant Medications    benzonatate (TESSALON) 200 MG capsule    Other Relevant Orders    Covid/Flu- Lab Collect         COVID-19 Plan    Encounter provider: PRIMO Chambers     Provider located at: [place]  [address] [facility] [address]     Recent Visits  No visits were found meeting these conditions. Showing recent visits within past 7 days and meeting all other requirements  Future Appointments  No visits were found meeting these conditions. Showing future appointments within next 150 days and meeting all other requirements     COVID-19 HPI  Lab Results   Component Value Date    SARSCOV2 Negative 04/12/2021    SARSCOV2 Not Detected 12/29/2020    [address] [facility] [facility] Not Detected 09/19/2021       Review of Systems  Current Outpatient Medications on File Prior to Visit   Medication Sig   • albuterol (Ventolin HFA) 90 mcg/act inhaler Inhale 2 puffs every 4 (four) hours as needed for wheezing (Patient not taking: Reported on 6/20/2022)   • hydrocortisone (ANUSOL-HC) 2.5 % rectal cream Apply topically 2 (two) times a day   • ketorolac (TORADOL) 10 mg tablet Take 1 tablet (10 mg total) by mouth every 8 (eight) hours as needed for moderate pain (migraine/headache) (Patient not taking: Reported on 9/29/2022)   • TiZANidine (ZANAFLEX) 4 MG capsule Take 1 capsule (4 mg total) by mouth 3 (three) times a day as needed for muscle spasms       Objective: There were no vitals taken for this visit.      Physical Exam  Kaleigh Clements

## 2023-04-01 ENCOUNTER — HOSPITAL ENCOUNTER (EMERGENCY)
Facility: HOSPITAL | Age: 28
Discharge: HOME | End: 2023-04-01
Attending: EMERGENCY MEDICINE | Admitting: EMERGENCY MEDICINE

## 2023-04-01 VITALS
TEMPERATURE: 98.1 F | WEIGHT: 315 LBS | SYSTOLIC BLOOD PRESSURE: 168 MMHG | OXYGEN SATURATION: 95 % | BODY MASS INDEX: 56.53 KG/M2 | DIASTOLIC BLOOD PRESSURE: 71 MMHG | HEART RATE: 91 BPM | RESPIRATION RATE: 18 BRPM

## 2023-04-01 DIAGNOSIS — K04.7 DENTAL INFECTION: Primary | ICD-10-CM

## 2023-04-01 DIAGNOSIS — K08.89 TOOTH PAIN: ICD-10-CM

## 2023-04-01 DIAGNOSIS — K05.10 GINGIVITIS: ICD-10-CM

## 2023-04-01 RX ORDER — PENICILLIN V POTASSIUM 500 MG/1
500 TABLET ORAL 4 TIMES DAILY
Qty: 28 TABLET | Refills: 0 | Status: ON HOLD | OUTPATIENT
Start: 2023-04-02 | End: 2023-04-04 | Stop reason: CLARIF

## 2023-04-01 RX ORDER — OXYCODONE HYDROCHLORIDE 5 MG/1
5 TABLET ORAL ONCE
Status: COMPLETED | OUTPATIENT
Start: 2023-04-01 | End: 2023-04-01

## 2023-04-01 RX ORDER — OXYCODONE HYDROCHLORIDE 5 MG/1
5 TABLET ORAL EVERY 6 HOURS PRN
Qty: 4 TABLET | Refills: 0 | Status: SHIPPED | OUTPATIENT
Start: 2023-04-01

## 2023-04-01 RX ORDER — ACETAMINOPHEN 325 MG/1
975 TABLET ORAL ONCE
Status: COMPLETED | OUTPATIENT
Start: 2023-04-01 | End: 2023-04-01

## 2023-04-01 RX ORDER — IBUPROFEN 600 MG/1
600 TABLET ORAL ONCE
Status: COMPLETED | OUTPATIENT
Start: 2023-04-01 | End: 2023-04-01

## 2023-04-01 RX ORDER — PENICILLIN V POTASSIUM 250 MG/1
500 TABLET ORAL ONCE
Status: COMPLETED | OUTPATIENT
Start: 2023-04-01 | End: 2023-04-01

## 2023-04-01 RX ADMIN — ACETAMINOPHEN 975 MG: 325 TABLET ORAL at 22:19

## 2023-04-01 RX ADMIN — PENICILLIN V POTASSIUM 500 MG: 250 TABLET, FILM COATED ORAL at 22:19

## 2023-04-01 RX ADMIN — IBUPROFEN 600 MG: 600 TABLET, FILM COATED ORAL at 22:19

## 2023-04-01 RX ADMIN — OXYCODONE HYDROCHLORIDE 5 MG: 5 TABLET ORAL at 22:19

## 2023-04-02 NOTE — ED PROVIDER NOTES
History  Chief Complaint   Patient presents with   • Dental Pain     Patient reporting upper right dental pain since yesterday  Reports taking oragel and ibuprofen at home w/o relief  Last medicated 4 hours pta  Patient is a 15-year-old male presenting for evaluation of 4 days of upper tooth pain  Patient notes he developed right upper tooth pain approximately 4 days ago that has been worsening despite using Orajel and ibuprofen at home  He notes having a root canal in the past and is concerned he may need 1 again  He notes his last dentist retired and he has not seen the dentist in 4 years  He has an appointment scheduled for Monday, however he cannot bear the pain so he presents for evaluation and concern for need of antibiotics  He notes right upper tooth pain but denies fevers, chills, headaches, facial swelling, sore throat, painful swallowing, and difficulty swallowing  History provided by:  Patient   used: No        Prior to Admission Medications   Prescriptions Last Dose Informant Patient Reported? Taking?    TiZANidine (ZANAFLEX) 4 MG capsule   No No   Sig: Take 1 capsule (4 mg total) by mouth 3 (three) times a day as needed for muscle spasms   albuterol (Ventolin HFA) 90 mcg/act inhaler   No No   Sig: Inhale 2 puffs every 4 (four) hours as needed for wheezing   Patient not taking: Reported on 6/20/2022   benzonatate (TESSALON) 200 MG capsule   No No   Sig: Take 1 capsule (200 mg total) by mouth 3 (three) times a day as needed for cough   hydrocortisone (ANUSOL-HC) 2 5 % rectal cream   No No   Sig: Apply topically 2 (two) times a day   ketorolac (TORADOL) 10 mg tablet   No No   Sig: Take 1 tablet (10 mg total) by mouth every 8 (eight) hours as needed for moderate pain (migraine/headache)   Patient not taking: Reported on 9/29/2022      Facility-Administered Medications: None       Past Medical History:   Diagnosis Date   • Ankle fracture     right   • Anxiety 09/27/2020   • "Apnea 2020   • Class 3 severe obesity due to excess calories without serious comorbidity with body mass index (BMI) of 45 0 to 49 9 in adult ([facility] [address] ) 2020   • Daytime somnolence 2020   • Depression    • Fall [id number]    \"fell off of a roof\"   • Mild intermittent asthma    • Nonintractable headache 2020   • Poor short term memory 2020   • Sleep apnea    • Snoring 2020       Past Surgical History:   Procedure Laterality Date   • TOOTH EXTRACTION         Family History   Problem Relation Age of Onset   • Diabetes Mother    • Breast cancer Mother    • Diabetes Father    • Hypertension Father    • Diabetes Brother    • Hypertension Brother    • Hypertension Maternal Grandfather    • Diabetes Maternal Grandfather    • Thyroid disease Maternal Grandfather    • Diabetes Paternal Grandmother    • Alzheimer's disease Paternal Grandfather    • Thyroid disease Maternal Uncle    • Alzheimer's disease Paternal Aunt      I have reviewed and agree with the history as documented  E-Cigarette/Vaping   • E-Cigarette Use Never User      E-Cigarette/Vaping Substances   • Nicotine No    • THC No    • CBD No    • Flavoring No    • Other No    • Unknown No      Social History     Tobacco Use   • Smoking status: Former     Packs/day: 0 50     Types: Cigarettes     Quit date: 2018     Years since quittin 8   • Smokeless tobacco: Former     Types: Chew   Vaping Use   • Vaping Use: Never used   Substance Use Topics   • Alcohol use: Yes     Alcohol/week: 3 0 standard drinks     Types: 3 Cans of beer per week     Comment: socially   • Drug use: No       Review of Systems   Constitutional: Negative for chills and fever  HENT: Positive for dental problem (Right upper tooth)  Negative for congestion, drooling, facial swelling, sinus pressure, sinus pain, sore throat, trouble swallowing and voice change  Respiratory: Negative for shortness of breath  Cardiovascular: Negative for chest pain   " Gastrointestinal: Negative for abdominal pain, nausea and vomiting  Skin: Negative for color change, rash and wound  Allergic/Immunologic: Negative for immunocompromised state  Neurological: Negative for dizziness, weakness, light-headedness and headaches  All other systems reviewed and are negative  Physical Exam  Physical Exam  Vitals and nursing note reviewed  Constitutional:       General: He is in acute distress (Evidencing moderate distress)  Appearance: Normal appearance  He is obese  He is not ill-appearing, toxic-appearing or diaphoretic  HENT:      Head: Normocephalic and atraumatic  Jaw: There is normal jaw occlusion  No trismus, tenderness, swelling, pain on movement or malocclusion  Nose: Nose normal       Mouth/Throat:      Lips: Pink  No lesions  Mouth: Mucous membranes are moist  No oral lesions  Dentition: Dental tenderness, gingival swelling (Significant gingival swelling) and dental caries present  No dental abscesses (No periapical abscess identified) or gum lesions  Tongue: No lesions  Tongue does not deviate from midline  Palate: No mass and lesions  Pharynx: Oropharynx is clear  Uvula midline  No pharyngeal swelling, oropharyngeal exudate, posterior oropharyngeal erythema or uvula swelling  Tonsils: No tonsillar exudate or tonsillar abscesses  0 on the right  0 on the left  Comments: No facial swelling  Eyes:      Extraocular Movements: Extraocular movements intact  Conjunctiva/sclera: Conjunctivae normal    Neck:      Trachea: Trachea and phonation normal  No abnormal tracheal secretions  Cardiovascular:      Rate and Rhythm: Normal rate  Pulses:           Radial pulses are 2+ on the right side and 2+ on the left side  Heart sounds: Normal heart sounds, S1 normal and S2 normal    Pulmonary:      Effort: Pulmonary effort is normal  No tachypnea or respiratory distress        Breath sounds: Normal breath sounds and air entry  No stridor, decreased air movement or transmitted upper airway sounds  No decreased breath sounds  Musculoskeletal:         General: Normal range of motion  Cervical back: Full passive range of motion without pain, normal range of motion and neck supple  Lymphadenopathy:      Cervical: No cervical adenopathy  Skin:     General: Skin is warm and dry  Capillary Refill: Capillary refill takes less than 2 seconds  Findings: No rash or wound  Neurological:      General: No focal deficit present  Mental Status: He is alert and oriented to person, place, and time  Mental status is at baseline  GCS: GCS eye subscore is 4  GCS verbal subscore is 5  GCS motor subscore is 6  Vital Signs  ED Triage Vitals [04/01/23 1940]   Temperature Pulse Respirations Blood Pressure SpO2   98 1 °F (36 7 °C) 91 18 168/71 95 %      Temp src Heart Rate Source Patient Position - Orthostatic VS BP Location FiO2 (%)   -- Monitor Sitting Right arm --      Pain Score       5           Vitals:    04/01/23 1940   BP: 168/71   Pulse: 91   Patient Position - Orthostatic VS: Sitting         Visual Acuity      ED Medications  Medications   penicillin V potassium (VEETID) tablet 500 mg (500 mg Oral Given 4/1/23 2219)   oxyCODONE (ROXICODONE) IR tablet 5 mg (5 mg Oral Given 4/1/23 2219)   acetaminophen (TYLENOL) tablet 975 mg (975 mg Oral Given 4/1/23 2219)   ibuprofen (MOTRIN) tablet 600 mg (600 mg Oral Given 4/1/23 2219)       Diagnostic Studies  Results Reviewed     None                 No orders to display              Procedures  Procedures         ED Course  ED Course as of 04/02/23 0358   Sat Apr 01, 2023 2123 Triage vital signs stable                                             Medical Decision Making  DDx including but not limited to: dental jimmy, dental infection, dental abscess, dry socket, gingivitis; considered but doubt samantha's angina      Patient is a 27-year-old male presenting for evaluation of 4 days of dental pain  His triage vital signs are stable  On initial exam, patient is evidencing moderate distress and holding his right cheek  His physical exam is notable for diffuse gingivitis and tenderness to the right upper tooth indicated on physical exam charting  There is no periapical abscess to drain  He has no systemic signs of infection, facial swelling, adenopathy, neck pain, or neck swelling  He is tolerating secretions appropriately and denies sore throat, difficulty swallowing, painful swelling  Suspicion highest for dental infection  Plan made for penicillin VK and pain control  Patient notes that he has a dentist appointment on Monday for reevaluation  Plan made for discharged home with antibiotics, Tylenol, NSAIDs, Orajel, and 4 tabs of oxycodone for severe breakthrough pain  Patient in agreement with plan  At this time given stable vital signs, physical exam, and absence of red flag symptoms, no indication for blood work or imaging  Patient is medically stable for discharge home  DC paperwork reviewed with emphasis on new prescriptions, follow-up with dentist, and strict return precautions  Dental infection: acute illness or injury  Gingivitis: acute illness or injury  Tooth pain: acute illness or injury  Risk  OTC drugs  Prescription drug management  Parenteral controlled substances            Disposition  Final diagnoses:   Dental infection   Gingivitis   Tooth pain     Time reflects when diagnosis was documented in both MDM as applicable and the Disposition within this note     Time User Action Codes Description Comment    4/1/2023  9:52 PM Sabiha Crawford Add [K04 7] Dental infection     4/1/2023  9:52 PM Darrick Crain, [address] [place] [address] [F67 24] Gingivitis     4/1/2023  9:55 PM Sabiha Crawford Add [K08 89] Tooth pain       ED Disposition     ED Disposition   Discharge    Condition   Stable    Date/Time   Sat Apr 1, 2023  9:52 PM    Comment   Barbie Balbuena discharge to home/self care                 Follow-up Information     Follow up With Specialties Details Why Contact Info Additional Information    Go to your dentist on Monday 4/3/23 for further evaluation  Go on 4/3/2023       [facility] Emergency Medicine Go to  If symptoms worsen [address] Emergency Department, [address], [place], [place], [address]          Discharge Medication List as of 4/1/2023  9:57 PM      START taking these medications    Details   oxyCODONE (Roxicodone) 5 immediate release tablet Take 1 tablet (5 mg total) by mouth every 6 (six) hours as needed for moderate pain for up to 4 doses Max Daily Amount: 20 mg, Starting Sat 4/1/2023, Print      penicillin V potassium (VEETID) 500 mg tablet Take 1 tablet (500 mg total) by mouth 4 (four) times a day for 7 days Do not start before April 2, 2023 , Starting Sun 4/2/2023, Until Sun 4/9/2023, Normal         CONTINUE these medications which have CHANGED    Details   benzocaine (BABY ORAJEL) 7 5 % oral gel Apply to the mouth or throat 3 (three) times a day as needed for mucositis for up to 3 days Do not start before April 2, 2023 , Starting Sun 4/2/2023, Until Wed 4/5/2023 at 2359, Normal         CONTINUE these medications which have NOT CHANGED    Details   albuterol (Ventolin HFA) 90 mcg/act inhaler Inhale 2 puffs every 4 (four) hours as needed for wheezing, Starting Tue 3/29/2022, Normal      benzonatate (TESSALON) 200 MG capsule Take 1 capsule (200 mg total) by mouth 3 (three) times a day as needed for cough, Starting Tue 3/28/2023, Normal      hydrocortisone (ANUSOL-HC) 2 5 % rectal cream Apply topically 2 (two) times a day, Starting Wed 12/7/2022, Normal      ketorolac (TORADOL) 10 mg tablet Take 1 tablet (10 mg total) by mouth every 8 (eight) hours as needed for moderate pain (migraine/headache), Starting Thu 4/8/2021, Normal      TiZANidine (ZANAFLEX) 4 MG capsule Take 1 capsule (4 mg total) by mouth 3 (three) times a day as needed for muscle spasms, Starting Thu 10/20/2022, Normal             No discharge procedures on file      PDMP Review       Value Time User    PDMP Reviewed  Yes 4/1/2023  9:23 PM Mirela Callahan          ED Provider  Electronically Signed by           Mirela Callahan  04/02/23 3996

## 2023-04-02 NOTE — DISCHARGE INSTRUCTIONS
Go to your scheduled dental appointment on Monday  Take the prescribed antibiotic as directed  Use 650 mg of Tylenol every 4 hours and/or 600 mg ibuprofen every 6 hours as needed for mild to moderate pain  Apply topical Orajel for symptomatic relief of your gum pain  Use the prescribed oxycodone for severe pain  Return to the ER if you develop fever, facial swelling, difficulty swallowing, neck pain, neck stiffness, or severe headache

## 2023-04-04 ENCOUNTER — HOSPITAL ENCOUNTER (INPATIENT)
Facility: HOSPITAL | Age: 28
LOS: 1 days | Discharge: HOME | End: 2023-04-04
Attending: EMERGENCY MEDICINE | Admitting: INTERNAL MEDICINE

## 2023-04-04 ENCOUNTER — APPOINTMENT (EMERGENCY)
Dept: CT IMAGING | Facility: HOSPITAL | Age: 28
End: 2023-04-04

## 2023-04-04 VITALS
BODY MASS INDEX: 56.53 KG/M2 | HEART RATE: 95 BPM | TEMPERATURE: 98.3 F | HEIGHT: 70 IN | SYSTOLIC BLOOD PRESSURE: 132 MMHG | DIASTOLIC BLOOD PRESSURE: 67 MMHG | RESPIRATION RATE: 20 BRPM | OXYGEN SATURATION: 94 %

## 2023-04-04 DIAGNOSIS — L03.211 FACIAL CELLULITIS: Primary | ICD-10-CM

## 2023-04-04 DIAGNOSIS — K04.7 DENTAL ABSCESS: ICD-10-CM

## 2023-04-04 LAB
ALBUMIN SERPL BCP-MCNC: 4.2 G/DL (ref 3.5–5)
ALP SERPL-CCNC: 59 U/L (ref 34–104)
ALT SERPL W P-5'-P-CCNC: 37 U/L (ref 7–52)
ANION GAP SERPL CALCULATED.3IONS-SCNC: 6 MMOL/L (ref 4–13)
AST SERPL W P-5'-P-CCNC: 16 U/L (ref 13–39)
BASOPHILS # BLD AUTO: 0.03 THOUSANDS/ÂΜL (ref 0–0.1)
BASOPHILS NFR BLD AUTO: 0 % (ref 0–1)
BILIRUB SERPL-MCNC: 0.74 MG/DL (ref 0.2–1)
BUN SERPL-MCNC: 8 MG/DL (ref 5–25)
CALCIUM SERPL-MCNC: 9.5 MG/DL (ref 8.4–10.2)
CHLORIDE SERPL-SCNC: 98 MMOL/L (ref 96–108)
CO2 SERPL-SCNC: 32 MMOL/L (ref 21–32)
CREAT SERPL-MCNC: 0.77 MG/DL (ref 0.6–1.3)
CRP SERPL QL: 75.8 MG/L
EOSINOPHIL # BLD AUTO: 0.13 THOUSAND/ÂΜL (ref 0–0.61)
EOSINOPHIL NFR BLD AUTO: 2 % (ref 0–6)
ERYTHROCYTE [DISTWIDTH] IN BLOOD BY AUTOMATED COUNT: 12.4 % (ref 11.6–15.1)
GFR SERPL CREATININE-BSD FRML MDRD: 123 ML/MIN/1.73SQ M
GLUCOSE SERPL-MCNC: 111 MG/DL (ref 65–140)
HCT VFR BLD AUTO: 50.4 % (ref 36.5–49.3)
HGB BLD-MCNC: 16.1 G/DL (ref 12–17)
IMM GRANULOCYTES # BLD AUTO: 0.04 THOUSAND/UL (ref 0–0.2)
IMM GRANULOCYTES NFR BLD AUTO: 1 % (ref 0–2)
LACTATE SERPL-SCNC: 1.2 MMOL/L (ref 0.5–2)
LYMPHOCYTES # BLD AUTO: 1.94 THOUSANDS/ÂΜL (ref 0.6–4.47)
LYMPHOCYTES NFR BLD AUTO: 22 % (ref 14–44)
MCH RBC QN AUTO: 29 PG (ref 26.8–34.3)
MCHC RBC AUTO-ENTMCNC: 31.9 G/DL (ref 31.4–37.4)
MCV RBC AUTO: 91 FL (ref 82–98)
MONOCYTES # BLD AUTO: 0.52 THOUSAND/ÂΜL (ref 0.17–1.22)
MONOCYTES NFR BLD AUTO: 6 % (ref 4–12)
NEUTROPHILS # BLD AUTO: 6.04 THOUSANDS/ÂΜL (ref 1.85–7.62)
NEUTS SEG NFR BLD AUTO: 69 % (ref 43–75)
NRBC BLD AUTO-RTO: 0 /100 WBCS
PLATELET # BLD AUTO: 240 THOUSANDS/UL (ref 149–390)
PMV BLD AUTO: 9.5 FL (ref 8.9–12.7)
POTASSIUM SERPL-SCNC: 4.2 MMOL/L (ref 3.5–5.3)
PROT SERPL-MCNC: 7.3 G/DL (ref 6.4–8.4)
RBC # BLD AUTO: 5.55 MILLION/UL (ref 3.88–5.62)
SODIUM SERPL-SCNC: 136 MMOL/L (ref 135–147)
WBC # BLD AUTO: 8.7 THOUSAND/UL (ref 4.31–10.16)

## 2023-04-04 RX ORDER — MORPHINE SULFATE 4 MG/ML
4 INJECTION, SOLUTION INTRAMUSCULAR; INTRAVENOUS ONCE
Status: DISCONTINUED | OUTPATIENT
Start: 2023-04-04 | End: 2023-04-04

## 2023-04-04 RX ORDER — KETOROLAC TROMETHAMINE 30 MG/ML
15 INJECTION, SOLUTION INTRAMUSCULAR; INTRAVENOUS EVERY 6 HOURS PRN
Status: DISCONTINUED | OUTPATIENT
Start: 2023-04-04 | End: 2023-04-04 | Stop reason: HOSPADM

## 2023-04-04 RX ORDER — KETOROLAC TROMETHAMINE 30 MG/ML
15 INJECTION, SOLUTION INTRAMUSCULAR; INTRAVENOUS ONCE
Status: COMPLETED | OUTPATIENT
Start: 2023-04-04 | End: 2023-04-04

## 2023-04-04 RX ORDER — OXYCODONE HYDROCHLORIDE 5 MG/1
5 TABLET ORAL EVERY 4 HOURS PRN
Status: DISCONTINUED | OUTPATIENT
Start: 2023-04-04 | End: 2023-04-04 | Stop reason: HOSPADM

## 2023-04-04 RX ORDER — ONDANSETRON 2 MG/ML
4 INJECTION INTRAMUSCULAR; INTRAVENOUS ONCE
Status: COMPLETED | OUTPATIENT
Start: 2023-04-04 | End: 2023-04-04

## 2023-04-04 RX ORDER — HEPARIN SODIUM 5000 [USP'U]/ML
7500 INJECTION, SOLUTION INTRAVENOUS; SUBCUTANEOUS EVERY 8 HOURS SCHEDULED
Status: DISCONTINUED | OUTPATIENT
Start: 2023-04-04 | End: 2023-04-04 | Stop reason: HOSPADM

## 2023-04-04 RX ORDER — OXYCODONE HYDROCHLORIDE 10 MG/1
10 TABLET ORAL ONCE
Status: COMPLETED | OUTPATIENT
Start: 2023-04-04 | End: 2023-04-04

## 2023-04-04 RX ORDER — AMOXICILLIN AND CLAVULANATE POTASSIUM 875; 125 MG/1; MG/1
1 TABLET, FILM COATED ORAL EVERY 12 HOURS SCHEDULED
Qty: 10 TABLET | Refills: 0 | Status: SHIPPED | OUTPATIENT
Start: 2023-04-04 | End: 2023-04-09

## 2023-04-04 RX ADMIN — SODIUM CHLORIDE 1000 ML: 0.9 INJECTION, SOLUTION INTRAVENOUS at 10:41

## 2023-04-04 RX ADMIN — SODIUM CHLORIDE 3 G: 9 INJECTION, SOLUTION INTRAVENOUS at 16:13

## 2023-04-04 RX ADMIN — IOHEXOL 85 ML: 350 INJECTION, SOLUTION INTRAVENOUS at 12:30

## 2023-04-04 RX ADMIN — SODIUM CHLORIDE 3 G: 9 INJECTION, SOLUTION INTRAVENOUS at 10:44

## 2023-04-04 RX ADMIN — KETOROLAC TROMETHAMINE 15 MG: 30 INJECTION, SOLUTION INTRAMUSCULAR at 15:28

## 2023-04-04 RX ADMIN — ONDANSETRON 4 MG: 2 INJECTION INTRAMUSCULAR; INTRAVENOUS at 10:40

## 2023-04-04 RX ADMIN — KETOROLAC TROMETHAMINE 15 MG: 30 INJECTION, SOLUTION INTRAMUSCULAR at 10:46

## 2023-04-04 RX ADMIN — HEPARIN SODIUM 7500 UNITS: 5000 INJECTION INTRAVENOUS; SUBCUTANEOUS at 14:43

## 2023-04-04 RX ADMIN — OXYCODONE HYDROCHLORIDE 10 MG: 10 TABLET ORAL at 13:27

## 2023-04-04 NOTE — ASSESSMENT & PLAN NOTE
CT - 1 3 x 0 8 cm subperiosteal abscess along right maxillary alveolar ridge adjacent to periodontal disease of maxillary right 1st-2nd premolar teeth with facial cellulitis  Pt failed outpatient antibiotics - was also seen by oral surgeon yesterday - pain and swelling worsened today  We will continue with Unasyn  Consult OMFS    Pain control

## 2023-04-04 NOTE — PLAN OF CARE
Problem: PAIN - ADULT  Goal: Verbalizes/displays adequate comfort level or baseline comfort level  Description: Interventions:  - Encourage patient to monitor pain and request assistance  - Assess pain using appropriate pain scale  - Administer analgesics based on type and severity of pain and evaluate response  - Implement non-pharmacological measures as appropriate and evaluate response  - Consider cultural and social influences on pain and pain management  - Notify physician/advanced practitioner if interventions unsuccessful or patient reports new pain  4/4/2023 1831 by Anjana Simpson RN  Outcome: Adequate for Discharge  4/4/2023 1547 by Anjana Simpson RN  Outcome: Progressing     Problem: INFECTION - ADULT  Goal: Absence or prevention of progression during hospitalization  Description: INTERVENTIONS:  - Assess and monitor for signs and symptoms of infection  - Monitor lab/diagnostic results  - Monitor all insertion sites, i e  indwelling lines, tubes, and drains  - Monitor endotracheal if appropriate and nasal secretions for changes in amount and color  - [place] appropriate cooling/warming therapies per order  - Administer medications as ordered  - Instruct and encourage patient and family to use good hand hygiene technique  - Identify and instruct in appropriate isolation precautions for identified infection/condition  4/4/2023 1831 by Anjana Simpson RN  Outcome: Adequate for Discharge  4/4/2023 1547 by Anjana Simpson RN  Outcome: Progressing

## 2023-04-04 NOTE — PLAN OF CARE
Problem: PAIN - ADULT  Goal: Verbalizes/displays adequate comfort level or baseline comfort level  Description: Interventions:  - Encourage patient to monitor pain and request assistance  - Assess pain using appropriate pain scale  - Administer analgesics based on type and severity of pain and evaluate response  - Implement non-pharmacological measures as appropriate and evaluate response  - Consider cultural and social influences on pain and pain management  - Notify physician/advanced practitioner if interventions unsuccessful or patient reports new pain  Outcome: Progressing     Problem: INFECTION - ADULT  Goal: Absence or prevention of progression during hospitalization  Description: INTERVENTIONS:  - Assess and monitor for signs and symptoms of infection  - Monitor lab/diagnostic results  - Monitor all insertion sites, i e  indwelling lines, tubes, and drains  - Monitor endotracheal if appropriate and nasal secretions for changes in amount and color  - [place] appropriate cooling/warming therapies per order  - Administer medications as ordered  - Instruct and encourage patient and family to use good hand hygiene technique  - Identify and instruct in appropriate isolation precautions for identified infection/condition  Outcome: Progressing

## 2023-04-04 NOTE — ED PROVIDER NOTES
History  Chief Complaint   Patient presents with   • Facial Swelling     Sent here by dentist for right sided tooth infection, told to come here for drainage  Seen here recently and put on abx and pain meds  Worsening right sided swelling since last night  Denies fevers/chills     Javy Grey is a 27-year-old male with no known medical problems who presents with worsening dental pain  States he was seen 4 days ago for right upper tooth pain, was noted to have dental abscess at that time, was started on penicillin V  Followed up with physician yesterday, was switched from penicillin to clindamycin  Overnight, patient developed significant right-sided facial swelling, is having difficulty opening mouth, has throbbing pain, 10/10, from under the right eye through the top of the right side of the neck, centered in right teeth 5 and 6, however denies throat swelling, difficulty breathing, or difficulty swallowing  Has not been able to eat in the past 5 days and has been able to take minimal p o  fluids given severe pain in mouth  Was seen by a dentist this morning and recommended that he go to the ER immediately for possible abscess drainage  Had subjective fever overnight, however denies chills, weakness, lightheadedness, lymphadenopathy, chest pain, shortness of breath, abdominal pain, nausea, vomiting, rash, or syncope  Has been taking oxycodone and antibiotics as prescribed without relief of symptoms  Prior to Admission Medications   Prescriptions Last Dose Informant Patient Reported? Taking?    CLINDAMYCIN HCL PO 4/3/2023  Yes Yes   Sig: Take 300 mg by mouth 4 (four) times a day   TiZANidine (ZANAFLEX) 4 MG capsule Not Taking  No No   Sig: Take 1 capsule (4 mg total) by mouth 3 (three) times a day as needed for muscle spasms   Patient not taking: Reported on 4/4/2023   albuterol (Ventolin HFA) 90 mcg/act inhaler Not Taking  No No   Sig: Inhale 2 puffs every 4 (four) hours as needed for wheezing   Patient "not taking: Reported on 6/20/2022   benzocaine (BABY ORAJEL) 7 5 % oral gel Not Taking  No No   Sig: Apply to the mouth or throat 3 (three) times a day as needed for mucositis for up to 3 days Do not start before April 2, 2023  Patient not taking: Reported on 4/4/2023   benzonatate (TESSALON) 200 MG capsule Not Taking  No No   Sig: Take 1 capsule (200 mg total) by mouth 3 (three) times a day as needed for cough   Patient not taking: Reported on 4/4/2023   hydrocortisone (ANUSOL-HC) 2 5 % rectal cream Not Taking  No No   Sig: Apply topically 2 (two) times a day   Patient not taking: Reported on 4/4/2023   ketorolac (TORADOL) 10 mg tablet Not Taking  No No   Sig: Take 1 tablet (10 mg total) by mouth every 8 (eight) hours as needed for moderate pain (migraine/headache)   Patient not taking: Reported on 9/29/2022   oxyCODONE (Roxicodone) 5 immediate release tablet 4/3/2023  No Yes   Sig: Take 1 tablet (5 mg total) by mouth every 6 (six) hours as needed for moderate pain for up to 4 doses Max Daily Amount: 20 mg   penicillin V potassium (VEETID) 500 mg tablet Not Taking  No No   Sig: Take 1 tablet (500 mg total) by mouth 4 (four) times a day for 7 days Do not start before April 2, 2023     Patient not taking: Reported on 4/4/2023      Facility-Administered Medications: None       Past Medical History:   Diagnosis Date   • Ankle fracture     right   • Anxiety 09/27/2020   • Apnea 04/28/2020   • Class 3 severe obesity due to excess calories without serious comorbidity with body mass index (BMI) of 45 0 to 49 9 in [facility]) 04/28/2020   • Daytime somnolence 04/28/2020   • Depression    • Fall [id number]    \"fell off of a roof\"   • Mild intermittent asthma    • Nonintractable headache 04/28/2020   • Poor short term memory 04/28/2020   • Sleep apnea    • Snoring 04/28/2020       Past Surgical History:   Procedure Laterality Date   • TOOTH EXTRACTION         Family History   Problem Relation Age of Onset   • Diabetes Mother  " • Breast cancer Mother    • Diabetes Father    • Hypertension Father    • Diabetes Brother    • Hypertension Brother    • Hypertension Maternal Grandfather    • Diabetes Maternal Grandfather    • Thyroid disease Maternal Grandfather    • Diabetes Paternal Grandmother    • Alzheimer's disease Paternal Grandfather    • Thyroid disease Maternal Uncle    • Alzheimer's disease Paternal Aunt      I have reviewed and agree with the history as documented  E-Cigarette/Vaping   • E-Cigarette Use Never User      E-Cigarette/Vaping Substances   • Nicotine No    • THC No    • CBD No    • Flavoring No    • Other No    • Unknown No      Social History     Tobacco Use   • Smoking status: Former     Packs/day: 0 50     Types: Cigarettes     Quit date: 2018     Years since quittin 8   • Smokeless tobacco: Former     Types: Chew   Vaping Use   • Vaping Use: Never used   Substance Use Topics   • Alcohol use: Yes     Alcohol/week: 3 0 standard drinks     Types: 3 Cans of beer per week     Comment: socially   • Drug use: No        Review of Systems   Constitutional: Positive for fatigue and fever  Negative for chills and diaphoresis  HENT: Positive for dental problem, facial swelling, sore throat and voice change  Negative for drooling, ear pain, hearing loss, nosebleeds, rhinorrhea, sinus pain and trouble swallowing  Feels voice sounds different  Eyes: Negative  Negative for photophobia, pain, redness and visual disturbance  Respiratory: Negative  Negative for cough and shortness of breath  Cardiovascular: Negative  Negative for chest pain, palpitations and leg swelling  Gastrointestinal: Negative for abdominal pain, nausea and vomiting  Musculoskeletal: Negative for back pain, neck pain and neck stiffness  Skin: Negative for rash  Allergic/Immunologic: Negative for immunocompromised state  Neurological: Negative for dizziness, weakness, light-headedness and headaches  Psychiatric/Behavioral: Negative for confusion  All other systems reviewed and are negative  Physical Exam  ED Triage Vitals [04/04/23 1004]   Temperature Pulse Respirations Blood Pressure SpO2   98 8 °F (37 1 °C) 98 17 141/77 95 %      Temp Source Heart Rate Source Patient Position - Orthostatic VS BP Location FiO2 (%)   Oral Monitor -- Right arm --      Pain Score       5             Orthostatic Vital Signs  Vitals:    04/04/23 1130 04/04/23 1200 04/04/23 1312 04/04/23 1527   BP: 111/60 114/61 132/90 132/67   Pulse: 83 82 83 95       Physical Exam  Vitals and nursing note reviewed  Exam conducted with a chaperone present  Constitutional:       Appearance: He is obese  He is ill-appearing  HENT:      Head: Normocephalic  Jaw: Pain on movement present  Comments: Right sided facial swelling with erythema from right infraorbital ridge through underside of right mandible  Right Ear: External ear normal       Left Ear: External ear normal       Nose: Nose normal       Mouth/Throat:      Mouth: Mucous membranes are dry  Dentition: Abnormal dentition  Gingival swelling, dental caries and dental abscesses present  Tongue: No lesions  Tongue does not deviate from midline  Pharynx: Oropharynx is clear  Uvula midline  No uvula swelling  Comments: Limited visualization of posterior oropharynx given severe pain on attempted mouth opening, however uvula midline, no obvious oropharyngeal edema, no palatal elevation  Tongue midline  Lips cracked and dry  Eyes:      General: No scleral icterus  Right eye: No discharge  Left eye: No discharge  Extraocular Movements: Extraocular movements intact  Conjunctiva/sclera: Conjunctivae normal       Pupils: Pupils are equal, round, and reactive to light  Cardiovascular:      Rate and Rhythm: Normal rate and regular rhythm  Pulses: Normal pulses  Heart sounds: Normal heart sounds     Pulmonary: Effort: Pulmonary effort is normal  No respiratory distress  Breath sounds: Normal breath sounds  No stridor  No wheezing, rhonchi or rales  Musculoskeletal:      Cervical back: Normal range of motion and neck supple  Lymphadenopathy:      Cervical: No cervical adenopathy  Skin:     General: Skin is warm and dry  Capillary Refill: Capillary refill takes less than 2 seconds  Findings: Erythema present  No bruising  Neurological:      General: No focal deficit present  Mental Status: He is alert and oriented to person, place, and time  ED Medications  Medications   ampicillin-sulbactam (UNASYN) 3 g in sodium chloride 0 9 % 100 mL IVPB (0 g Intravenous Stopped 4/4/23 1643)   ketorolac (TORADOL) injection 15 mg (15 mg Intravenous Given 4/4/23 1528)   oxyCODONE (ROXICODONE) IR tablet 5 mg (has no administration in time range)   heparin (porcine) subcutaneous injection 7,500 Units (7,500 Units Subcutaneous Given 4/4/23 1443)   sodium chloride 0 9 % bolus 1,000 mL (1,000 mL Intravenous New Bag 4/4/23 1041)   ondansetron (ZOFRAN) injection 4 mg (4 mg Intravenous Given 4/4/23 1040)   ampicillin-sulbactam (UNASYN) 3 g in sodium chloride 0 9 % 100 mL IVPB (0 g Intravenous Stopped 4/4/23 1209)   ketorolac (TORADOL) injection 15 mg (15 mg Intravenous Given 4/4/23 1046)   iohexol (OMNIPAQUE) 350 MG/ML injection (SINGLE-DOSE) 85 mL (85 mL Intravenous Given 4/4/23 1230)   oxyCODONE (ROXICODONE) immediate release tablet 10 mg (10 mg Oral Given 4/4/23 1327)       Diagnostic Studies  Results Reviewed     Procedure Component Value Units Date/Time    Blood culture #2 [[id number]] Collected: 04/04/23 1039    Lab Status: Preliminary result Specimen: Blood from Arm, Right Updated: 04/04/23 1501     Blood Culture Received in Microbiology Lab  Culture in Progress      Blood culture #1 [[id number]] Collected: 04/04/23 1039    Lab Status: Preliminary result Specimen: Blood from Arm, Left Updated: 04/04/23 1501 Blood Culture Received in Microbiology Lab  Culture in Progress  Platelet count [[id number]]     Lab Status: No result Specimen: Blood     Comprehensive metabolic panel [[id number]] Collected: 04/04/23 1039    Lab Status: Final result Specimen: Blood from Arm, Left Updated: 04/04/23 1132     Sodium 136 mmol/L      Potassium 4 2 mmol/L      Chloride 98 mmol/L      CO2 32 mmol/L      ANION GAP 6 mmol/L      BUN 8 mg/dL      Creatinine 0 77 mg/dL      Glucose 111 mg/dL      Calcium 9 5 mg/dL      AST 16 U/L      ALT 37 U/L      Alkaline Phosphatase 59 U/L      Total Protein 7 3 g/dL      Albumin 4 2 g/dL      Total Bilirubin 0 74 mg/dL      eGFR 123 ml/min/1 73sq m     Narrative:      [place] guidelines for Chronic Kidney Disease (CKD):   •  Stage 1 with normal or high GFR (GFR > 90 mL/min/1 73 square meters)  •  Stage 2 Mild CKD (GFR = 60-89 mL/min/1 73 square meters)  •  Stage 3A Moderate CKD (GFR = 45-59 mL/min/1 73 square meters)  •  Stage 3B Moderate CKD (GFR = 30-44 mL/min/1 73 square meters)  •  Stage 4 Severe CKD (GFR = 15-29 mL/min/1 73 square meters)  •  Stage 5 End Stage CKD (GFR <15 mL/min/1 73 square meters)  Note: GFR calculation is accurate only with a steady state creatinine    C-reactive protein [[id number]]  (Abnormal) Collected: 04/04/23 1039    Lab Status: Final result Specimen: Blood from Arm, Left Updated: 04/04/23 1132     CRP 75 8 mg/L     Lactic acid, plasma (w/reflex if result > 2 0) [[id number]]  (Normal) Collected: 04/04/23 1039    Lab Status: Final result Specimen: Blood from Arm, Left Updated: 04/04/23 1129     LACTIC ACID 1 2 mmol/L     Narrative:      Result may be elevated if tourniquet was used during collection      CBC and differential [[id number]]  (Abnormal) Collected: 04/04/23 1039    Lab Status: Final result Specimen: Blood from Arm, Left Updated: 04/04/23 1106     WBC 8 70 Thousand/uL      RBC 5 55 Million/uL      Hemoglobin 16 1 g/dL      Hematocrit 50 4 %      MCV 91 fL      MCH 29 0 pg      MCHC 31 9 g/dL      RDW 12 4 %      MPV 9 5 fL      Platelets 223 Thousands/uL      nRBC 0 /100 WBCs      Neutrophils Relative 69 %      Immat GRANS % 1 %      Lymphocytes Relative 22 %      Monocytes Relative 6 %      Eosinophils Relative 2 %      Basophils Relative 0 %      Neutrophils Absolute 6 04 Thousands/µL      Immature Grans Absolute 0 04 Thousand/uL      Lymphocytes Absolute 1 94 Thousands/µL      Monocytes Absolute 0 52 Thousand/µL      Eosinophils Absolute 0 13 Thousand/µL      Basophils Absolute 0 03 Thousands/µL                  CT facial bones with contrast   Final Result by Ramsey Lang DO (04/04 1258)      Right facial cellulitis centered immediately superficial to the right paramedian aspect of the maxilla where there is a 1 2 cm soft tissue abscess superficial to periapical lucencies within teeth #5 and 6 suggesting dental origin of this infection  Workstation performed: [id number]         CT soft tissue neck with contrast   Final Result by Sonny Huntley MD (04/04 1324)      1 3 x 0 8 cm subperiosteal abscess along right maxillary alveolar ridge adjacent to periodontal disease of maxillary right 1st-2nd premolar teeth, which are likely odontogenic sources of infection  Recommend dental consultation for further evaluation  Acute facial cellulitis in right inferior periorbital, right cheek, and right buccal spaces  Additional chronic/incidental findings as detailed above  The study was marked in [facility] for immediate notification  Workstation performed: [id number]               Procedures  Procedures      ED Course  ED Course as of 04/04/23 1759   Tue Apr 04, 2023   1114 CBC and differential(!)  No leukocytosis, H/H WNL  1131 LACTIC ACID: 1 2  WNL  1132 C-REACTIVE PROTEIN(!): 75 8  Elevated  1132 Comprehensive metabolic panel  WNL   No electrolyte abnormalities or evidence of end organ dysfunction  1302 CT facial bones with contrast  IMPRESSION:     Right facial cellulitis centered immediately superficial to the right paramedian aspect of the maxilla where there is a 1 2 cm soft tissue abscess superficial to periapical lucencies within teeth #5 and 6 suggesting dental origin of this infection  1326 CT soft tissue neck with contrast  IMPRESSION:     1 3 x 0 8 cm subperiosteal abscess along right maxillary alveolar ridge adjacent to periodontal disease of maxillary right 1st-2nd premolar teeth, which are likely odontogenic sources of infection  Recommend dental consultation for further evaluation      Acute facial cellulitis in right inferior periorbital, right cheek, and right buccal spaces      Additional chronic/incidental findings as detailed above       1326 Reached out to [address] [place] [address] for admission  Medical Decision Making  Patient presents from dentist office with  facial swelling and worsening dental pain despite adherence to outpatient antibiotic regimen  Airway is grossly patent, however given pain from infraorbital ridge through upper neck, concern exists for soft tissue versus osseous infection, less likely Alley's angina or preseptal extension, however will evaluate with imaging for same  Will obtain labs including CBC, BMP, lactic acid, and blood culture  Will start on broad-spectrum IV antibiotic and give IV fluids without delay  See ED course for MDM  Amount and/or Complexity of Data Reviewed  Labs: ordered  Decision-making details documented in ED Course  Radiology: ordered  Decision-making details documented in ED Course  Risk  Prescription drug management  Decision regarding hospitalization              Disposition  Final diagnoses:   Dental abscess   Facial cellulitis     Time reflects when diagnosis was documented in both MDM as applicable and the Disposition within this note     Time User Action Codes Description Comment 4/4/2023  1:32 PM Savanah Perdomo Add [K04 7] Dental abscess     4/4/2023  1:32 PM Savanah Perdomo Add [G45 909] Facial cellulitis     4/4/2023  1:32 PM Savanah Perdomo Modify [K04 7] Dental abscess     4/4/2023  1:32 PM Savanah Perdomo Modify [F33 008] Facial cellulitis       ED Disposition     ED Disposition   Admit    Condition   Stable    Date/Time   Tue Apr 4, 2023  1:33 PM    Comment   Case was discussed with Dr Jonna Bird and the patient's admission status was agreed to be Admission Status: inpatient status to the service of Dr Jonna Bird   Follow-up Information    None         Current Discharge Medication List      CONTINUE these medications which have NOT CHANGED    Details   CLINDAMYCIN HCL PO Take 300 mg by mouth 4 (four) times a day      oxyCODONE (Roxicodone) 5 immediate release tablet Take 1 tablet (5 mg total) by mouth every 6 (six) hours as needed for moderate pain for up to 4 doses Max Daily Amount: 20 mg  Qty: 4 tablet, Refills: 0    Associated Diagnoses: Dental infection      albuterol (Ventolin HFA) 90 mcg/act inhaler Inhale 2 puffs every 4 (four) hours as needed for wheezing  Qty: 6 7 g, Refills: 1    Comments: Substitution to a formulary equivalent within the same pharmaceutical class is authorized  Associated Diagnoses: Mild intermittent asthma without complication      benzocaine (BABY ORAJEL) 7 5 % oral gel Apply to the mouth or throat 3 (three) times a day as needed for mucositis for up to 3 days Do not start before April 2, 2023  Qty: 14 g, Refills: 0    Associated Diagnoses: Dental infection;  Tooth pain      benzonatate (TESSALON) 200 MG capsule Take 1 capsule (200 mg total) by mouth 3 (three) times a day as needed for cough  Qty: 20 capsule, Refills: 0    Associated Diagnoses: Acute cough      hydrocortisone (ANUSOL-HC) 2 5 % rectal cream Apply topically 2 (two) times a day  Qty: 28 g, Refills: 0    Associated Diagnoses: Hemorrhoids, unspecified hemorrhoid type ketorolac (TORADOL) 10 mg tablet Take 1 tablet (10 mg total) by mouth every 8 (eight) hours as needed for moderate pain (migraine/headache)  Qty: 30 tablet, Refills: 0    Associated Diagnoses: Nonintractable headache, unspecified chronicity pattern, unspecified headache type      penicillin V potassium (VEETID) 500 mg tablet Take 1 tablet (500 mg total) by mouth 4 (four) times a day for 7 days Do not start before April 2, 2023  Qty: 28 tablet, Refills: 0    Associated Diagnoses: Dental infection      TiZANidine (ZANAFLEX) 4 MG capsule Take 1 capsule (4 mg total) by mouth 3 (three) times a day as needed for muscle spasms  Qty: 30 capsule, Refills: 0    Associated Diagnoses: Acute bilateral low back pain without sciatica           No discharge procedures on file  PDMP Review       Value Time User    PDMP Reviewed  Yes 4/1/2023  9:23 PM Mirela Serna           ED Provider  Attending physically available and evaluated Megan Herrera  I managed the patient along with the ED Attending      Electronically Signed by         Akira Dhillon DO  04/04/23 6201

## 2023-04-04 NOTE — ED ATTENDING ATTESTATION
4/4/2023  IRosario DO, saw and evaluated the patient  I have discussed the patient with the resident/non-physician practitioner and agree with the resident's/non-physician practitioner's findings, Plan of Care, and MDM as documented in the resident's/non-physician practitioner's note, except where noted  All available labs and Radiology studies were reviewed  I was present for key portions of any procedure(s) performed by the resident/non-physician practitioner and I was immediately available to provide assistance  At this point I agree with the current assessment done in the Emergency Department  I have conducted an independent evaluation of this patient a history and physical is as follows:    ED Course   29year old male presents for evaluation of right sided facial swelling and dental pain  Patient was seen in the ED over the weekend and prescribed oral penicillin followed by clindamycin by another provider without improvement  He notes worsening pain and swelling of the face  Girlfriend notes a change in patient's voice  He is unable to eat or drink much due to pain with chewing and swallowing        Critical Care Time  Procedures sounds  Patient moves all 4 extremities equally  Assessment: 31-year-old male presents with acute right facial swelling likely secondary odontogenic infection  She has failed outpatient treatment with oral antibiotics  Plan: Check CT to further define area of abnormality, provide pain control, start IV fluids and IV antibiotics  Check labs  Reassess        Critical Care Time  Procedures

## 2023-04-04 NOTE — ASSESSMENT & PLAN NOTE
CT - 1 3 x 0 8 cm subperiosteal abscess along right maxillary alveolar ridge adjacent to periodontal disease of maxillary right 1st-2nd premolar teeth with facial cellulitis  Pt failed outpatient antibiotics - was also seen by oral surgeon yesterday - pain and swelling worsened today so presented to ED  Patient was evaluated by [facility]  Recommend outpatient follow-up in clinic tomorrow morning for surgical intervention  Patient can be discharged from their standpoint  Discussed with the patient he also prefers to go home tonight  So patient to be discharged home on oral Augmentin  Instructions given for outpatient follow-up tomorrow morning for [facility] intervention

## 2023-04-04 NOTE — H&P
[facility]  H&P  Name: Markie Corral  MRN: [id number]  Unit/Bed#: ED-17 I Date of Admission: 4/4/2023   Date of Service: 4/4/2023 I Hospital Day: 0      Assessment/Plan   * Dental abscess  Assessment & Plan  CT - 1 3 x 0 8 cm subperiosteal abscess along right maxillary alveolar ridge adjacent to periodontal disease of maxillary right 1st-2nd premolar teeth with facial cellulitis  Pt failed outpatient antibiotics - was also seen by oral surgeon yesterday - pain and swelling worsened today  We will continue with Unasyn  Consult OMFS  Pain control    Sleep apnea  Assessment & Plan  At bedtime CPAP           VTE Prophylaxis: Heparin  / sequential compression device   Code Status: full  POLST: POLST form is not discussed and not completed at this time  Discussion with family: pt and girlfriend at bedside    Anticipated Length of Stay:  Patient will be admitted on an Inpatient basis with an anticipated length of stay of  > 2 midnights  Justification for Hospital Stay: above    Total Time for Visit, including Counseling / Coordination of Care: 60 minutes  Greater than 50% of this total time spent on direct patient counseling and coordination of care  Chief Complaint:   Right facial swelling x 1 week    History of Present Illness:    Darryl Phillips is a 29 y o  male who presents with worsening right-sided facial swelling for almost 1 week  Presented to ED few days ago, was discharged on oral antibiotics  Saw a dentist and an oral surgeon outpatient  Was prescribed antibiotics  Patient came in today because of significant increasing the right facial pain and swelling overnight  Reports trouble swallowing with cold things but otherwise does okay with warm food  Patient has history of sleep apnea and per family, his breathing last night seemed worse than usual     Review of Systems:    Review of Systems   Constitutional: Negative for appetite change, chills and fever     HENT: Positive "for facial swelling, sore throat and trouble swallowing  Negative for congestion  Respiratory: Negative for cough and shortness of breath  Cardiovascular: Negative for chest pain and palpitations  Gastrointestinal: Negative for abdominal pain, nausea and vomiting  Genitourinary: Negative for difficulty urinating, flank pain, frequency and urgency  Musculoskeletal: Negative for arthralgias and myalgias  Skin: Positive for color change  Neurological: Negative for dizziness and light-headedness  Psychiatric/Behavioral: Negative for agitation and behavioral problems  Past Medical and Surgical History:     Past Medical History:   Diagnosis Date   • Ankle fracture     right   • Anxiety 09/27/2020   • Apnea 04/28/2020   • Class 3 severe obesity due to excess calories without serious comorbidity with body mass index (BMI) of 45 0 to 49 9 in adult [facility]) 04/28/2020   • Daytime somnolence 04/28/2020   • Depression    • Fall 9040-2119    \"fell off of a roof\"   • Mild intermittent asthma    • Nonintractable headache 04/28/2020   • Poor short term memory 04/28/2020   • Sleep apnea    • Snoring 04/28/2020       Past Surgical History:   Procedure Laterality Date   • TOOTH EXTRACTION         Meds/Allergies:    Prior to Admission medications    Medication Sig Start Date End Date Taking? Authorizing Provider   benzocaine (BABY ORAJEL) 7 5 % oral gel Apply to the mouth or throat 3 (three) times a day as needed for mucositis for up to 3 days Do not start before April 2, 2023 4/2/23 4/5/23 Yes PRIMO Nelson   CLINDAMYCIN HCL PO Take 300 mg by mouth 4 (four) times a day   Yes Historical Provider, MD   hydrocortisone (ANUSOL-HC) 2 5 % rectal cream Apply topically 2 (two) times a day 12/7/22  Yes Elizabeth Peters MD   oxyCODONE (Roxicodone) 5 immediate release tablet Take 1 tablet (5 mg total) by mouth every 6 (six) hours as needed for moderate pain for up to 4 doses Max Daily Amount: 20 mg 4/1/23  Yes Benny Bruno " PRIMO Kim   albuterol (Ventolin HFA) 90 mcg/act inhaler Inhale 2 puffs every 4 (four) hours as needed for wheezing  Patient not taking: Reported on 2022 3/29/22   PRIMO Brooks   benzonatate (TESSALON) 200 MG capsule Take 1 capsule (200 mg total) by mouth 3 (three) times a day as needed for cough  Patient not taking: Reported on 2023 3/28/23   PRIMO Brooks   ketorolac (TORADOL) 10 mg tablet Take 1 tablet (10 mg total) by mouth every 8 (eight) hours as needed for moderate pain (migraine/headache)  Patient not taking: Reported on 2022   PRIMO Brooks   penicillin V potassium (VEETID) 500 mg tablet Take 1 tablet (500 mg total) by mouth 4 (four) times a day for 7 days Do not start before 2023  Patient not taking: Reported on 2023  PRIMO Callahan   TiZANidine (ZANAFLEX) 4 MG capsule Take 1 capsule (4 mg total) by mouth 3 (three) times a day as needed for muscle spasms  Patient not taking: Reported on 2023 10/20/22   PRIMO Brooks     I have reviewed home medications with patient personally      Allergies: No Known Allergies    Social History:     Marital Status: Single   Occupation:   Patient Pre-hospital Living Situation:   Patient Pre-hospital Level of Mobility:   Patient Pre-hospital Diet Restrictions:   Substance Use History:   Social History     Substance and Sexual Activity   Alcohol Use Yes   • Alcohol/week: 3 0 standard drinks   • Types: 3 Cans of beer per week    Comment: socially     Social History     Tobacco Use   Smoking Status Former   • Packs/day: 0 50   • Types: Cigarettes   • Quit date: 2018   • Years since quittin 8   Smokeless Tobacco Former   • Types: Chew     Social History     Substance and Sexual Activity   Drug Use No       Family History:    Family History   Problem Relation Age of Onset   • Diabetes Mother    • Breast cancer Mother    • Diabetes Father    • Hypertension Father    • Diabetes Brother • Hypertension Brother    • Hypertension Maternal Grandfather    • Diabetes Maternal Grandfather    • Thyroid disease Maternal Grandfather    • Diabetes Paternal Grandmother    • Alzheimer's disease Paternal Grandfather    • Thyroid disease Maternal Uncle    • Alzheimer's disease Paternal Aunt        Physical Exam:     Vitals:   Blood Pressure: 132/90 (04/04/23 1312)  Pulse: 83 (04/04/23 1312)  Temperature: 98 8 °F (37 1 °C) (04/04/23 1004)  Temp Source: Oral (04/04/23 1004)  Respirations: 18 (04/04/23 1312)  SpO2: 97 % (04/04/23 1312)    Physical Exam    Constitutional: Pt appears comfortable  Not in any acute distress  HENT:   Head: Normocephalic and atraumatic  Eyes: EOM are normal    Neck: Neck supple  Right facial swelling and erythema +  Cardiovascular: Normal rate, regular rhythm, normal heart sounds  No murmur heard  Pulmonary/Chest: Effort normal, air entry b/l equal  No respiratory distress  Pt has no wheezes or crackles  Abdominal: Soft  Non-distended, Non-tender  Bowel sounds are normal    Musculoskeletal: Normal range of motion  Neurological: awake, alert  Moving all extremities spontaneously  Psychiatric: normal mood and affect  Additional Data:     Lab Results: I have personally reviewed pertinent reports        Results from last 7 days   Lab Units 04/04/23  1039   WBC Thousand/uL 8 70   HEMOGLOBIN g/dL 16 1   HEMATOCRIT % 50 4*   PLATELETS Thousands/uL 240   NEUTROS PCT % 69   LYMPHS PCT % 22   MONOS PCT % 6   EOS PCT % 2     Results from last 7 days   Lab Units 04/04/23  1039   SODIUM mmol/L 136   POTASSIUM mmol/L 4 2   CHLORIDE mmol/L 98   CO2 mmol/L 32   BUN mg/dL 8   CREATININE mg/dL 0 77   ANION GAP mmol/L 6   CALCIUM mg/dL 9 5   ALBUMIN g/dL 4 2   TOTAL BILIRUBIN mg/dL 0 74   ALK PHOS U/L 59   ALT U/L 37   AST U/L 16   GLUCOSE RANDOM mg/dL 111                 Results from last 7 days   Lab Units 04/04/23  1039   LACTIC ACID mmol/L 1 2       Imaging: I have personally reviewed pertinent reports  CT facial bones with contrast   Final Result by Gene Walter Ahumada, DO (04/04 1258)      Right facial cellulitis centered immediately superficial to the right paramedian aspect of the maxilla where there is a 1 2 cm soft tissue abscess superficial to periapical lucencies within teeth #5 and 6 suggesting dental origin of this infection  Workstation performed: [id number]         CT soft tissue neck with contrast   Final Result by Abel Stephens MD (04/04 1324)      1 3 x 0 8 cm subperiosteal abscess along right maxillary alveolar ridge adjacent to periodontal disease of maxillary right 1st-2nd premolar teeth, which are likely odontogenic sources of infection  Recommend dental consultation for further evaluation  Acute facial cellulitis in right inferior periorbital, right cheek, and right buccal spaces  Additional chronic/incidental findings as detailed above  The study was marked in [facility] for immediate notification  Workstation performed: [id number]             EKG, Pathology, and Other Studies Reviewed on Admission:   · EKG:     Allscripts / Epic Records Reviewed: Yes     ** Please Note: This note has been constructed using a voice recognition system   **

## 2023-04-05 ENCOUNTER — TRANSITIONAL CARE MANAGEMENT (OUTPATIENT)
Dept: FAMILY MEDICINE CLINIC | Facility: CLINIC | Age: 28
End: 2023-04-05

## 2023-04-05 NOTE — UTILIZATION REVIEW
Initial Clinical Review    Admission: Date/Time/Statement:   Admission Orders (From admission, onward)     Ordered        04/04/23 1333  INPATIENT ADMISSION  Once                      Orders Placed This Encounter   Procedures   • INPATIENT ADMISSION     Standing Status:   Standing     Number of Occurrences:   1     Order Specific Question:   Level of Care     Answer:   Med Surg [16]     Order Specific Question:   Estimated length of stay     Answer:   More than 2 Midnights     Order Specific Question:   Certification     Answer:   I certify that inpatient services are medically necessary for this patient for a duration of greater than two midnights  See H&P and MD Progress Notes for additional information about the patient's course of treatment  ED Arrival Information     Expected   -    Arrival   4/4/2023 09:54    Acuity   Urgent            Means of arrival   Walk-In    Escorted by   Self    Service   Hospitalist    Admission type   Emergency            Arrival complaint   Tooth Infection           Chief Complaint   Patient presents with   • Facial Swelling     Sent here by dentist for right sided tooth infection, told to come here for drainage  Seen here recently and put on abx and pain meds  Worsening right sided swelling since last night  Denies fevers/chills       Initial Presentation: 29 y o  male with hx sleep apnea who presents to ED  with worsening right-sided facial swelling for almost 1 week  Presented to ED few days ago, was discharged on oral antibiotics  Saw a dentist and an oral surgeon outpatient  Was prescribed antibiotics  Patient came in today because of significant increasing the right facial pain and swelling overnight  Reports trouble swallowing with cold things but otherwise does okay with warm food  On exam, normal breathing effort, has r sided facial swelling with erythema   Labs    CT shows 1 3 x 0 8 cm subperiosteal abscess R maxillary ridge, facila cellulitis , periodontal disease Maxillary R 1st, 2nd molars  Pt given IVF, IV analgesic, IV antiemetic, IV abx in ED,  PT admitted as Inpatient with dental abscess  Plan - OMFS consult, IV Unasyn, pain control  OMFS consult-  odontogenic R midface infection   #3/4 splinted crown TTP, no purulence or fistula, UR buccal vestibular swelling TTP   Neck is soft, trachea is midline, no gross cervical lymphadenopathy bilaterally      PLan - HOB elevation, soft diet, NPO at MN  IV Unasyn until d/c the d/c on Augmentin  Peridex mouth wash, salt water rinses  Good oral hygiene  Pt to be treated tomorrow in clinic @6641 for surgical intervention   Pt d/c'ed 4/4/23 to home/self care @1825 per pts preference   PO Augmentin   For outpt surgical intervention 4/5/23  ED Triage Vitals [04/04/23 1004]   Temperature Pulse Respirations Blood Pressure SpO2   98 8 °F (37 1 °C) 98 17 141/77 95 %      Temp Source Heart Rate Source Patient Position - Orthostatic VS BP Location FiO2 (%)   Oral Monitor -- Right arm --      Pain Score       5          Wt Readings from Last 1 Encounters:   04/01/23 (!) 179 kg (394 lb)     Additional Vital Signs:   Date/Time Temp Pulse Resp BP MAP (mmHg) SpO2 O2 Device   04/04/23 1528 -- -- 20 -- -- -- --   04/04/23 15:27:44 98 3 °F (36 8 °C) 95 -- 132/67 89 94 % --   04/04/23 1312 -- 83 18 132/90 -- 97 % --   04/04/23 1200 -- 82 18 114/61 79 93 % --   04/04/23 1130 -- 83 -- 111/60 78 91 %      Pertinent Labs/Diagnostic Test Results:   CT facial bones with contrast   Final Result by Ramsey Bauer DO (04/04 1258)      Right facial cellulitis centered immediately superficial to the right paramedian aspect of the maxilla where there is a 1 2 cm soft tissue abscess superficial to periapical lucencies within teeth #5 and 6 suggesting dental origin of this infection           Workstation performed: [id number]         CT soft tissue neck with contrast   Final Result by Rober Mercedes MD (04/04 1326)      1 3 x 0 8 cm subperiosteal abscess along right maxillary alveolar ridge adjacent to periodontal disease of maxillary right 1st-2nd premolar teeth, which are likely odontogenic sources of infection  Recommend dental consultation for further evaluation  Acute facial cellulitis in right inferior periorbital, right cheek, and right buccal spaces  Additional chronic/incidental findings as detailed above  The study was marked in [facility] for immediate notification  Workstation performed: [id number]               Results from last 7 days   Lab Units 04/04/23  1039   WBC Thousand/uL 8 70   HEMOGLOBIN g/dL 16 1   HEMATOCRIT % 50 4*   PLATELETS Thousands/uL 240   NEUTROS ABS Thousands/µL 6 04         Results from last 7 days   Lab Units 04/04/23  1039   SODIUM mmol/L 136   POTASSIUM mmol/L 4 2   CHLORIDE mmol/L 98   CO2 mmol/L 32   ANION GAP mmol/L 6   BUN mg/dL 8   CREATININE mg/dL 0 77   EGFR ml/min/1 73sq m 123   CALCIUM mg/dL 9 5     Results from last 7 days   Lab Units 04/04/23  1039   AST U/L 16   ALT U/L 37   ALK PHOS U/L 59   TOTAL PROTEIN g/dL 7 3   ALBUMIN g/dL 4 2   TOTAL BILIRUBIN mg/dL 0 74         Results from last 7 days   Lab Units 04/04/23  1039   GLUCOSE RANDOM mg/dL 111             No results found for: BETA-HYDROXYBUTYRATE                                       Results from last 7 days   Lab Units 04/04/23  1039   LACTIC ACID mmol/L 1 2                             Results from last 7 days   Lab Units 04/04/23  1039   CRP mg/L 75 8*               Results from last 7 days   Lab Units 04/04/23  1039   BLOOD CULTURE  Received in Microbiology Lab  Culture in Progress  Received in Microbiology Lab  Culture in Progress                 ED Treatment:   Medication Administration from 04/04/2023 0953 to 04/04/2023 1512       Date/Time Order Dose Route Action     04/04/2023 1041 EDT sodium chloride 0 9 % bolus 1,000 mL 1,000 mL Intravenous New Bag     04/04/2023 1040 EDT ondansetron (ZOFRAN) injection 4 "mg 4 mg Intravenous Given     04/04/2023 1044 EDT ampicillin-sulbactam (UNASYN) 3 g in sodium chloride 0 9 % 100 mL IVPB 3 g Intravenous New Bag     04/04/2023 1046 EDT ketorolac (TORADOL) injection 15 mg 15 mg Intravenous Given     04/04/2023 1230 EDT iohexol (OMNIPAQUE) 350 MG/ML injection (SINGLE-DOSE) 85 mL 85 mL Intravenous Given     04/04/2023 1327 EDT oxyCODONE (ROXICODONE) immediate release tablet 10 mg 10 mg Oral Given     04/04/2023 1443 EDT heparin (porcine) subcutaneous injection 7,500 Units 7,500 Units Subcutaneous Given        Past Medical History:   Diagnosis Date   • Ankle fracture     right   • Anxiety 09/27/2020   • Apnea 04/28/2020   • Class 3 severe obesity due to excess calories without serious comorbidity with body mass index (BMI) of 45 0 to 49 9 [facility] 04/28/2020   • Daytime somnolence 04/28/2020   • Depression    • Fall 5519-4212    \"fell off of a roof\"   • Mild intermittent asthma    • Nonintractable headache 04/28/2020   • Poor short term memory 04/28/2020   • Sleep apnea    • Snoring 04/28/2020     Present on Admission:  **None**      Admitting Diagnosis: Dental abscess [K04 7]  Tooth infection [K04 7]  Facial cellulitis [L03 211]  Age/Sex: 29 y o  male  Admission Orders:  Scheduled Medications:  ampicillin-sulbactam (UNASYN) 3 g in sodium chloride 0 9 % 100 mL IVPB  Dose: 3 g  Freq: Every 6 hours Route: IV  Last Dose: Stopped (04/04/23 1643)  Start: 04/04/23 1600 End: 04/04/23 2042      ampicillin-sulbactam (UNASYN) 3 g in sodium chloride 0 9 % 100 mL IVPB  Dose: 3 g  Freq:  Once Route: IV  Last Dose: Stopped (04/04/23 1209)  Start: 04/04/23 1030 End: 04/04/23 1209       heparin (porcine) subcutaneous injection 7,500 Units  Dose: 7,500 Units  Freq: Every 8 hours scheduled Route: SC  Start: 04/04/23 1415 End: 04/04/23 2042        Continuous IV Infusions:    PRN Meds:  ketorolac (TORADOL) injection 15 mg  Dose: 15 mg  Freq: Every 6 hours PRN Route: IV  PRN Reason: severe " pain  Start: 04/04/23 1408 End: 04/04/23 2042   x1 4/4       oxyCODONE (ROXICODONE) IR tablet 5 mg  Dose: 5 mg  Freq: Every 4 hours PRN Route: PO  PRN Reason: moderate pain  Start: 04/04/23 1408 End: 04/04/23 2042        OOB as peter   reg diet   SCD's   monitior airway    IP CONSULT TO ORAL AND MAXILLOFACIAL SURGERY    Network Utilization Review Department  ATTENTION: Please call with any questions or concerns to [phone] and carefully listen to the prompts so that you are directed to the right person  All voicemails are confidential   Gradie Musty all requests for admission clinical reviews, approved or denied determinations and any other requests to dedicated fax number below belonging to the campus where the patient is receiving treatment   List of dedicated fax numbers for the Facilities:  [address] DENIALS (Administrative/Medical Necessity) [phone]   [address] (Maternity/NICU/Pediatrics) [phone]   91 Rajwinder Cardenas [phone]   [facility] 77 [phone]   [address] [place] [address] [facility] [place] [address] [address] [facility] 28 [phone]   1553 [facility] Jya [place] Woods [facility] 134 815 [facility] [phone]

## 2023-04-09 LAB
BACTERIA BLD CULT: NORMAL
BACTERIA BLD CULT: NORMAL

## 2023-06-07 ENCOUNTER — TELEMEDICINE (OUTPATIENT)
Dept: FAMILY MEDICINE CLINIC | Facility: CLINIC | Age: 28
End: 2023-06-07
Payer: COMMERCIAL

## 2023-06-07 DIAGNOSIS — J06.9 ACUTE UPPER RESPIRATORY INFECTION: Primary | ICD-10-CM

## 2023-06-07 PROCEDURE — 99213 OFFICE O/P EST LOW 20 MIN: CPT | Performed by: NURSE PRACTITIONER

## 2023-06-07 RX ORDER — IBUPROFEN 800 MG/1
800 TABLET ORAL EVERY 6 HOURS PRN
COMMUNITY

## 2023-06-07 RX ORDER — ALBUTEROL SULFATE 90 UG/1
2 AEROSOL, METERED RESPIRATORY (INHALATION) EVERY 4 HOURS PRN
Qty: 6.7 G | Refills: 1 | Status: SHIPPED | OUTPATIENT
Start: 2023-06-07

## 2023-06-07 RX ORDER — BROMPHENIRAMINE MALEATE, PSEUDOEPHEDRINE HYDROCHLORIDE, AND DEXTROMETHORPHAN HYDROBROMIDE 2; 30; 10 MG/5ML; MG/5ML; MG/5ML
5 SYRUP ORAL 4 TIMES DAILY PRN
Qty: 120 ML | Refills: 0 | Status: SHIPPED | OUTPATIENT
Start: 2023-06-07

## 2023-06-07 RX ORDER — AZITHROMYCIN 250 MG/1
TABLET, FILM COATED ORAL
Qty: 6 TABLET | Refills: 0 | Status: SHIPPED | OUTPATIENT
Start: 2023-06-07 | End: 2023-06-11

## 2023-06-07 NOTE — LETTER
June 7, 2023     Patient: Altaf Lucero  YOB: 1995  Date of Visit: 6/7/2023      To Whom it May Concern:    Rehan Huggins is under my professional care  Sammi Hamm was seen in my office on 6/7/2023  Sammi Hamm may return to work on 6/8/2023  Unable to work 6/5-6/7/23 due to illness   If you have any questions or concerns, please don't hesitate to call           Sincerely,          PRIMO López        CC: No Recipients

## 2023-06-07 NOTE — PROGRESS NOTES
Virtual Regular Visit    Verification of patient location:    Patient is located at Home in the following state in which I hold an active license [place]      Assessment/Plan:    Problem List Items Addressed This Visit    None  Visit Diagnoses     Acute upper respiratory infection    -  Primary    Relevant Medications    albuterol (Ventolin HFA) 90 mcg/act inhaler    brompheniramine-pseudoephedrine-DM 30-2-10 MG/5ML syrup    azithromycin (ZITHROMAX) 250 mg tablet          Depression Screening Follow-up Plan: Patient's depression screening was positive with a PHQ-2 score of   Their PHQ-9 score was 0  Patient assessed for underlying major depression  They have no active suicidal ideations  Brief counseling provided and recommend additional follow-up/re-evaluation next office visit  BMI Counseling: There is no height or weight on file to calculate BMI  The BMI is above normal  Nutrition recommendations include decreasing portion sizes, encouraging healthy choices of fruits and vegetables, decreasing fast food intake, consuming healthier snacks, limiting drinks that contain sugar, moderation in carbohydrate intake, increasing intake of lean protein, reducing intake of saturated and trans fat and reducing intake of cholesterol  Exercise recommendations include moderate physical activity 150 minutes/week, exercising 3-5 times per week and strength training exercises  No pharmacotherapy was ordered  Rationale for BMI follow-up plan is due to patient being overweight or obese  Reason for visit is   Chief Complaint   Patient presents with   • Virtual Regular Visit   • Cold Like Symptoms        Encounter provider PRIMO Gottlieb    Provider located at [place] [address] [id number] [facility] [id number]      Recent Visits  No visits were found meeting these conditions    Showing recent visits within past 7 days and meeting all other requirements  Today's Visits  Date Type "Provider Dept   06/07/23 Telemedicine PRIMO Waldron [facility]   Showing today's visits and meeting all other requirements  Future Appointments  No visits were found meeting these conditions  Showing future appointments within next 150 days and meeting all other requirements       The patient was identified by name and date of birth  Madyson Linares was informed that this is a telemedicine visit and that the visit is being conducted through the [employer]  He agrees to proceed     My office door was closed  No one else was in the room  He acknowledged consent and understanding of privacy and security of the video platform  The patient has agreed to participate and understands they can discontinue the visit at any time  Patient is aware this is a billable service  Subjective  Madyson Linares is a 29 y o  male          Being seen for cold type symptoms  Started 3 to 4 days ago  Laryngitis, cough, pnd, sob, lightheaded  Just does not feel well  Used ibuprofen and hyquil  No work this week         Past Medical History:   Diagnosis Date   • Ankle fracture     right   • Anxiety 09/27/2020   • Apnea 04/28/2020   • Class 3 severe obesity due to excess calories without serious comorbidity with body mass index (BMI) of 45 0 to 49 9 in adult [facility] 04/28/2020   • Daytime somnolence 04/28/2020   • Depression    • Fall [id number]    \"fell off of a roof\"   • Mild intermittent asthma    • Nonintractable headache 04/28/2020   • Poor short term memory 04/28/2020   • Sleep apnea    • Snoring 04/28/2020       Past Surgical History:   Procedure Laterality Date   • TOOTH EXTRACTION         Current Outpatient Medications   Medication Sig Dispense Refill   • albuterol (Ventolin HFA) 90 mcg/act inhaler Inhale 2 puffs every 4 (four) hours as needed for wheezing or shortness of breath 6 7 g 1   • azithromycin (ZITHROMAX) 250 mg tablet Take 2 tablets today then 1 tablet daily x 4 days 6 tablet 0   • " brompheniramine-pseudoephedrine-DM 30-2-10 MG/5ML syrup Take 5 mL by mouth 4 (four) times a day as needed for congestion or cough 120 mL 0   • ibuprofen (MOTRIN) 800 mg tablet Take 800 mg by mouth every 6 (six) hours as needed for mild pain     • oxyCODONE (Roxicodone) 5 immediate release tablet Take 1 tablet (5 mg total) by mouth every 6 (six) hours as needed for moderate pain for up to 4 doses Max Daily Amount: 20 mg (Patient not taking: Reported on 6/7/2023) 4 tablet 0     No current facility-administered medications for this visit  No Known Allergies    Review of Systems   Constitutional: Positive for fatigue  Negative for chills and fever  HENT: Positive for congestion, postnasal drip, rhinorrhea and voice change  Negative for ear pain, sore throat and trouble swallowing  Eyes: Negative for photophobia and visual disturbance  Respiratory: Positive for cough, shortness of breath and wheezing  Cardiovascular: Negative for chest pain and palpitations  Gastrointestinal: Negative for constipation, diarrhea, nausea and vomiting  Genitourinary: Negative for dysuria and frequency  Musculoskeletal: Positive for myalgias  Negative for arthralgias  Skin: Negative for rash  Neurological: Positive for light-headedness  Psychiatric/Behavioral: Negative for dysphoric mood and suicidal ideas  The patient is not nervous/anxious  Video Exam    Vitals:       Physical Exam  Constitutional:       Appearance: He is ill-appearing  HENT:      Head: Normocephalic and atraumatic  Eyes:      Conjunctiva/sclera: Conjunctivae normal    Pulmonary:      Effort: Pulmonary effort is normal    Musculoskeletal:         General: Normal range of motion  Cervical back: Normal range of motion  Neurological:      Mental Status: He is alert and oriented to person, place, and time     Psychiatric:         Mood and Affect: Mood normal           Visit Time  Total Visit Duration: 15

## 2023-06-22 ENCOUNTER — APPOINTMENT (OUTPATIENT)
Dept: URGENT CARE | Facility: MEDICAL CENTER | Age: 28
End: 2023-06-22
Payer: OTHER MISCELLANEOUS

## 2023-06-22 PROCEDURE — G0382 LEV 3 HOSP TYPE B ED VISIT: HCPCS | Performed by: PHYSICIAN ASSISTANT

## 2023-06-22 PROCEDURE — 99283 EMERGENCY DEPT VISIT LOW MDM: CPT | Performed by: PHYSICIAN ASSISTANT

## 2023-06-22 PROCEDURE — 90715 TDAP VACCINE 7 YRS/> IM: CPT

## 2023-06-22 PROCEDURE — 12002 RPR S/N/AX/GEN/TRNK2.6-7.5CM: CPT | Performed by: PHYSICIAN ASSISTANT

## 2023-06-22 PROCEDURE — 90471 IMMUNIZATION ADMIN: CPT | Performed by: PHYSICIAN ASSISTANT

## 2023-06-24 ENCOUNTER — APPOINTMENT (OUTPATIENT)
Dept: URGENT CARE | Facility: MEDICAL CENTER | Age: 28
End: 2023-06-24
Payer: OTHER MISCELLANEOUS

## 2023-06-24 PROCEDURE — 99213 OFFICE O/P EST LOW 20 MIN: CPT | Performed by: PHYSICIAN ASSISTANT

## 2023-06-29 ENCOUNTER — APPOINTMENT (OUTPATIENT)
Dept: URGENT CARE | Facility: MEDICAL CENTER | Age: 28
End: 2023-06-29

## 2023-08-16 ENCOUNTER — HOSPITAL ENCOUNTER (EMERGENCY)
Facility: HOSPITAL | Age: 28
Discharge: HOME | End: 2023-08-16
Attending: EMERGENCY MEDICINE | Admitting: EMERGENCY MEDICINE
Payer: COMMERCIAL

## 2023-08-16 VITALS
RESPIRATION RATE: 20 BRPM | DIASTOLIC BLOOD PRESSURE: 78 MMHG | SYSTOLIC BLOOD PRESSURE: 132 MMHG | TEMPERATURE: 98.1 F | HEART RATE: 92 BPM | OXYGEN SATURATION: 95 %

## 2023-08-16 DIAGNOSIS — L02.91 ABSCESS: Primary | ICD-10-CM

## 2023-08-16 PROCEDURE — 99283 EMERGENCY DEPT VISIT LOW MDM: CPT

## 2023-08-16 RX ORDER — SULFAMETHOXAZOLE AND TRIMETHOPRIM 800; 160 MG/1; MG/1
1 TABLET ORAL 2 TIMES DAILY
Qty: 14 TABLET | Refills: 0 | Status: SHIPPED | OUTPATIENT
Start: 2023-08-16 | End: 2023-08-23

## 2023-08-16 NOTE — ED ATTENDING ATTESTATION
8/16/2023  Yazan Durbin DO, saw and evaluated the patient. I have discussed the patient with the resident/non-physician practitioner and agree with the resident's/non-physician practitioner's findings, Plan of Care, and MDM as documented in the resident's/non-physician practitioner's note, except where noted. All available labs and Radiology studies were reviewed. I was present for key portions of any procedure(s) performed by the resident/non-physician practitioner and I was immediately available to provide assistance. At this point I agree with the current assessment done in the Emergency Department. I have conducted an independent evaluation of this patient a history and physical is as follows:    ED Course   27-year-old male presents for evaluation of left-sided posterior neck area fullness. Patient has history of abscess. On my exam there is a concern for early infection, superficial.  Area is not ready to be incised. There is no underlying fluctuance. We will start the patient on Bactrim and recommend prompt follow-up with family medicine doctor. Patient agreed with the plan verbalized understanding. Extensive return precautions provided.       Critical Care Time  Procedures

## 2023-08-23 NOTE — ED PROVIDER NOTES
History  Chief Complaint   Patient presents with   • Abscess     Patient reports 2 or 3 abscesses x3 days on back of neck. Similar abscesses approx 7 months ago but they went away on their own. Per patient he needs to see specialist because there were more under skin but he did not go. HPI    29year old male with hx of obseity, OTF, presents with neck abscesses. Per pt, he had similar abscesses about 8 months ago, which drained on their own. He reports coming to the ED at the time, and he was sent to see a specialist as they could not be drained in the department. This episode began about 3 days ago, and he feels discomfort on neck extension, but has full ROM. Denies fever, chills, SOB, headache, drainage from neck, neck stiffness. On exam, he is in no acute distress, VS stable. Full neck ROM, no neck tenderness. 1- 0.5cm nodule on the left posterior aspect of neck, with no tenderness, redness around the area. other visible or palpable masses or cysts. Prior to Admission Medications   Prescriptions Last Dose Informant Patient Reported? Taking?    albuterol (Ventolin HFA) 90 mcg/act inhaler   No No   Sig: Inhale 2 puffs every 4 (four) hours as needed for wheezing or shortness of breath   brompheniramine-pseudoephedrine-DM 30-2-10 MG/5ML syrup   No No   Sig: Take 5 mL by mouth 4 (four) times a day as needed for congestion or cough   ibuprofen (MOTRIN) 800 mg tablet  Self Yes No   Sig: Take 800 mg by mouth every 6 (six) hours as needed for mild pain   oxyCODONE (Roxicodone) 5 immediate release tablet   No No   Sig: Take 1 tablet (5 mg total) by mouth every 6 (six) hours as needed for moderate pain for up to 4 doses Max Daily Amount: 20 mg   Patient not taking: Reported on 6/7/2023      Facility-Administered Medications: None       Past Medical History:   Diagnosis Date   • Ankle fracture     right   • Anxiety 09/27/2020   • Apnea 04/28/2020   • Class 3 severe obesity due to excess calories without serious comorbidity with body mass index (BMI) of 45.0 to 49.9 in adult [facility]) 2020   • Daytime somnolence 2020   • Depression    • Fall [id number]    "fell off of a roof"   • Mild intermittent asthma    • Nonintractable headache 2020   • Poor short term memory 2020   • Sleep apnea    • Snoring 2020       Past Surgical History:   Procedure Laterality Date   • TOOTH EXTRACTION         Family History   Problem Relation Age of Onset   • Diabetes Mother    • Breast cancer Mother    • Diabetes Father    • Hypertension Father    • Diabetes Brother    • Hypertension Brother    • Hypertension Maternal Grandfather    • Diabetes Maternal Grandfather    • Thyroid disease Maternal Grandfather    • Diabetes Paternal Grandmother    • Alzheimer's disease Paternal Grandfather    • Thyroid disease Maternal Uncle    • Alzheimer's disease Paternal Aunt      I have reviewed and agree with the history as documented. E-Cigarette/Vaping   • E-Cigarette Use Never User      E-Cigarette/Vaping Substances   • Nicotine No    • THC No    • CBD No    • Flavoring No    • Other No    • Unknown No      Social History     Tobacco Use   • Smoking status: Former     Packs/day: 0.50     Types: Cigarettes     Quit date: 2018     Years since quittin.2     Passive exposure: Never   • Smokeless tobacco: Former     Types: Chew   Vaping Use   • Vaping Use: Never used   Substance Use Topics   • Alcohol use: Yes     Alcohol/week: 3.0 standard drinks of alcohol     Types: 3 Cans of beer per week     Comment: socially   • Drug use: No        Review of Systems   Constitutional: Negative for chills and fever. HENT: Negative for rhinorrhea and sore throat. Respiratory: Negative for cough and shortness of breath. Cardiovascular: Negative for chest pain. Gastrointestinal: Negative for abdominal pain, nausea and vomiting. Musculoskeletal: Negative for arthralgias and back pain. Skin: Negative for color change. Neurological: Negative for dizziness and headaches. Physical Exam  ED Triage Vitals [08/16/23 1541]   Temperature Pulse Respirations Blood Pressure SpO2   98.1 °F (36.7 °C) 92 20 132/78 95 %      Temp Source Heart Rate Source Patient Position - Orthostatic VS BP Location FiO2 (%)   Oral Monitor -- Right arm --      Pain Score       5             Orthostatic Vital Signs  Vitals:    08/16/23 1541   BP: 132/78   Pulse: 92       Physical Exam  Vitals and nursing note reviewed. Constitutional:       General: He is not in acute distress. Appearance: He is well-developed. HENT:      Head: Normocephalic and atraumatic. Right Ear: Tympanic membrane normal.      Left Ear: Tympanic membrane normal.   Eyes:      Conjunctiva/sclera: Conjunctivae normal.      Pupils: Pupils are equal, round, and reactive to light. Neck:     Cardiovascular:      Rate and Rhythm: Normal rate and regular rhythm. Heart sounds: No murmur heard. Pulmonary:      Effort: Pulmonary effort is normal. No respiratory distress. Breath sounds: Normal breath sounds. Abdominal:      Palpations: Abdomen is soft. Tenderness: There is no abdominal tenderness. Musculoskeletal:         General: No swelling. Cervical back: Normal range of motion and neck supple. No rigidity. Skin:     General: Skin is warm and dry. Capillary Refill: Capillary refill takes less than 2 seconds. Neurological:      General: No focal deficit present. Mental Status: He is alert. Psychiatric:         Mood and Affect: Mood normal.         ED Medications  Medications - No data to display    Diagnostic Studies  Results Reviewed     None                 No orders to display         Procedures  Procedures      ED Course  ED Course as of 08/23/23 1116   Wed Aug 16, 2023   127 29year old male with hx of obseity, OTF, presents with neck abscesses. Per pt, he had similar abscesses about 8 months ago, which drained on their own.  He reports coming to the ED at the time, and he was sent to see a specialist as they could not be drained in the department. This episode began about 3 days ago, and he feels discomfort on neck extension, but has full ROM. Denies fever, chills, SOB, headache, drainage from neck, neck stiffness. On exam, he is in no acute distress, VS stable. Full neck ROM, no neck tenderness. 1- 0.5cm nodule on the left posterior aspect of neck, with no tenderness, redness around the area. other visible or palpable masses or cysts. 1637 On examination, no abscess seen on neck, and pt has no systemic symptoms (fever, chills,etc). Will send pt home with Bactrim BID x7 days for possible early abscess, and have him follow up with PCP. Medical Decision Making  See ED course. Risk  Prescription drug management. Disposition  Final diagnoses:   Abscess     Time reflects when diagnosis was documented in both MDM as applicable and the Disposition within this note     Time User Action Codes Description Comment    8/16/2023  4:33 PM Loida Grant Add [L02.91] Abscess       ED Disposition     ED Disposition   Discharge    Condition   Stable    Date/Time   Wed Aug 16, 2023  4:34 PM    Comment   Timbo Mejias discharge to home/self care.                Follow-up Information     Follow up With Specialties Details Why Contact Info    Larry Watson, [facility], Internal Medicine, Nurse Practitioner Schedule an appointment as soon as possible for a visit   [address]  [phone]            Discharge Medication List as of 8/16/2023  4:37 PM      START taking these medications    Details   sulfamethoxazole-trimethoprim (BACTRIM DS) 800-160 mg per tablet Take 1 tablet by mouth 2 (two) times a day for 7 days smx-tmp DS (BACTRIM) 800-160 mg tabs (1tab q12 D10), Starting Wed 8/16/2023, Until Wed 8/23/2023, Normal         CONTINUE these medications which have NOT CHANGED    Details   albuterol (Ventolin HFA) 90 mcg/act inhaler Inhale 2 puffs every 4 (four) hours as needed for wheezing or shortness of breath, Starting Wed 6/7/2023, Normal      brompheniramine-pseudoephedrine-DM 30-2-10 MG/5ML syrup Take 5 mL by mouth 4 (four) times a day as needed for congestion or cough, Starting Wed 6/7/2023, Normal      ibuprofen (MOTRIN) 800 mg tablet Take 800 mg by mouth every 6 (six) hours as needed for mild pain, Historical Med      oxyCODONE (Roxicodone) 5 immediate release tablet Take 1 tablet (5 mg total) by mouth every 6 (six) hours as needed for moderate pain for up to 4 doses Max Daily Amount: 20 mg, Starting Sat 4/1/2023, Print           No discharge procedures on file. PDMP Review       Value Time User    PDMP Reviewed  Yes 4/1/2023  9:23 PM [facility], [address] [place] [address]           ED Provider  Attending physically available and evaluated Vick Junior. I managed the patient along with the ED Attending.     Electronically Signed by         Dilma Sommers MD  08/23/23 6071

## 2023-08-28 ENCOUNTER — OFFICE VISIT (OUTPATIENT)
Dept: URGENT CARE | Age: 28
End: 2023-08-28
Payer: COMMERCIAL

## 2023-08-28 ENCOUNTER — TELEPHONE (OUTPATIENT)
Dept: FAMILY MEDICINE CLINIC | Facility: CLINIC | Age: 28
End: 2023-08-28

## 2023-08-28 VITALS
DIASTOLIC BLOOD PRESSURE: 71 MMHG | SYSTOLIC BLOOD PRESSURE: 132 MMHG | HEART RATE: 107 BPM | TEMPERATURE: 97.8 F | RESPIRATION RATE: 20 BRPM | OXYGEN SATURATION: 94 %

## 2023-08-28 DIAGNOSIS — B35.3 TINEA PEDIS OF BOTH FEET: ICD-10-CM

## 2023-08-28 DIAGNOSIS — L30.9 DERMATITIS: Primary | ICD-10-CM

## 2023-08-28 PROCEDURE — 99213 OFFICE O/P EST LOW 20 MIN: CPT | Performed by: PHYSICIAN ASSISTANT

## 2023-08-28 RX ORDER — CLOTRIMAZOLE AND BETAMETHASONE DIPROPIONATE 10; .64 MG/G; MG/G
CREAM TOPICAL 2 TIMES DAILY
Qty: 45 G | Refills: 1 | Status: SHIPPED | OUTPATIENT
Start: 2023-08-28

## 2023-08-28 NOTE — PROGRESS NOTES
[facility]        NAME: Hubert Becerril is a 29 y.o. male  : 1995    MRN: [id number]  DATE: 2023  TIME: 6:16 PM    Assessment and Plan   Dermatitis [L30.9]  1. Dermatitis  clotrimazole-betamethasone (LOTRISONE) 1-0.05 % cream      2. Tinea pedis of both feet  clotrimazole-betamethasone (LOTRISONE) 1-0.05 % cream            Patient Instructions     1. Dry both feet thoroughly after showering. 2.  Change socks at least twice a day during work. 3.  Follow-up with podiatry in 2 weeks if the rash is not significantly improved or resolved. 4.  Return here immediately for any worsening symptoms. Chief Complaint     Chief Complaint   Patient presents with   • Rash     Patient states that he noticed a small rash on the top of both of his feet. He states that they have increased in number but denies pain. Slight discomfort when he touches the area. History of Present Illness       26-year-old male patient with approximate 2-week history of persistent irritating, mildly itchy red rash on the dorsums of both feet. Patient denies any previous history of the same. Denies any new detergents, soaps, lotions, etc.  Patient denies any symptoms between the toes or on the plantar aspect of the foot. Patient denies any rash anywhere else on the body. Denies having tried any over-the-counter medications. Rash  Pertinent negatives include no cough, fever, shortness of breath, sore throat or vomiting. Review of Systems   Review of Systems   Constitutional: Negative for chills and fever. HENT: Negative for ear pain and sore throat. Eyes: Negative for pain and visual disturbance. Respiratory: Negative for cough and shortness of breath. Cardiovascular: Negative for chest pain and palpitations. Gastrointestinal: Negative for abdominal pain and vomiting. Genitourinary: Negative for dysuria and hematuria. Musculoskeletal: Negative for arthralgias and back pain.    Skin: Positive for rash. Negative for color change. Neurological: Negative for seizures and syncope. All other systems reviewed and are negative.         Current Medications       Current Outpatient Medications:   •  albuterol (Ventolin HFA) 90 mcg/act inhaler, Inhale 2 puffs every 4 (four) hours as needed for wheezing or shortness of breath, Disp: 6.7 g, Rfl: 1  •  clotrimazole-betamethasone (LOTRISONE) 1-0.05 % cream, Apply topically 2 (two) times a day Apply to bilateral foot rash twice daily for the next 2 weeks or until resolved., Disp: 45 g, Rfl: 1  •  brompheniramine-pseudoephedrine-DM 30-2-10 MG/5ML syrup, Take 5 mL by mouth 4 (four) times a day as needed for congestion or cough (Patient not taking: Reported on 8/28/2023), Disp: 120 mL, Rfl: 0  •  ibuprofen (MOTRIN) 800 mg tablet, Take 800 mg by mouth every 6 (six) hours as needed for mild pain (Patient not taking: Reported on 8/28/2023), Disp: , Rfl:   •  oxyCODONE (Roxicodone) 5 immediate release tablet, Take 1 tablet (5 mg total) by mouth every 6 (six) hours as needed for moderate pain for up to 4 doses Max Daily Amount: 20 mg (Patient not taking: Reported on 6/7/2023), Disp: 4 tablet, Rfl: 0    Current Allergies     Allergies as of 08/28/2023   • (No Known Allergies)            The following portions of the patient's history were reviewed and updated as appropriate: allergies, current medications, past family history, past medical history, past social history, past surgical history and problem list.     Past Medical History:   Diagnosis Date   • Ankle fracture     right   • Anxiety 09/27/2020   • Apnea 04/28/2020   • Class 3 severe obesity due to excess calories without serious comorbidity with body mass index (BMI) of 45.0 to 49.9 in adult [facility]) 04/28/2020   • Daytime somnolence 04/28/2020   • Depression    • Fall [id number]    "fell off of a roof"   • Mild intermittent asthma    • Nonintractable headache 04/28/2020   • Poor short term memory 04/28/2020   • Sleep apnea    • Snoring 04/28/2020       Past Surgical History:   Procedure Laterality Date   • TOOTH EXTRACTION         Family History   Problem Relation Age of Onset   • Diabetes Mother    • Breast cancer Mother    • Diabetes Father    • Hypertension Father    • Diabetes Brother    • Hypertension Brother    • Hypertension Maternal Grandfather    • Diabetes Maternal Grandfather    • Thyroid disease Maternal Grandfather    • Diabetes Paternal Grandmother    • Alzheimer's disease Paternal Grandfather    • Thyroid disease Maternal Uncle    • Alzheimer's disease Paternal Aunt          Medications have been verified. Objective   /71   Pulse (!) 107   Temp 97.8 °F (36.6 °C)   Resp 20   SpO2 94%        Physical Exam     Physical Exam  Vitals and nursing note reviewed. Constitutional:       General: He is not in acute distress. Appearance: Normal appearance. He is not ill-appearing. HENT:      Head: Normocephalic. Nose: Nose normal.      Mouth/Throat:      Mouth: Mucous membranes are dry. Pharynx: Oropharynx is clear. Eyes:      Conjunctiva/sclera: Conjunctivae normal.      Pupils: Pupils are equal, round, and reactive to light. Cardiovascular:      Rate and Rhythm: Normal rate and regular rhythm. Pulses: Normal pulses. Pulmonary:      Effort: Pulmonary effort is normal.      Breath sounds: Normal breath sounds. Musculoskeletal:         General: Normal range of motion. Cervical back: Normal range of motion and neck supple. Skin:     General: Skin is warm and dry. Capillary Refill: Capillary refill takes less than 2 seconds. Findings: Rash present. Comments: Nonspecific red, raised, somewhat excoriated, papular rash noted to the dorsums of both feet. Slightly scaly appearance. No discharge or wounds. Surrounding redness, streaking. No foot swelling. Intertriginous areas of the toes are clear. Plantar aspects of the feet are clear.    Neurological: Mental Status: He is alert and oriented to person, place, and time. Psychiatric:         Mood and Affect: Mood normal.         Behavior: Behavior normal.           Medical decision making note:  I suspect atypical tinea pedis versus allergic/irritant dermatitis and will treat with a combination antifungal/steroid cream.  Patient to follow-up with podiatry in 2 weeks if not improving and to return here or go to the ER immediately for any significantly worsening symptoms.

## 2023-08-28 NOTE — TELEPHONE ENCOUNTER
Patients significant other called and stated that Friday he woke up with a rash all over his feet, and she wanted to know if there is away you can squeeze him in this week,if you can't she wanted to know if you can order blood work for him, she thinks it could be a diabetes rash as diabetes runs in his family, he does have an appt on 9/7 for and ER follow up.  Please advise

## 2023-08-28 NOTE — PATIENT INSTRUCTIONS
1.  Dry both feet thoroughly after showering. 2.  Change socks at least twice a day during work. 3.  Follow-up with podiatry in 2 weeks if the rash is not significantly improved or resolved. 4.  Return here immediately for any worsening symptoms.

## 2023-08-28 NOTE — LETTER
August 28, 2023     Patient: Melida Rousseau   YOB: 1995   Date of Visit: 8/28/2023       To Whom It May Concern: It is my medical opinion that Dot Meckel should be excuse for his absence from work on 8/28/2023. If you have any questions or concerns, please don't hesitate to call.          Sincerely,        Kenroy Ramirez PA-C    CC: No Recipients

## 2023-08-31 DIAGNOSIS — R73.9 ELEVATED BLOOD SUGAR: Primary | ICD-10-CM

## 2023-09-07 ENCOUNTER — OFFICE VISIT (OUTPATIENT)
Dept: FAMILY MEDICINE CLINIC | Facility: CLINIC | Age: 28
End: 2023-09-07
Payer: COMMERCIAL

## 2023-09-07 ENCOUNTER — APPOINTMENT (OUTPATIENT)
Dept: LAB | Facility: CLINIC | Age: 28
End: 2023-09-07
Payer: COMMERCIAL

## 2023-09-07 VITALS
HEART RATE: 88 BPM | RESPIRATION RATE: 16 BRPM | SYSTOLIC BLOOD PRESSURE: 130 MMHG | DIASTOLIC BLOOD PRESSURE: 80 MMHG | TEMPERATURE: 97.7 F | OXYGEN SATURATION: 95 % | BODY MASS INDEX: 54.75 KG/M2 | WEIGHT: 315 LBS

## 2023-09-07 DIAGNOSIS — B35.3 TINEA PEDIS OF BOTH FEET: Primary | ICD-10-CM

## 2023-09-07 DIAGNOSIS — R73.9 ELEVATED BLOOD SUGAR: ICD-10-CM

## 2023-09-07 PROBLEM — R20.2 NUMBNESS AND TINGLING IN BOTH HANDS: Status: RESOLVED | Noted: 2021-01-01 | Resolved: 2023-09-07

## 2023-09-07 PROBLEM — J02.9 PHARYNGITIS: Status: RESOLVED | Noted: 2022-09-29 | Resolved: 2023-09-07

## 2023-09-07 PROBLEM — M62.838 MUSCLE SPASM: Status: RESOLVED | Noted: 2022-03-29 | Resolved: 2023-09-07

## 2023-09-07 PROBLEM — I88.9 ADENITIS: Status: RESOLVED | Noted: 2021-08-23 | Resolved: 2023-09-07

## 2023-09-07 PROBLEM — M54.50 ACUTE BILATERAL LOW BACK PAIN WITHOUT SCIATICA: Status: RESOLVED | Noted: 2021-05-25 | Resolved: 2023-09-07

## 2023-09-07 PROBLEM — R20.0 NUMBNESS AND TINGLING IN BOTH HANDS: Status: RESOLVED | Noted: 2021-01-01 | Resolved: 2023-09-07

## 2023-09-07 LAB
ALBUMIN SERPL BCP-MCNC: 4.4 G/DL (ref 3.5–5)
ALP SERPL-CCNC: 59 U/L (ref 34–104)
ALT SERPL W P-5'-P-CCNC: 39 U/L (ref 7–52)
ANION GAP SERPL CALCULATED.3IONS-SCNC: 4 MMOL/L
AST SERPL W P-5'-P-CCNC: 26 U/L (ref 13–39)
BILIRUB SERPL-MCNC: 0.71 MG/DL (ref 0.2–1)
BUN SERPL-MCNC: 11 MG/DL (ref 5–25)
CALCIUM SERPL-MCNC: 9.6 MG/DL (ref 8.4–10.2)
CHLORIDE SERPL-SCNC: 102 MMOL/L (ref 96–108)
CO2 SERPL-SCNC: 32 MMOL/L (ref 21–32)
CREAT SERPL-MCNC: 0.73 MG/DL (ref 0.6–1.3)
EST. AVERAGE GLUCOSE BLD GHB EST-MCNC: 117 MG/DL
GFR SERPL CREATININE-BSD FRML MDRD: 126 ML/MIN/1.73SQ M
GLUCOSE P FAST SERPL-MCNC: 89 MG/DL (ref 65–99)
HBA1C MFR BLD: 5.7 %
POTASSIUM SERPL-SCNC: 4.6 MMOL/L (ref 3.5–5.3)
PROT SERPL-MCNC: 7.4 G/DL (ref 6.4–8.4)
SODIUM SERPL-SCNC: 138 MMOL/L (ref 135–147)

## 2023-09-07 PROCEDURE — 99213 OFFICE O/P EST LOW 20 MIN: CPT | Performed by: NURSE PRACTITIONER

## 2023-09-07 PROCEDURE — 83036 HEMOGLOBIN GLYCOSYLATED A1C: CPT

## 2023-09-07 PROCEDURE — 36415 COLL VENOUS BLD VENIPUNCTURE: CPT

## 2023-09-07 PROCEDURE — 80053 COMPREHEN METABOLIC PANEL: CPT

## 2023-09-07 RX ORDER — KETOCONAZOLE 20 MG/G
CREAM TOPICAL DAILY
Qty: 60 G | Refills: 3 | Status: SHIPPED | OUTPATIENT
Start: 2023-09-07

## 2023-09-07 NOTE — PROGRESS NOTES
FAMILY PRACTICE OFFICE VISIT       NAME: Arabella Mai  AGE: 29 y.o. SEX: male       : 1995        MRN: [id number]    DATE: 2023  TIME: 4:13 PM    Assessment and Plan   1. Tinea pedis of both feet  -     ketoconazole (NIZORAL) 2 % cream; Apply topically daily                 Chief Complaint     Chief Complaint   Patient presents with   • Follow-up     Ed follow up from cyst back of neck and rash on top of diabetic foot       History of Present Illness   Arabella Mai is a 29y.o.-year-old male who is here for ED follow up for abscess that was on back of neck  It is all healed  He feels well  Has rash on feet  Been there for months  Used otc cream, not resolving  Labs reviewed      Review of Systems   Review of Systems   Constitutional: Negative for fatigue and fever. HENT: Negative for congestion, postnasal drip and rhinorrhea. Eyes: Negative for photophobia and visual disturbance. Respiratory: Negative for cough, shortness of breath and wheezing. Cardiovascular: Negative for chest pain and palpitations. Gastrointestinal: Negative for constipation, diarrhea, nausea and vomiting. Genitourinary: Negative for dysuria and frequency. Musculoskeletal: Negative for arthralgias and myalgias. Skin: Positive for rash. Neurological: Negative for dizziness, light-headedness and headaches. Hematological: Negative for adenopathy. Psychiatric/Behavioral: Negative for dysphoric mood and sleep disturbance. The patient is not nervous/anxious.         Active Problem List     Patient Active Problem List   Diagnosis   • Mild intermittent asthma without complication   • IFG (impaired fasting glucose)   • Morbid obesity (HCC)   • Sleep apnea   • Primary narcolepsy without cataplexy   • Dysthymia   • Tinea pedis of both feet         Past Medical History:  Past Medical History:   Diagnosis Date   • Ankle fracture     right   • Anxiety 2020   • Apnea 2020   • Class 3 severe obesity due to excess calories without serious comorbidity with body mass index (BMI) of 45.0 to 49.9 in adult [facility]) 2020   • Daytime somnolence 2020   • Depression    • Fall [id number]    "fell off of a roof"   • Mild intermittent asthma    • Nonintractable headache 2020   • Poor short term memory 2020   • Sleep apnea    • Snoring 2020       Past Surgical History:  Past Surgical History:   Procedure Laterality Date   • TOOTH EXTRACTION         Family History:  Family History   Problem Relation Age of Onset   • Diabetes Mother    • Breast cancer Mother    • Diabetes Father    • Hypertension Father    • Diabetes Brother    • Hypertension Brother    • Hypertension Maternal Grandfather    • Diabetes Maternal Grandfather    • Thyroid disease Maternal Grandfather    • Diabetes Paternal Grandmother    • Alzheimer's disease Paternal Grandfather    • Thyroid disease Maternal Uncle    • Alzheimer's disease Paternal Aunt        Social History:  Social History     Socioeconomic History   • Marital status: Single     Spouse name: Not on file   • Number of children: Not on file   • Years of education: Not on file   • Highest education level: Not on file   Occupational History   • Not on file   Tobacco Use   • Smoking status: Former     Packs/day: 0.50     Types: Cigarettes     Quit date: 2018     Years since quittin.2     Passive exposure: Never   • Smokeless tobacco: Former     Types: Chew   Vaping Use   • Vaping Use: Never used   Substance and Sexual Activity   • Alcohol use:  Yes     Alcohol/week: 3.0 standard drinks of alcohol     Types: 3 Cans of beer per week     Comment: socially   • Drug use: No   • Sexual activity: Yes     Partners: Female   Other Topics Concern   • Not on file   Social History Narrative    Daily coffee consumption:1 cup a day        Most recent tobacco use screenin2018     Social Determinants of Health     Financial Resource Strain: Not on file   Food Insecurity: Not on file Transportation Needs: Not on file   Physical Activity: Not on file   Stress: Not on file   Social Connections: Not on file   Intimate Partner Violence: Not on file   Housing Stability: Not on file       Objective     Vitals:    09/07/23 1424   BP: 130/80   Pulse: 88   Resp: 16   Temp: 97.7 °F (36.5 °C)   SpO2: 95%     Wt Readings from Last 3 Encounters:   09/07/23 (!) 173 kg (381 lb 9.6 oz)   04/01/23 (!) 179 kg (394 lb)   02/15/23 (!) 174 kg (384 lb)       Physical Exam  Vitals and nursing note reviewed. Constitutional:       Appearance: Normal appearance. HENT:      Head: Normocephalic and atraumatic. Eyes:      Conjunctiva/sclera: Conjunctivae normal.   Cardiovascular:      Rate and Rhythm: Normal rate and regular rhythm. Heart sounds: Normal heart sounds. Pulmonary:      Effort: Pulmonary effort is normal.      Breath sounds: Normal breath sounds. Abdominal:      General: Bowel sounds are normal.   Musculoskeletal:         General: Normal range of motion. Cervical back: Normal range of motion and neck supple. Skin:     General: Skin is warm and dry. Capillary Refill: Capillary refill takes less than 2 seconds. Findings: Rash present. Rash is macular. Neurological:      Mental Status: He is alert and oriented to person, place, and time. Psychiatric:         Mood and Affect: Mood normal.         Behavior: Behavior normal.         Thought Content:  Thought content normal.         Judgment: Judgment normal.         Pertinent Laboratory/Diagnostic Studies:  Lab Results   Component Value Date    GLUCOSE 87 07/01/2015    BUN 11 09/07/2023    CREATININE 0.73 09/07/2023    CALCIUM 9.6 09/07/2023     07/01/2015    K 4.6 09/07/2023    CO2 32 09/07/2023     09/07/2023     Lab Results   Component Value Date    ALT 39 09/07/2023    AST 26 09/07/2023    ALKPHOS 59 09/07/2023    BILITOT 0.2 07/01/2015       Lab Results   Component Value Date    WBC 8.70 04/04/2023    HGB 16.1 04/04/2023    HCT 50.4 (H) 04/04/2023    MCV 91 04/04/2023     04/04/2023       No results found for: "TSH"    No results found for: "CHOL"  Lab Results   Component Value Date    TRIG 130 01/21/2021     Lab Results   Component Value Date    HDL 41 01/21/2021     Lab Results   Component Value Date    LDLCALC 105 (H) 01/21/2021     Lab Results   Component Value Date    HGBA1C 5.7 (H) 09/07/2023       Results for orders placed or performed in visit on 09/07/23   Comprehensive metabolic panel   Result Value Ref Range    Sodium 138 135 - 147 mmol/L    Potassium 4.6 3.5 - 5.3 mmol/L    Chloride 102 96 - 108 mmol/L    CO2 32 21 - 32 mmol/L    ANION GAP 4 mmol/L    BUN 11 5 - 25 mg/dL    Creatinine 0.73 0.60 - 1.30 mg/dL    Glucose, Fasting 89 65 - 99 mg/dL    Calcium 9.6 8.4 - 10.2 mg/dL    AST 26 13 - 39 U/L    ALT 39 7 - 52 U/L    Alkaline Phosphatase 59 34 - 104 U/L    Total Protein 7.4 6.4 - 8.4 g/dL    Albumin 4.4 3.5 - 5.0 g/dL    Total Bilirubin 0.71 0.20 - 1.00 mg/dL    eGFR 126 ml/min/1.73sq m   HEMOGLOBIN A1C W/ EAG ESTIMATION   Result Value Ref Range    Hemoglobin A1C 5.7 (H) Normal 4.0-5.6%; PreDiabetic 5.7-6.4%; Diabetic >=6.5%; Glycemic control for adults with diabetes <7.0% %     mg/dl       No orders of the defined types were placed in this encounter. ALLERGIES:  No Known Allergies    Current Medications     Current Outpatient Medications   Medication Sig Dispense Refill   • albuterol (Ventolin HFA) 90 mcg/act inhaler Inhale 2 puffs every 4 (four) hours as needed for wheezing or shortness of breath 6.7 g 1   • clotrimazole-betamethasone (LOTRISONE) 1-0.05 % cream Apply topically 2 (two) times a day Apply to bilateral foot rash twice daily for the next 2 weeks or until resolved. 45 g 1   • ketoconazole (NIZORAL) 2 % cream Apply topically daily 60 g 3     No current facility-administered medications for this visit.          Health Maintenance     Health Maintenance   Topic Date Due   • COVID-19 Vaccine (3 - Moderna series) 08/18/2021   • Annual Physical  11/10/2021   • Influenza Vaccine (1) 09/01/2023   • HIV Screening  09/07/2025 (Originally 2/24/2010)   • Hepatitis C Screening  10/07/2025 (Originally 1995)   • Depression Remission PHQ  12/07/2023   • BMI: Followup Plan  06/07/2024   • BMI: Adult  09/07/2024   • DTaP,Tdap,and Td Vaccines (2 - Td or Tdap) 01/03/2028   • HIB Vaccine  Aged Out   • IPV Vaccine  Aged Out   • Hepatitis A Vaccine  Aged Out   • Meningococcal ACWY Vaccine  Aged Out   • HPV Vaccine  Aged Out   • Pneumococcal Vaccine: Pediatrics (0 to 5 Years) and At-Risk Patients (6 to 59 Years)  Discontinued     Immunization History   Administered Date(s) Administered   • COVID-19 MODERNA VACC 0.5 ML IM 05/26/2021, 06/23/2021   • INFLUENZA 01/03/2018   • Influenza Quadrivalent Preservative Free 3 years and older IM 01/03/2018   • Influenza, injectable, quadrivalent, preservative free 0.5 mL 11/10/2020   • Tdap 01/03/2018          PRIMO Salas

## 2023-11-04 ENCOUNTER — HOSPITAL ENCOUNTER (EMERGENCY)
Facility: HOSPITAL | Age: 28
Discharge: HOME | End: 2023-11-04
Attending: EMERGENCY MEDICINE
Payer: COMMERCIAL

## 2023-11-04 VITALS
HEART RATE: 99 BPM | RESPIRATION RATE: 16 BRPM | DIASTOLIC BLOOD PRESSURE: 72 MMHG | SYSTOLIC BLOOD PRESSURE: 176 MMHG | TEMPERATURE: 97.9 F | OXYGEN SATURATION: 96 %

## 2023-11-04 DIAGNOSIS — R04.0 EPISTAXIS: Primary | ICD-10-CM

## 2023-11-04 DIAGNOSIS — R03.0 ELEVATED BLOOD PRESSURE READING: ICD-10-CM

## 2023-11-04 LAB
BASOPHILS # BLD AUTO: 0.03 THOUSANDS/ÂΜL (ref 0–0.1)
BASOPHILS NFR BLD AUTO: 0 % (ref 0–1)
EOSINOPHIL # BLD AUTO: 0.27 THOUSAND/ÂΜL (ref 0–0.61)
EOSINOPHIL NFR BLD AUTO: 3 % (ref 0–6)
ERYTHROCYTE [DISTWIDTH] IN BLOOD BY AUTOMATED COUNT: 12.5 % (ref 11.6–15.1)
HCT VFR BLD AUTO: 48 % (ref 36.5–49.3)
HGB BLD-MCNC: 15.6 G/DL (ref 12–17)
IMM GRANULOCYTES # BLD AUTO: 0.03 THOUSAND/UL (ref 0–0.2)
IMM GRANULOCYTES NFR BLD AUTO: 0 % (ref 0–2)
LYMPHOCYTES # BLD AUTO: 2.59 THOUSANDS/ÂΜL (ref 0.6–4.47)
LYMPHOCYTES NFR BLD AUTO: 31 % (ref 14–44)
MCH RBC QN AUTO: 29.1 PG (ref 26.8–34.3)
MCHC RBC AUTO-ENTMCNC: 32.5 G/DL (ref 31.4–37.4)
MCV RBC AUTO: 89 FL (ref 82–98)
MONOCYTES # BLD AUTO: 0.58 THOUSAND/ÂΜL (ref 0.17–1.22)
MONOCYTES NFR BLD AUTO: 7 % (ref 4–12)
NEUTROPHILS # BLD AUTO: 4.77 THOUSANDS/ÂΜL (ref 1.85–7.62)
NEUTS SEG NFR BLD AUTO: 59 % (ref 43–75)
NRBC BLD AUTO-RTO: 0 /100 WBCS
PLATELET # BLD AUTO: 219 THOUSANDS/UL (ref 149–390)
PMV BLD AUTO: 9.6 FL (ref 8.9–12.7)
RBC # BLD AUTO: 5.37 MILLION/UL (ref 3.88–5.62)
WBC # BLD AUTO: 8.27 THOUSAND/UL (ref 4.31–10.16)

## 2023-11-04 PROCEDURE — 99284 EMERGENCY DEPT VISIT MOD MDM: CPT | Performed by: EMERGENCY MEDICINE

## 2023-11-04 PROCEDURE — 30901 CONTROL OF NOSEBLEED: CPT | Performed by: EMERGENCY MEDICINE

## 2023-11-04 PROCEDURE — 36415 COLL VENOUS BLD VENIPUNCTURE: CPT

## 2023-11-04 PROCEDURE — 85025 COMPLETE CBC W/AUTO DIFF WBC: CPT

## 2023-11-04 PROCEDURE — 99283 EMERGENCY DEPT VISIT LOW MDM: CPT

## 2023-11-04 RX ADMIN — SODIUM CHLORIDE 250 ML: 0.9 INJECTION, SOLUTION INTRAVENOUS at 17:57

## 2023-11-04 RX ADMIN — SILVER NITRATE APPLICATORS 1 APPLICATOR: 25; 75 STICK TOPICAL at 18:09

## 2023-11-04 NOTE — DISCHARGE INSTRUCTIONS
You were evaluated in the emergency department for: nosebleed. You can access your current and pending results through [facility]. - You should follow-up with your primary care provider, as soon as possible, for re-evaluation. If you do not have a primary care provider, [facility] - I have also referred you to ENT, their number is included in this paperwork. Please, Return to the Emergency Department sooner if increased bleeding, pain, fever, vomiting, difficulty breathing, dizziness, weakness. Direct pressure if re-bleeding.

## 2023-11-04 NOTE — ED PROVIDER NOTES
History  Chief Complaint   Patient presents with    Nose Bleed     Nose bleed that started 10 minutes ago. Pt reports he randomly gets them for years. Pt not on thinner. Bleeding under control now. Sunshine Mao is a 29 y.o. male with PMHx pertinent for Anxiety, OTF, mild intermittent asthma presenting with nose bleed. Patient and wife report that he has had multiple, at least 4, episodes of recurrent epistaxis over the last few days, food and a prolonged episode on Wednesday lasting for 40 minutes. Was able to stop the bleeding with pressure. However patient and wife at bedside reports that the volume of blood is very large and concerning to them, and with recurrence of the epistaxis, he presented to the ED. Currently reports mild weakness, no chest pain, no palpitations, no shortness of breath, no nausea, no vomiting. Reports family has been experiencing upper respiratory infection, as well his wife currently has pneumonia which is being treated outpatient. He currently denies fevers, chills. Patient reports that he has been having upper respiratory symptoms, and blowing his nose often. He also reports that he has had multiple episodes of epistaxis in the past, and reports a family history of bleeding disorders though did not know what specific disorder it was, possibly related to platelets. The following portions of the patient's history were reviewed and updated as appropriate: allergies, current medications, past family history, past medical history, past social history, past surgical history, and problem list.    History collected from patient and wife, translation services not necessary. Prior to Admission Medications   Prescriptions Last Dose Informant Patient Reported? Taking?    albuterol (Ventolin HFA) 90 mcg/act inhaler   No No   Sig: Inhale 2 puffs every 4 (four) hours as needed for wheezing or shortness of breath   clotrimazole-betamethasone (LOTRISONE) 1-0.05 % cream   No No Sig: Apply topically 2 (two) times a day Apply to bilateral foot rash twice daily for the next 2 weeks or until resolved. ketoconazole (NIZORAL) 2 % cream   No No   Sig: Apply topically daily      Facility-Administered Medications: None       Past Medical History:   Diagnosis Date    Ankle fracture     right    Anxiety 2020    Apnea 2020    Class 3 severe obesity due to excess calories without serious comorbidity with body mass index (BMI) of 45.0 to 49.9 in adult ([facility]) 2020    Daytime somnolence 2020    Depression     Fall [id number]    "fell off of a roof"    Mild intermittent asthma     Nonintractable headache 2020    Poor short term memory 2020    Sleep apnea     Snoring 2020       Past Surgical History:   Procedure Laterality Date    TOOTH EXTRACTION         Family History   Problem Relation Age of Onset    Diabetes Mother     Breast cancer Mother     Diabetes Father     Hypertension Father     Diabetes Brother     Hypertension Brother     Hypertension Maternal Grandfather     Diabetes Maternal Grandfather     Thyroid disease Maternal Grandfather     Diabetes Paternal Grandmother     Alzheimer's disease Paternal Grandfather     Thyroid disease Maternal Uncle     Alzheimer's disease Paternal Aunt      I have reviewed and agree with the history as documented. E-Cigarette/Vaping    E-Cigarette Use Never User      E-Cigarette/Vaping Substances    Nicotine No     THC No     CBD No     Flavoring No     Other No     Unknown No      Social History     Tobacco Use    Smoking status: Former     Packs/day: 0.50     Types: Cigarettes     Quit date: 2018     Years since quittin.4     Passive exposure: Never    Smokeless tobacco: Former     Types: Chew   Vaping Use    Vaping Use: Never used   Substance Use Topics    Alcohol use:  Yes     Alcohol/week: 3.0 standard drinks of alcohol     Types: 3 Cans of beer per week     Comment: socially    Drug use: No        Review of Systems   Constitutional:  Negative for chills and fever. HENT:  Positive for congestion and nosebleeds. Eyes:  Negative for pain and visual disturbance. Respiratory:  Negative for cough, chest tightness, shortness of breath, wheezing and stridor. Cardiovascular:  Negative for chest pain, palpitations and leg swelling. Gastrointestinal:  Negative for abdominal pain, constipation, diarrhea, nausea and vomiting. Genitourinary:  Negative for dysuria and hematuria. Musculoskeletal:  Negative for arthralgias and back pain. Skin:  Negative for color change, pallor and rash. Neurological:  Negative for dizziness, syncope, light-headedness, numbness and headaches. Psychiatric/Behavioral:  Negative for behavioral problems. All other systems reviewed and are negative. Physical Exam  ED Triage Vitals [11/04/23 1602]   Temperature Pulse Respirations Blood Pressure SpO2   97.9 °F (36.6 °C) 99 16 (!) 176/72 96 %      Temp Source Heart Rate Source Patient Position - Orthostatic VS BP Location FiO2 (%)   Oral Monitor Sitting Right arm --      Pain Score       --             Orthostatic Vital Signs  Vitals:    11/04/23 1602   BP: (!) 176/72   Pulse: 99   Patient Position - Orthostatic VS: Sitting       Physical Exam  Vitals and nursing note reviewed. Constitutional:       Appearance: Normal appearance. He is well-developed. He is not ill-appearing or toxic-appearing. HENT:      Head: Normocephalic and atraumatic. Nose: No rhinorrhea. Right Nostril: No septal hematoma. Left Nostril: No septal hematoma. Right Turbinates: Not swollen or pale. Left Turbinates: Not swollen or pale. Mouth/Throat:      Mouth: Mucous membranes are moist.      Pharynx: Oropharynx is clear. Eyes:      Extraocular Movements: Extraocular movements intact. Conjunctiva/sclera: Conjunctivae normal.   Cardiovascular:      Rate and Rhythm: Normal rate and regular rhythm.       Pulses: Normal pulses. Heart sounds: Normal heart sounds. No murmur heard. Pulmonary:      Effort: Pulmonary effort is normal. No respiratory distress. Breath sounds: Normal breath sounds. Abdominal:      General: Abdomen is flat. Bowel sounds are normal.      Palpations: Abdomen is soft. Tenderness: There is no abdominal tenderness. Musculoskeletal:      Cervical back: Neck supple. Right lower leg: No edema. Left lower leg: No edema. Skin:     General: Skin is warm and dry. Capillary Refill: Capillary refill takes less than 2 seconds. Neurological:      General: No focal deficit present. Mental Status: He is alert and oriented to person, place, and time.    Psychiatric:         Mood and Affect: Mood normal.         Behavior: Behavior normal.         ED Medications  Medications   sodium chloride 0.9 % bolus 250 mL (0 mL Intravenous Stopped 11/4/23 1810)   silver nitrate-potassium nitrate (ARZOL SILVER NITRATE) 86-64 % applicator 1 applicator (1 applicator Topical Given by Other 11/4/23 1809)       Diagnostic Studies  Results Reviewed       Procedure Component Value Units Date/Time    CBC and differential [[id number]] Collected: 11/04/23 1752    Lab Status: Final result Specimen: Blood from Arm, Right Updated: 11/04/23 1801     WBC 8.27 Thousand/uL      RBC 5.37 Million/uL      Hemoglobin 15.6 g/dL      Hematocrit 48.0 %      MCV 89 fL      MCH 29.1 pg      MCHC 32.5 g/dL      RDW 12.5 %      MPV 9.6 fL      Platelets 226 Thousands/uL      nRBC 0 /100 WBCs      Neutrophils Relative 59 %      Immat GRANS % 0 %      Lymphocytes Relative 31 %      Monocytes Relative 7 %      Eosinophils Relative 3 %      Basophils Relative 0 %      Neutrophils Absolute 4.77 Thousands/µL      Immature Grans Absolute 0.03 Thousand/uL      Lymphocytes Absolute 2.59 Thousands/µL      Monocytes Absolute 0.58 Thousand/µL      Eosinophils Absolute 0.27 Thousand/µL      Basophils Absolute 0.03 Thousands/µL No orders to display         Procedures  Epistaxis management    Date/Time: 11/4/2023 6:14 PM    Performed by: Unruly Butler MD  Authorized by: Unruly Butler MD  Universal Protocol:  Procedure performed by: (Attending Dr. Floresita Lazar)  Consent: Verbal consent obtained. Risks and benefits: risks, benefits and alternatives were discussed  Consent given by: patient  Patient understanding: patient states understanding of the procedure being performed  Required items: required blood products, implants, devices, and special equipment available  Patient identity confirmed: verbally with patient    Patient location:  ED  Anesthesia (see MAR for exact dosages): Anesthesia method:  None  Procedure details:     Treatment site:  L septum    Hemostasis method:  Silver nitrate    Approach:  External    Spec Headlamp used: No (Otoscope with direct visualization of internal asepct of nares and vessel\)      Treatment complexity:  Limited    Treatment episode: initial    Post-procedure details:     Assessment:  Bleeding stopped    Patient tolerance of procedure: Tolerated well, no immediate complications        ED Course  ED Course as of 11/06/23 2044   Sat Nov 04, 2023   1652 Blood Pressure(!): 176/72  176/72, HR 99, RR 16, 97.9 F, 96% RA   1723 Patient seen and evaluated at bedside. Patient and wife report that he has had multiple, at least 4, episodes of recurrent epistaxis over the last few days, food and a prolonged episode on Wednesday lasting for 40 minutes. Was able to stop the bleeding with pressure. However patient and wife at bedside reports that the volume of blood is very large and concerning to them, and with recurrence of the epistaxis, he presented to the ED. Currently reports mild weakness, no chest pain, no palpitation, no nausea, no vomiting. Reports family has been experiencing upper respiratory infection, as well his wife currently has pneumonia which is being treated outpatient. Patient reports that he has been having upper respiratory symptoms, and blowing his nose often. He also reports that he has had epistaxis in the past, and reports a family history of bleeding disorders though did not know what specific disorder it was, possibly related to platelets. Physical exam showed well-appearing adult male, not in acute distress. Heart regular rate and rhythm, Abdomen non-tender, non-distended. Pulses intact in all four extremities, extremities appear well prefused. Examination of nares shows clotted blood, no active bleeding. Right nares no visible vessels. Left nares a small number of vessels on the nasal septum, not actively bleeding. No blood in posterior nose. Will get CBC, give 250 cc IVF, and cauterize with silver nitrate. We will continue to monitor while in the ED   1801 CBC and differential  Wnl, no significant anemia   1810 Silver nitrate epistaxis management performed. Patient tolerated well. [phone] Patient reevaluated at bedside, discussed all results including CBC. Patient did not have a recurrence of bleeding while in the ED after observation. All questions answered. He is stable for discharge. Strict return precautions discussed. Discussed importance of following up with PCP in the next few days. Given outpatient ENT referral.                                       Medical Decision Making  Initial ED assessment:  29 y.o. male with PMHx pertinent for Anxiety, OTF, mild intermittent asthma presenting with nose bleed, not actively bleeding in the ED. Has had multiple, at least 4, episodes over the last few days. Was able to stop the bleeding with pressure. Reports mild weakness, no chest pain, no palpitations, no shortness of breath, no nausea, no vomiting. Reports family has been experiencing upper respiratory infection. He currently denies fevers, chills.     Afebrile, hypertensive on presentation, vitals otherwise normal.  Physical exam showed well-appearing adult male, not in acute distress. Heart regular rate and rhythm, Abdomen non-tender, non-distended. Pulses intact in all four extremities, extremities appear well prefused. Examination of nares shows clotted blood, no active bleeding. Right nares no visible vessels. Left nares a small number of vessels on the nasal septum, not actively bleeding. No blood in posterior nose. Initial DDx includes but is not limited to:  Epistaxis, likely anterior without blood visualized in posterior nose, however considered posterior source. Also considered traumatic cause, but patient denied trauma. Considered substance related but patient denied. Recent illness with repeated nose-blowing likely related. Will evaluated for anemia. Will get CBC, give 250 cc IVF, and cauterize with silver nitrate. We will continue to monitor while in the ED    Updates:    See ED Course    Final ED summary/disposition: After evaluation and workup in the emergency department Patient appears well, is nontoxic appearing, expresses understanding and agrees with plan of care at this time. In light of this patient would benefit from outpatient management. All results were discussed with patient. Discussed strict return precautions especially related to nosebleeds and bleeding. Discussed importance of follow-up with PCP. Discharged with ENT referral. All questions answered. Patient stable for discharge. Amount and/or Complexity of Data Reviewed  Labs: ordered. Decision-making details documented in ED Course. Risk  Prescription drug management.           Disposition  Final diagnoses:   Epistaxis   Elevated blood pressure reading     Time reflects when diagnosis was documented in both MDM as applicable and the Disposition within this note       Time User Action Codes Description Comment    11/4/2023  6:13 PM Mikie Arredondo [R04.0] Epistaxis     11/4/2023  6:14 PM Baxter Koyanagi [R03.0] Elevated blood pressure reading           ED Disposition       ED Disposition   Discharge    Condition   Stable    Date/Time   Sat Nov 4, 2023  6:12 PM    Comment   Merry Boyle discharge to home/self care. Follow-up Information       Follow up With Specialties Details Why Contact Info Additional Information    Lore Oconnor, [address] [facility], Internal Medicine, Nurse Practitioner Schedule an appointment as soon as possible for a visit in 2 days Re-evaluation of symptoms [address] [address]  [facility] [address]  [phone] [address] ENT Otolaryngology Schedule an appointment as soon as possible for a visit  Re-evaluation of symptoms [facility] [id number]  [address] [address] ENT, [address] [place] [address], [phone]   [address]  Emergency Department Emergency Medicine Go to  As needed, If symptoms worsen [address] Emergency Department, [place], [place], [address]            Discharge Medication List as of 11/4/2023  6:18 PM        CONTINUE these medications which have NOT CHANGED    Details   albuterol (Ventolin HFA) 90 mcg/act inhaler Inhale 2 puffs every 4 (four) hours as needed for wheezing or shortness of breath, Starting Wed 6/7/2023, Normal      clotrimazole-betamethasone (LOTRISONE) 1-0.05 % cream Apply topically 2 (two) times a day Apply to bilateral foot rash twice daily for the next 2 weeks or until resolved., Starting Mon 8/28/2023, Normal      ketoconazole (NIZORAL) 2 % cream Apply topically daily, Starting Thu 9/7/2023, Normal               PDMP Review         Value Time User    PDMP Reviewed  Yes 4/1/2023  9:23 PM Mariann Vega, [facility]             ED Provider  Attending physically available and evaluated Merry Boyle.  I managed the patient along with the ED Attending.     Electronically Signed by           Amy Ahuja MD  11/06/23 3176

## 2023-11-04 NOTE — ED ATTENDING ATTESTATION
11/4/2023  I, Dorys Valenzuela MD, saw and evaluated the patient. I have discussed the patient with the resident/non-physician practitioner and agree with the resident's/non-physician practitioner's findings, Plan of Care, and MDM as documented in the resident's/non-physician practitioner's note, except where noted. All available labs and Radiology studies were reviewed. I was present for key portions of any procedure(s) performed by the resident/non-physician practitioner and I was immediately available to provide assistance. At this point I agree with the current assessment done in the Emergency Department. I have conducted an independent evaluation of this patient a history and physical is as follows:    80-year-old male presented for evaluation after multiple episodes of epistaxis over the last few days. He has had frequent epistaxis in the past but notes that it is longer lasting for the last few days. He currently has some URI symptoms. Frequent cough. Notes he had bleeding from both nostrils on arrival to the emergency department but at the time of our evaluation bleeding has stopped. Some dried blood at the edges of the nostrils. The left anterior septum appears to be the source. Plan silver nitrate cautery. He did also report feeling a little lightheaded and states he felt like he lost large amount of blood at home. We will check CBC. ED Course  ED Course as of 11/04/23 1812   Sat Nov 04, 2023 1811 Left anterior septum cauterized with silver nitrate. No recurrence of bleeding. CBC within normal limits. Stable for discharge home.   Outpatient ENT referral.         Critical Care Time  Procedures

## 2023-11-21 ENCOUNTER — TELEPHONE (OUTPATIENT)
Dept: FAMILY MEDICINE CLINIC | Facility: CLINIC | Age: 28
End: 2023-11-21

## 2023-11-21 NOTE — TELEPHONE ENCOUNTER
Patients SO called because he has been out of work yesterday and today for migraines and will return to tomorrow wanted to know if he can have a work note.  Please advise

## 2023-12-13 DIAGNOSIS — J06.9 ACUTE UPPER RESPIRATORY INFECTION: Primary | ICD-10-CM

## 2023-12-13 RX ORDER — AMOXICILLIN 875 MG/1
875 TABLET, COATED ORAL 2 TIMES DAILY
Qty: 20 TABLET | Refills: 0 | Status: SHIPPED | OUTPATIENT
Start: 2023-12-13 | End: 2023-12-23

## 2023-12-26 DIAGNOSIS — R05.1 ACUTE COUGH: Primary | ICD-10-CM

## 2023-12-26 RX ORDER — BENZONATATE 200 MG/1
200 CAPSULE ORAL 3 TIMES DAILY PRN
Qty: 30 CAPSULE | Refills: 0 | Status: SHIPPED | OUTPATIENT
Start: 2023-12-26

## 2023-12-26 RX ORDER — METHYLPREDNISOLONE 4 MG/1
TABLET ORAL
Qty: 21 EACH | Refills: 0 | Status: SHIPPED | OUTPATIENT
Start: 2023-12-26

## 2024-01-30 ENCOUNTER — NURSE TRIAGE (OUTPATIENT)
Dept: OTHER | Facility: OTHER | Age: 29
End: 2024-01-30

## 2024-01-30 ENCOUNTER — OFFICE VISIT (OUTPATIENT)
Dept: FAMILY MEDICINE CLINIC | Facility: CLINIC | Age: 29
End: 2024-01-30
Payer: COMMERCIAL

## 2024-01-30 VITALS
SYSTOLIC BLOOD PRESSURE: 126 MMHG | HEIGHT: 71 IN | TEMPERATURE: 98 F | DIASTOLIC BLOOD PRESSURE: 80 MMHG | BODY MASS INDEX: 44.1 KG/M2 | RESPIRATION RATE: 17 BRPM | HEART RATE: 98 BPM | OXYGEN SATURATION: 96 % | WEIGHT: 315 LBS

## 2024-01-30 DIAGNOSIS — G43.909 MIGRAINE WITHOUT STATUS MIGRAINOSUS, NOT INTRACTABLE, UNSPECIFIED MIGRAINE TYPE: Primary | ICD-10-CM

## 2024-01-30 DIAGNOSIS — E66.01 MORBID OBESITY (HCC): ICD-10-CM

## 2024-01-30 DIAGNOSIS — L72.3 SEBACEOUS CYST: ICD-10-CM

## 2024-01-30 DIAGNOSIS — L70.0 ACNE VULGARIS: ICD-10-CM

## 2024-01-30 PROBLEM — F34.1 DYSTHYMIA: Status: RESOLVED | Noted: 2021-12-01 | Resolved: 2024-01-30

## 2024-01-30 PROCEDURE — 99214 OFFICE O/P EST MOD 30 MIN: CPT | Performed by: NURSE PRACTITIONER

## 2024-01-30 PROCEDURE — 96372 THER/PROPH/DIAG INJ SC/IM: CPT

## 2024-01-30 RX ORDER — PHENTERMINE HYDROCHLORIDE 37.5 MG/1
37.5 TABLET ORAL DAILY
Qty: 30 TABLET | Refills: 1 | Status: SHIPPED | OUTPATIENT
Start: 2024-01-30

## 2024-01-30 RX ORDER — KETOROLAC TROMETHAMINE 30 MG/ML
60 INJECTION, SOLUTION INTRAMUSCULAR; INTRAVENOUS ONCE
Status: DISCONTINUED | OUTPATIENT
Start: 2024-01-30 | End: 2024-01-30

## 2024-01-30 RX ORDER — KETOROLAC TROMETHAMINE 30 MG/ML
30 INJECTION, SOLUTION INTRAMUSCULAR; INTRAVENOUS ONCE
Status: COMPLETED | OUTPATIENT
Start: 2024-01-30 | End: 2024-01-30

## 2024-01-30 RX ADMIN — KETOROLAC TROMETHAMINE 30 MG: 30 INJECTION, SOLUTION INTRAMUSCULAR; INTRAVENOUS at 12:56

## 2024-01-30 NOTE — PROGRESS NOTES
FAMILY PRACTICE OFFICE VISIT       NAME: Burak Croft  AGE: 28 y.o. SEX: male       : 1995        MRN: [id number]    DATE: 2024  TIME: 1:01 PM    Assessment and Plan   1. Migraine without status migrainosus, not intractable, unspecified migraine type  -     ketorolac (TORADOL) injection 30 mg    2. Morbid obesity (HCC)  -     phentermine (ADIPEX-P) 37.5 MG tablet; Take 1 tablet (37.5 mg total) by mouth in the morning    3. Sebaceous cyst  -     Ambulatory Referral to General Surgery; Future    4. Acne vulgaris  -     Ambulatory Referral to Dermatology; Future         There are no Patient Instructions on file for this visit.        Chief Complaint     Chief Complaint   Patient presents with    Migraine     Pt been seeing for migrainde /headaches with dizziness since yesterday         History of Present Illness   Burak Croft is a 28 y.o.-year-old male who is here for c/o migraines  Hx of migraines  Returning now  Yesterday before work  Today and yesterday  Right side of head  Worse with loud noise  Upset stomach  Ha not resolving  Got dizzy and lightheaded and slurring his words at target, tripping over his feet at target  Nauseas  Bp elevated yesterday with  ha  159/90  In tartet pupils were restricted  Did have 2 alcoholic drinks with dinner last night  Bp this am 118/80 this am  Needs referral to surgery for cyst in back of head      Review of Systems   Review of Systems   Constitutional:  Positive for fatigue. Negative for activity change, appetite change, chills and unexpected weight change.   HENT:  Negative for congestion, postnasal drip and rhinorrhea.    Eyes:  Negative for photophobia and visual disturbance.   Respiratory:  Negative for cough, shortness of breath and wheezing.    Cardiovascular:  Negative for chest pain and palpitations.   Gastrointestinal:  Negative for constipation, diarrhea, nausea and vomiting.   Genitourinary:  Negative for dysuria and frequency.   Musculoskeletal:   "Negative for arthralgias and myalgias.   Skin:  Positive for rash.   Neurological:  Positive for dizziness, light-headedness and headaches.   Hematological:  Negative for adenopathy.   Psychiatric/Behavioral:  Negative for dysphoric mood and sleep disturbance. The patient is not nervous/anxious.        Active Problem List     Patient Active Problem List   Diagnosis    Mild intermittent asthma without complication    IFG (impaired fasting glucose)    Morbid obesity (HCC)    Sleep apnea    Primary narcolepsy without cataplexy    Tinea pedis of both feet    Migraine without status migrainosus, not intractable    Sebaceous cyst    Acne vulgaris         Past Medical History:  Past Medical History:   Diagnosis Date    Ankle fracture     right    Anxiety 09/27/2020    Apnea 04/28/2020    Class 3 severe obesity due to excess calories without serious comorbidity with body mass index (BMI) of 45.0 to 49.9 in adult ([facility]) 04/28/2020    Daytime somnolence 04/28/2020    Depression     Fall 0459-3080    \"fell off of a roof\"    Mild intermittent asthma     Nonintractable headache 04/28/2020    Poor short term memory 04/28/2020    Sleep apnea     Snoring 04/28/2020       Past Surgical History:  Past Surgical History:   Procedure Laterality Date    TOOTH EXTRACTION         Family History:  Family History   Problem Relation Age of Onset    Diabetes Mother     Breast cancer Mother     Diabetes Father     Hypertension Father     Diabetes Brother     Hypertension Brother     Hypertension Maternal Grandfather     Diabetes Maternal Grandfather     Thyroid disease Maternal Grandfather     Diabetes Paternal Grandmother     Alzheimer's disease Paternal Grandfather     Thyroid disease Maternal Uncle     Alzheimer's disease Paternal Aunt        Social History:  Social History     Socioeconomic History    Marital status: Single     Spouse name: Not on file    Number of children: Not on file    Years of education: Not on file    Highest education " level: Not on file   Occupational History    Not on file   Tobacco Use    Smoking status: Former     Current packs/day: 0.00     Types: Cigarettes     Quit date: 2018     Years since quittin.6     Passive exposure: Never    Smokeless tobacco: Former     Types: Chew   Vaping Use    Vaping status: Never Used   Substance and Sexual Activity    Alcohol use: Yes     Alcohol/week: 3.0 standard drinks of alcohol     Types: 3 Cans of beer per week     Comment: socially    Drug use: No    Sexual activity: Yes     Partners: Female   Other Topics Concern    Not on file   Social History Narrative    Daily coffee consumption:1 cup a day        Most recent tobacco use screenin2018     Social Determinants of Health     Financial Resource Strain: Not on file   Food Insecurity: Not on file   Transportation Needs: Not on file   Physical Activity: Not on file   Stress: Not on file   Social Connections: Not on file   Intimate Partner Violence: Not on file   Housing Stability: Not on file       Objective     Vitals:    24 1209   BP: 126/80   Pulse: 98   Resp: 17   Temp: 98 °F (36.7 °C)   SpO2: 96%     Wt Readings from Last 3 Encounters:   24 (!) 177 kg (390 lb)   23 (!) 173 kg (381 lb 9.6 oz)   23 (!) 179 kg (394 lb)       Physical Exam  Vitals and nursing note reviewed.   Constitutional:       Appearance: Normal appearance.   HENT:      Head: Normocephalic and atraumatic.   Eyes:      Conjunctiva/sclera: Conjunctivae normal.   Cardiovascular:      Rate and Rhythm: Normal rate and regular rhythm.      Heart sounds: Normal heart sounds.   Pulmonary:      Effort: Pulmonary effort is normal.      Breath sounds: Normal breath sounds.   Abdominal:      General: Bowel sounds are normal.   Musculoskeletal:         General: Normal range of motion.      Cervical back: Normal range of motion and neck supple.   Skin:     General: Skin is warm and dry.      Findings: Rash present.             Comments:  "Sebacous cyst multiple along nape of neck  Open and closed comedomes thorax     Neurological:      General: No focal deficit present.      Mental Status: He is alert and oriented to person, place, and time.   Psychiatric:         Mood and Affect: Mood normal.         Behavior: Behavior normal.         Pertinent Laboratory/Diagnostic Studies:  Lab Results   Component Value Date    GLUCOSE 87 07/01/2015    BUN 11 09/07/2023    CREATININE 0.73 09/07/2023    CALCIUM 9.6 09/07/2023     07/01/2015    K 4.6 09/07/2023    CO2 32 09/07/2023     09/07/2023     Lab Results   Component Value Date    ALT 39 09/07/2023    AST 26 09/07/2023    ALKPHOS 59 09/07/2023    BILITOT 0.2 07/01/2015       Lab Results   Component Value Date    WBC 8.27 11/04/2023    HGB 15.6 11/04/2023    HCT 48.0 11/04/2023    MCV 89 11/04/2023     11/04/2023       No results found for: \"TSH\"    No results found for: \"CHOL\"  Lab Results   Component Value Date    TRIG 130 01/21/2021     Lab Results   Component Value Date    HDL 41 01/21/2021     Lab Results   Component Value Date    LDLCALC 105 (H) 01/21/2021     Lab Results   Component Value Date    HGBA1C 5.7 (H) 09/07/2023       Results for orders placed or performed during the hospital encounter of 11/04/23   CBC and differential   Result Value Ref Range    WBC 8.27 4.31 - 10.16 Thousand/uL    RBC 5.37 3.88 - 5.62 Million/uL    Hemoglobin 15.6 12.0 - 17.0 g/dL    Hematocrit 48.0 36.5 - 49.3 %    MCV 89 82 - 98 fL    MCH 29.1 26.8 - 34.3 pg    MCHC 32.5 31.4 - 37.4 g/dL    RDW 12.5 11.6 - 15.1 %    MPV 9.6 8.9 - 12.7 fL    Platelets 219 149 - 390 Thousands/uL    nRBC 0 /100 WBCs    Neutrophils Relative 59 43 - 75 %    Immat GRANS % 0 0 - 2 %    Lymphocytes Relative 31 14 - 44 %    Monocytes Relative 7 4 - 12 %    Eosinophils Relative 3 0 - 6 %    Basophils Relative 0 0 - 1 %    Neutrophils Absolute 4.77 1.85 - 7.62 Thousands/µL    Immature Grans Absolute 0.03 0.00 - 0.20 Thousand/uL    " Lymphocytes Absolute 2.59 0.60 - 4.47 Thousands/µL    Monocytes Absolute 0.58 0.17 - 1.22 Thousand/µL    Eosinophils Absolute 0.27 0.00 - 0.61 Thousand/µL    Basophils Absolute 0.03 0.00 - 0.10 Thousands/µL       Orders Placed This Encounter   Procedures    Ambulatory Referral to General Surgery    Ambulatory Referral to Dermatology       ALLERGIES:  No Known Allergies    Current Medications     Current Outpatient Medications   Medication Sig Dispense Refill    phentermine (ADIPEX-P) 37.5 MG tablet Take 1 tablet (37.5 mg total) by mouth in the morning 30 tablet 1     No current facility-administered medications for this visit.         Health Maintenance     Health Maintenance   Topic Date Due    Depression Follow-up Plan  Never done    Annual Physical  11/10/2021    COVID-19 Vaccine (3 - 2023-24 season) 04/30/2024 (Originally 9/1/2023)    Influenza Vaccine (1) 06/30/2024 (Originally 9/1/2023)    HIV Screening  09/07/2025 (Originally 2/24/2010)    Hepatitis C Screening  10/07/2025 (Originally 1995)    Depression Screening  01/30/2025    DTaP,Tdap,and Td Vaccines (2 - Td or Tdap) 01/03/2028    Zoster Vaccine (1 of 2) 02/24/2045    HIB Vaccine  Aged Out    IPV Vaccine  Aged Out    Hepatitis A Vaccine  Aged Out    Meningococcal ACWY Vaccine  Aged Out    HPV Vaccine  Aged Out    Pneumococcal Vaccine: Pediatrics (0 to 5 Years) and At-Risk Patients (6 to 64 Years)  Discontinued     Immunization History   Administered Date(s) Administered    COVID-19 MODERNA VACC 0.5 ML IM 05/26/2021, 06/23/2021    INFLUENZA 01/03/2018    Influenza Quadrivalent Preservative Free 3 years and older IM 01/03/2018    Influenza, injectable, quadrivalent, preservative free 0.5 mL 11/10/2020    Tdap 01/03/2018       Depression Screening and Follow-up Plan: Patient's depression screening was positive with a PHQ-9 score of 6. Patient assessed for underlying major depression. Brief counseling provided and recommend additional  follow-up/re-evaluation next office visit.         PRIMO Barroso  gen

## 2024-01-30 NOTE — LETTER
January 30, 2024     Patient: Burak Croft  YOB: 1995  Date of Visit: 1/30/2024      To Whom it May Concern:    Burak Croft is under my professional care. Burak was seen in my office on 1/30/2024. Burak may return to work on 1/31/2024 unable to work today 1/30/2024 due to illness.    If you have any questions or concerns, please don't hesitate to call.         Sincerely,          PRIMO Barroso        CC: No Recipients

## 2024-01-30 NOTE — TELEPHONE ENCOUNTER
Patient desires PCP appointment; scheduled with Maxine Schumacher at 1215. Patient advised to seek care sooner for chest pain, shortness of breath.

## 2024-01-30 NOTE — TELEPHONE ENCOUNTER
"Reason for Disposition   Headache is a chronic symptom (recurrent or ongoing AND present > 4 weeks)    Answer Assessment - Initial Assessment Questions  1. LOCATION: \"Where does it hurt?\"       Frontal    2. ONSET: \"When did the headache start?\" (Minutes, hours or days)       Yesterday am    3. PATTERN: \"Does the pain come and go, or has it been constant since it started?\"      Constant    4. SEVERITY: \"How bad is the pain?\" and \"What does it keep you from doing?\"  (e.g., Scale 1-10; mild, moderate, or severe)    - MILD (1-3): doesn't interfere with normal activities     - MODERATE (4-7): interferes with normal activities or awakens from sleep     - SEVERE (8-10): excruciating pain, unable to do any normal activities         5/10    5. RECURRENT SYMPTOM: \"Have you ever had headaches before?\" If Yes, ask: \"When was the last time?\" and \"What happened that time?\"       Yes    6. CAUSE: \"What do you think is causing the headache?\"      Migraine      7. MIGRAINE: \"Have you been diagnosed with migraine headaches?\" If Yes, ask: \"Is this headache similar?\"       Yes    8. HEAD INJURY: \"Has there been any recent injury to the head?\"       Denies    9. OTHER SYMPTOMS: \"Do you have any other symptoms?\" (fever, stiff neck, eye pain, sore throat, cold symptoms)      Heartburn, cysts in the back of his neck; dizzy and off balance last night.    Protocols used: Headache-ADULT-    "

## 2024-01-31 ENCOUNTER — TELEPHONE (OUTPATIENT)
Age: 29
End: 2024-01-31

## 2024-01-31 NOTE — TELEPHONE ENCOUNTER
"Patient calling for a letter from Maxine for his employer.  He was seen yesterday on 1/30 about a cyst on the back of his neck.  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  He is wondering if Maxine can write a letter stating that he \"can't look up while operating the forklift\".  Patient is scheduled with general surgery on 2/7.  "

## 2024-02-02 NOTE — TELEPHONE ENCOUNTER
If I write he can't look up will he not be able to work??  He can look up while he is waiting to go to general surgery  His employer wont accept that note I am sure  Maxine

## 2024-02-07 ENCOUNTER — CONSULT (OUTPATIENT)
Dept: SURGERY | Facility: CLINIC | Age: 29
End: 2024-02-07
Payer: COMMERCIAL

## 2024-02-07 ENCOUNTER — PREP FOR PROCEDURE (OUTPATIENT)
Dept: SURGERY | Facility: CLINIC | Age: 29
End: 2024-02-07

## 2024-02-07 VITALS — HEIGHT: 71 IN | BODY MASS INDEX: 44.1 KG/M2 | WEIGHT: 315 LBS

## 2024-02-07 DIAGNOSIS — L73.2 HIDRADENITIS: Primary | ICD-10-CM

## 2024-02-07 DIAGNOSIS — L73.2 HIDRADENITIS: ICD-10-CM

## 2024-02-07 PROCEDURE — 99204 OFFICE O/P NEW MOD 45 MIN: CPT | Performed by: SURGERY

## 2024-02-07 NOTE — PROGRESS NOTES
Assessment/Plan:    Diagnoses and all orders for this visit:    Hidradenitis  -     Ambulatory Referral to General Surgery    Discussed risks and benefits of excision of the posterior neck hidradenitis and potential for recurrence or wound healing issues and agrees to proceed.    Subjective:      Patient ID: Burak Croft is a 28 y.o. male.    Patient presents for evaluation of 2 cysts on the back of his neck.  States he has had the cysts for one year.  The cysts became red and sore 10 days ago.    He has had recurrent episodes of inflammation proximal his posterior neck on several occasions.  He is a heavy gentleman but is out working diligently to lose weight.  He is gone from 420 down to 370.  However recently had a fair amount of inflammation which led him to the emergency room.  Presently denies any active drainage or signs of inflammation.  However it is quite irritating the posterior neck region.    Has a history of asthma but has not used inhalers for several years.        The following portions of the patient's history were reviewed and updated as appropriate:     He  has a past medical history of Ankle fracture, Anxiety (09/27/2020), Apnea (04/28/2020), Class 3 severe obesity due to excess calories without serious comorbidity with body mass index (BMI) of 45.0 to 49.9 in adult (HCC) (04/28/2020), Daytime somnolence (04/28/2020), Depression, Fall (4734-1948), Mild intermittent asthma, Nonintractable headache (04/28/2020), Poor short term memory (04/28/2020), Sleep apnea, and Snoring (04/28/2020).  He  has a past surgical history that includes Tooth extraction.  His family history includes Alzheimer's disease in his paternal aunt and paternal grandfather; Breast cancer in his mother; Diabetes in his brother, father, maternal grandfather, mother, and paternal grandmother; Hypertension in his brother, father, and maternal grandfather; Thyroid disease in his maternal grandfather and maternal uncle.  He  reports  "that he quit smoking about 5 years ago. His smoking use included cigarettes. He has never been exposed to tobacco smoke. He has quit using smokeless tobacco.  His smokeless tobacco use included chew. He reports current alcohol use of about 3.0 standard drinks of alcohol per week. He reports that he does not use drugs.  Current Outpatient Medications   Medication Sig Dispense Refill    phentermine (ADIPEX-P) 37.5 MG tablet Take 1 tablet (37.5 mg total) by mouth in the morning 30 tablet 1     No current facility-administered medications for this visit.     He has No Known Allergies..    Review of Systems   Skin:  Positive for wound.         Objective:      Ht 5' 11\" (1.803 m)   Wt (!) 169 kg (373 lb)   BMI 52.02 kg/m²          Physical Exam  Constitutional:       General: He is not in acute distress.     Appearance: He is obese.   HENT:      Head: Atraumatic.      Mouth/Throat:      Mouth: Mucous membranes are moist.   Eyes:      Extraocular Movements: Extraocular movements intact.   Neck:      Comments: Areas of chronic opening mostly consistent with hidradenitis.  It involves a posterior neck skin crease approximately 12 cm in length..  No active infection.  Cardiovascular:      Rate and Rhythm: Normal rate.   Pulmonary:      Effort: Pulmonary effort is normal.   Abdominal:      Palpations: Abdomen is soft.   Musculoskeletal:      Cervical back: Normal range of motion.   Skin:     General: Skin is warm and dry.         "

## 2024-02-08 ENCOUNTER — TELEPHONE (OUTPATIENT)
Age: 29
End: 2024-02-08

## 2024-02-08 NOTE — TELEPHONE ENCOUNTER
Kinga called on behalf of Burak.  He is having surgery on 03/08/24 by Dr. Landry. He was asking for a note for his job indicating that he needs light duty that limits the mobility of neck. He was at work today and lifts a lot and pulled his neck and is sore.  He would lie the note to be put in my chart.  Please call him with any questions.If you can't reach him at that number, Kinga number is [phone]

## 2024-03-02 ENCOUNTER — APPOINTMENT (OUTPATIENT)
Dept: LAB | Age: 29
End: 2024-03-02
Payer: COMMERCIAL

## 2024-03-02 DIAGNOSIS — L73.2 HIDRADENITIS: ICD-10-CM

## 2024-03-02 LAB
ANION GAP SERPL CALCULATED.3IONS-SCNC: 8 MMOL/L
ATRIAL RATE: 68 BPM
BUN SERPL-MCNC: 10 MG/DL (ref 5–25)
CALCIUM SERPL-MCNC: 9.5 MG/DL (ref 8.4–10.2)
CHLORIDE SERPL-SCNC: 101 MMOL/L (ref 96–108)
CO2 SERPL-SCNC: 28 MMOL/L (ref 21–32)
CREAT SERPL-MCNC: 0.75 MG/DL (ref 0.6–1.3)
ERYTHROCYTE [DISTWIDTH] IN BLOOD BY AUTOMATED COUNT: 12.4 % (ref 11.6–15.1)
GFR SERPL CREATININE-BSD FRML MDRD: 123 ML/MIN/1.73SQ M
GLUCOSE P FAST SERPL-MCNC: 96 MG/DL (ref 65–99)
HCT VFR BLD AUTO: 53.1 % (ref 36.5–49.3)
HGB BLD-MCNC: 16.4 G/DL (ref 12–17)
MCH RBC QN AUTO: 28.7 PG (ref 26.8–34.3)
MCHC RBC AUTO-ENTMCNC: 30.9 G/DL (ref 31.4–37.4)
MCV RBC AUTO: 93 FL (ref 82–98)
P AXIS: 40 DEGREES
PLATELET # BLD AUTO: 242 THOUSANDS/UL (ref 149–390)
PMV BLD AUTO: 10.2 FL (ref 8.9–12.7)
POTASSIUM SERPL-SCNC: 4.9 MMOL/L (ref 3.5–5.3)
PR INTERVAL: 180 MS
QRS AXIS: 39 DEGREES
QRSD INTERVAL: 88 MS
QT INTERVAL: 386 MS
QTC INTERVAL: 410 MS
RBC # BLD AUTO: 5.72 MILLION/UL (ref 3.88–5.62)
SODIUM SERPL-SCNC: 137 MMOL/L (ref 135–147)
T WAVE AXIS: 34 DEGREES
VENTRICULAR RATE: 68 BPM
WBC # BLD AUTO: 5.49 THOUSAND/UL (ref 4.31–10.16)

## 2024-03-02 PROCEDURE — 36415 COLL VENOUS BLD VENIPUNCTURE: CPT

## 2024-03-02 PROCEDURE — 85027 COMPLETE CBC AUTOMATED: CPT

## 2024-03-02 PROCEDURE — 80048 BASIC METABOLIC PNL TOTAL CA: CPT

## 2024-03-04 ENCOUNTER — TELEPHONE (OUTPATIENT)
Age: 29
End: 2024-03-04

## 2024-03-24 DIAGNOSIS — E66.01 MORBID OBESITY (HCC): ICD-10-CM

## 2024-03-25 RX ORDER — PHENTERMINE HYDROCHLORIDE 37.5 MG/1
37.5 TABLET ORAL EVERY MORNING
Qty: 30 TABLET | Refills: 1 | Status: SHIPPED | OUTPATIENT
Start: 2024-03-25

## 2024-03-25 NOTE — TELEPHONE ENCOUNTER
Medication:  [facility] [id number] 02/27/2024 01/30/2024 Phentermine Hcl (Tablet) 30.0 30 37.5 MG  Active agreement on file -No

## 2024-04-28 DIAGNOSIS — E66.01 MORBID OBESITY (HCC): Primary | ICD-10-CM

## 2024-04-29 ENCOUNTER — APPOINTMENT (EMERGENCY)
Dept: RADIOLOGY | Facility: HOSPITAL | Age: 29
End: 2024-04-29
Payer: COMMERCIAL

## 2024-04-29 ENCOUNTER — HOSPITAL ENCOUNTER (EMERGENCY)
Facility: HOSPITAL | Age: 29
Discharge: HOME | End: 2024-04-29
Attending: EMERGENCY MEDICINE
Payer: COMMERCIAL

## 2024-04-29 VITALS
OXYGEN SATURATION: 95 % | RESPIRATION RATE: 18 BRPM | HEART RATE: 78 BPM | TEMPERATURE: 98.1 F | SYSTOLIC BLOOD PRESSURE: 145 MMHG | DIASTOLIC BLOOD PRESSURE: 73 MMHG

## 2024-04-29 DIAGNOSIS — M54.50 ACUTE BILATERAL LOW BACK PAIN WITHOUT SCIATICA: Primary | ICD-10-CM

## 2024-04-29 LAB
ALBUMIN SERPL BCP-MCNC: 4.3 G/DL (ref 3.5–5)
ALP SERPL-CCNC: 60 U/L (ref 34–104)
ALT SERPL W P-5'-P-CCNC: 22 U/L (ref 7–52)
ANION GAP SERPL CALCULATED.3IONS-SCNC: 4 MMOL/L (ref 4–13)
AST SERPL W P-5'-P-CCNC: 16 U/L (ref 13–39)
BASOPHILS # BLD AUTO: 0.02 THOUSANDS/ÂΜL (ref 0–0.1)
BASOPHILS NFR BLD AUTO: 0 % (ref 0–1)
BILIRUB SERPL-MCNC: 0.6 MG/DL (ref 0.2–1)
BUN SERPL-MCNC: 8 MG/DL (ref 5–25)
CALCIUM SERPL-MCNC: 9.7 MG/DL (ref 8.4–10.2)
CHLORIDE SERPL-SCNC: 103 MMOL/L (ref 96–108)
CK SERPL-CCNC: 157 U/L (ref 39–308)
CO2 SERPL-SCNC: 30 MMOL/L (ref 21–32)
CREAT SERPL-MCNC: 0.75 MG/DL (ref 0.6–1.3)
EOSINOPHIL # BLD AUTO: 0.18 THOUSAND/ÂΜL (ref 0–0.61)
EOSINOPHIL NFR BLD AUTO: 3 % (ref 0–6)
ERYTHROCYTE [DISTWIDTH] IN BLOOD BY AUTOMATED COUNT: 12.6 % (ref 11.6–15.1)
GFR SERPL CREATININE-BSD FRML MDRD: 123 ML/MIN/1.73SQ M
GLUCOSE SERPL-MCNC: 98 MG/DL (ref 65–140)
HCT VFR BLD AUTO: 52.3 % (ref 36.5–49.3)
HGB BLD-MCNC: 17.1 G/DL (ref 12–17)
IMM GRANULOCYTES # BLD AUTO: 0.02 THOUSAND/UL (ref 0–0.2)
IMM GRANULOCYTES NFR BLD AUTO: 0 % (ref 0–2)
LYMPHOCYTES # BLD AUTO: 2.1 THOUSANDS/ÂΜL (ref 0.6–4.47)
LYMPHOCYTES NFR BLD AUTO: 30 % (ref 14–44)
MCH RBC QN AUTO: 29.3 PG (ref 26.8–34.3)
MCHC RBC AUTO-ENTMCNC: 32.7 G/DL (ref 31.4–37.4)
MCV RBC AUTO: 90 FL (ref 82–98)
MONOCYTES # BLD AUTO: 0.42 THOUSAND/ÂΜL (ref 0.17–1.22)
MONOCYTES NFR BLD AUTO: 6 % (ref 4–12)
NEUTROPHILS # BLD AUTO: 4.27 THOUSANDS/ÂΜL (ref 1.85–7.62)
NEUTS SEG NFR BLD AUTO: 61 % (ref 43–75)
NRBC BLD AUTO-RTO: 0 /100 WBCS
PLATELET # BLD AUTO: 238 THOUSANDS/UL (ref 149–390)
PMV BLD AUTO: 10 FL (ref 8.9–12.7)
POTASSIUM SERPL-SCNC: 4.6 MMOL/L (ref 3.5–5.3)
PROT SERPL-MCNC: 7.3 G/DL (ref 6.4–8.4)
RBC # BLD AUTO: 5.83 MILLION/UL (ref 3.88–5.62)
SODIUM SERPL-SCNC: 137 MMOL/L (ref 135–147)
WBC # BLD AUTO: 7.01 THOUSAND/UL (ref 4.31–10.16)

## 2024-04-29 PROCEDURE — 36415 COLL VENOUS BLD VENIPUNCTURE: CPT | Performed by: EMERGENCY MEDICINE

## 2024-04-29 PROCEDURE — 99285 EMERGENCY DEPT VISIT HI MDM: CPT | Performed by: EMERGENCY MEDICINE

## 2024-04-29 PROCEDURE — 96374 THER/PROPH/DIAG INJ IV PUSH: CPT

## 2024-04-29 PROCEDURE — 99283 EMERGENCY DEPT VISIT LOW MDM: CPT

## 2024-04-29 PROCEDURE — 85025 COMPLETE CBC W/AUTO DIFF WBC: CPT | Performed by: EMERGENCY MEDICINE

## 2024-04-29 PROCEDURE — 80053 COMPREHEN METABOLIC PANEL: CPT | Performed by: EMERGENCY MEDICINE

## 2024-04-29 PROCEDURE — 96361 HYDRATE IV INFUSION ADD-ON: CPT

## 2024-04-29 PROCEDURE — 82550 ASSAY OF CK (CPK): CPT | Performed by: EMERGENCY MEDICINE

## 2024-04-29 PROCEDURE — 72100 X-RAY EXAM L-S SPINE 2/3 VWS: CPT

## 2024-04-29 RX ORDER — KETOROLAC TROMETHAMINE 30 MG/ML
15 INJECTION, SOLUTION INTRAMUSCULAR; INTRAVENOUS ONCE
Status: COMPLETED | OUTPATIENT
Start: 2024-04-29 | End: 2024-04-29

## 2024-04-29 RX ADMIN — KETOROLAC TROMETHAMINE 15 MG: 30 INJECTION, SOLUTION INTRAMUSCULAR; INTRAVENOUS at 10:48

## 2024-04-29 RX ADMIN — SODIUM CHLORIDE 1000 ML: 0.9 INJECTION, SOLUTION INTRAVENOUS at 09:38

## 2024-04-29 NOTE — ED PROVIDER NOTES
"History  Chief Complaint   Patient presents with    Back Pain     Pt states on Thursday \"I hurt my back landscaping.\" States pain radiates down L leg.         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  States he had a long history of back problems, 2 years ago he fell from a significant height and sustained a back injury says he has not had a normal back since does have intermittent pain.,  Has muscle relaxers at home which he has tried which did not help.  Otherwise he took ibuprofen which helped more than the muscle relaxers, no bowel or bladder incontinence no saddle anesthesia, no chest pain no shortness of breath no other areas of injury.,  Does have generalized sore achy muscles but more so of his back.  His girlfriend noticed that he has been having very dark urine over the past couple of days he feels that he is just not been drinking enough.  No history of rhabdomyolysis.,  Does take a weight loss drug Adipex      Back Pain  Associated symptoms: no abdominal pain, no chest pain, no dysuria, no fever, no headaches and no numbness        Prior to Admission Medications   Prescriptions Last Dose Informant Patient Reported? Taking?   Multiple Vitamin (Multivitamin Adult) TABS   Yes No   Sig: Take by mouth   Semaglutide-Weight Management (WEGOVY) 0.25 MG/0.5ML   No No   Sig: Inject 0.5 mL (0.25 mg total) under the skin once a week for 28 days   Semaglutide-Weight Management (WEGOVY) 0.5 MG/0.5ML   No No   Sig: Inject 0.5 mL (0.5 mg total) under the skin once a week for 28 days Do not start before May 24, 2024.   Semaglutide-Weight Management (WEGOVY) 1 MG/0.5ML   No No   Sig: Inject 0.5 mL (1 mg total) under the skin once a week for 28 days Do not start " "before 2024.   Semaglutide-Weight Management (WEGOVY) 1.7 MG/0.75ML   No No   Sig: Inject 0.75 mL (1.7 mg total) under the skin once a week for 28 days Do not start before 2024.   Semaglutide-Weight Management (WEGOVY) 2.4 MG/0.75ML   No No   Sig: Inject 0.75 mL (2.4 mg total) under the skin once a week for 28 days Do not start before 2024.   phentermine (ADIPEX-P) 37.5 MG tablet   No No   Sig: take 1 tablet by mouth every morning      Facility-Administered Medications: None       Past Medical History:   Diagnosis Date    Ankle fracture     right    Anxiety 2020    Apnea 2020    Class 3 severe obesity due to excess calories without serious comorbidity with body mass index (BMI) of 45.0 to 49.9 in adult (HCC) 2020    Daytime somnolence 2020    Depression     Fall [id number]    \"fell off of a roof\"    Mild intermittent asthma     Nonintractable headache 2020    Poor short term memory 2020    Sleep apnea     Snoring 2020       Past Surgical History:   Procedure Laterality Date    TOOTH EXTRACTION         Family History   Problem Relation Age of Onset    Diabetes Mother     Breast cancer Mother     Diabetes Father     Hypertension Father     Diabetes Brother     Hypertension Brother     Hypertension Maternal Grandfather     Diabetes Maternal Grandfather     Thyroid disease Maternal Grandfather     Diabetes Paternal Grandmother     Alzheimer's disease Paternal Grandfather     Thyroid disease Maternal Uncle     Alzheimer's disease Paternal Aunt      I have reviewed and agree with the history as documented.    E-Cigarette/Vaping    E-Cigarette Use Never User      E-Cigarette/Vaping Substances    Nicotine No     THC No     CBD No     Flavoring No     Other No     Unknown No      Social History     Tobacco Use    Smoking status: Former     Current packs/day: 0.00     Types: Cigarettes     Quit date: 2018     Years since quittin.9     Passive " exposure: Never    Smokeless tobacco: Former     Types: Chew   Vaping Use    Vaping status: Never Used   Substance Use Topics    Alcohol use: Yes     Alcohol/week: 3.0 standard drinks of alcohol     Types: 3 Cans of beer per week     Comment: socially    Drug use: No       Review of Systems   Constitutional:  Negative for activity change, chills, diaphoresis and fever.   HENT:  Negative for congestion, sinus pressure and sore throat.    Eyes:  Negative for pain and visual disturbance.   Respiratory:  Negative for cough, chest tightness, shortness of breath, wheezing and stridor.    Cardiovascular:  Negative for chest pain and palpitations.   Gastrointestinal:  Negative for abdominal distention, abdominal pain, constipation, diarrhea, nausea and vomiting.   Genitourinary:  Negative for dysuria and frequency.   Musculoskeletal:  Positive for back pain. Negative for neck pain and neck stiffness.   Skin:  Negative for rash.   Neurological:  Negative for dizziness, speech difficulty, light-headedness, numbness and headaches.       Physical Exam  Physical Exam  Vitals reviewed.   Constitutional:       General: He is not in acute distress.     Appearance: He is well-developed. He is not diaphoretic.   HENT:      Head: Normocephalic and atraumatic.      Right Ear: External ear normal.      Left Ear: External ear normal.      Nose: Nose normal.   Eyes:      General:         Right eye: No discharge.         Left eye: No discharge.      Pupils: Pupils are equal, round, and reactive to light.   Neck:      Trachea: No tracheal deviation.   Cardiovascular:      Rate and Rhythm: Normal rate and regular rhythm.      Heart sounds: Normal heart sounds. No murmur heard.  Pulmonary:      Effort: Pulmonary effort is normal. No respiratory distress.      Breath sounds: Normal breath sounds. No stridor.   Abdominal:      General: There is no distension.      Palpations: Abdomen is soft.      Tenderness: There is no abdominal tenderness.  There is no guarding or rebound.   Musculoskeletal:         General: Normal range of motion.      Cervical back: Normal range of motion and neck supple.      Comments: No spinous process tenderness, hypertonicity paraspinal musculature consistent with acute muscle spasm., +2 patella and Achilles reflex bilaterally. Normal sensation normal muscle strength bilateral lower extremities     Skin:     General: Skin is warm and dry.      Coloration: Skin is not pale.      Findings: No erythema.   Neurological:      General: No focal deficit present.      Mental Status: He is alert and oriented to person, place, and time.         Vital Signs  ED Triage Vitals [04/29/24 0844]   Temperature Pulse Respirations Blood Pressure SpO2   98.1 °F (36.7 °C) 78 18 145/73 95 %      Temp Source Heart Rate Source Patient Position - Orthostatic VS BP Location FiO2 (%)   Oral Monitor Sitting Left arm --      Pain Score       4           Vitals:    04/29/24 0844   BP: 145/73   Pulse: 78   Patient Position - Orthostatic VS: Sitting         Visual Acuity      ED Medications  Medications   sodium chloride 0.9 % bolus 1,000 mL (0 mL Intravenous Stopped 4/29/24 1052)   ketorolac (TORADOL) injection 15 mg (15 mg Intravenous Given 4/29/24 1048)       Diagnostic Studies  Results Reviewed       Procedure Component Value Units Date/Time    CBC and differential [[id number]]  (Abnormal) Collected: 04/29/24 0937    Lab Status: Final result Specimen: Blood from Arm, Left Updated: 04/29/24 1042     WBC 7.01 Thousand/uL      RBC 5.83 Million/uL      Hemoglobin 17.1 g/dL      Hematocrit 52.3 %      MCV 90 fL      MCH 29.3 pg      MCHC 32.7 g/dL      RDW 12.6 %      MPV 10.0 fL      Platelets 238 Thousands/uL      nRBC 0 /100 WBCs      Segmented % 61 %      Immature Grans % 0 %      Lymphocytes % 30 %      Monocytes % 6 %      Eosinophils Relative 3 %      Basophils Relative 0 %      Absolute Neutrophils 4.27 Thousands/µL      Absolute Immature Grans 0.02  Thousand/uL      Absolute Lymphocytes 2.10 Thousands/µL      Absolute Monocytes 0.42 Thousand/µL      Eosinophils Absolute 0.18 Thousand/µL      Basophils Absolute 0.02 Thousands/µL     Comprehensive metabolic panel [[id number]] Collected: 04/29/24 0937    Lab Status: Final result Specimen: Blood from Arm, Left Updated: 04/29/24 1010     Sodium 137 mmol/L      Potassium 4.6 mmol/L      Chloride 103 mmol/L      CO2 30 mmol/L      ANION GAP 4 mmol/L      BUN 8 mg/dL      Creatinine 0.75 mg/dL      Glucose 98 mg/dL      Calcium 9.7 mg/dL      AST 16 U/L      ALT 22 U/L      Alkaline Phosphatase 60 U/L      Total Protein 7.3 g/dL      Albumin 4.3 g/dL      Total Bilirubin 0.60 mg/dL      eGFR 123 ml/min/1.73sq m     Narrative:      National Kidney Disease Foundation guidelines for Chronic Kidney Disease (CKD):     Stage 1 with normal or high GFR (GFR > 90 mL/min/1.73 square meters)    Stage 2 Mild CKD (GFR = 60-89 mL/min/1.73 square meters)    Stage 3A Moderate CKD (GFR = 45-59 mL/min/1.73 square meters)    Stage 3B Moderate CKD (GFR = 30-44 mL/min/1.73 square meters)    Stage 4 Severe CKD (GFR = 15-29 mL/min/1.73 square meters)    Stage 5 End Stage CKD (GFR <15 mL/min/1.73 square meters)  Note: GFR calculation is accurate only with a steady state creatinine    CK [[id number]]  (Normal) Collected: 04/29/24 0937    Lab Status: Final result Specimen: Blood from Arm, Left Updated: 04/29/24 1010     Total  U/L                    XR lumbar spine 2 or 3 views   ED Interpretation by Kavon Rudolph DO (04/29 1046)         No acute osseous injury                 Procedures  Procedures         ED Course  ED Course as of 04/29/24 1056   Mon Apr 29, 2024   1019 No evidence of rhabdomyolysis                                             Medical Decision Making        Initial ED assessment: 29-year-old male, low back pain, dark urine, started after doing heavy lifting while working outdoors    Initial DDx includes but is not  limited to:   Lumbar strain lumbar fracture, dehydration, acute kidney injury, rhabdomyolysis, electrolyte abnormality, nephrolithiasis considered but this started suddenly with motion holding a heavy rock so I think this is less likely    Initial ED plan:   Blood work, x-ray, IV hydration, ultimate disposition pending ED workup         Final ED summary/disposition:   After evaluation and workup in the emergency department, strays negative for fracture, blood work without evidence of rhabdomyolysis, likely muscle strain of the back, will refer to comprehensive spine program.    Amount and/or Complexity of Data Reviewed  Labs: ordered.  Radiology: ordered and independent interpretation performed.    Risk  Prescription drug management.             Disposition  Final diagnoses:   Acute bilateral low back pain without sciatica     Time reflects when diagnosis was documented in both MDM as applicable and the Disposition within this note       Time User Action Codes Description Comment    4/29/2024 10:46 AM Kavon Rudolph Add [M54.50] Acute bilateral low back pain without sciatica           ED Disposition       ED Disposition   Discharge    Condition   Stable    Date/Time   Mon Apr 29, 2024 10:46 AM    Comment   Burak Croft discharge to home/self care.                   Follow-up Information    None         Discharge Medication List as of 4/29/2024 10:53 AM        CONTINUE these medications which have NOT CHANGED    Details   Multiple Vitamin (Multivitamin Adult) TABS Take by mouth, Historical Med      phentermine (ADIPEX-P) 37.5 MG tablet take 1 tablet by mouth every morning, Starting Mon 3/25/2024, Normal      Semaglutide-Weight Management (WEGOVY) 0.25 MG/0.5ML Inject 0.5 mL (0.25 mg total) under the skin once a week for 28 days, Starting Sun 4/28/2024, Until Sun 5/26/2024, Normal      Semaglutide-Weight Management (WEGOVY) 0.5 MG/0.5ML Inject 0.5 mL (0.5 mg total) under the skin once a week for 28 days Do not  start before May 24, 2024., Starting Fri 5/24/2024, Until Fri 6/21/2024, Normal      Semaglutide-Weight Management (WEGOVY) 1 MG/0.5ML Inject 0.5 mL (1 mg total) under the skin once a week for 28 days Do not start before June 21, 2024., Starting Fri 6/21/2024, Until Fri 7/19/2024, Normal      Semaglutide-Weight Management (WEGOVY) 1.7 MG/0.75ML Inject 0.75 mL (1.7 mg total) under the skin once a week for 28 days Do not start before July 19, 2024., Starting Fri 7/19/2024, Until Fri 8/16/2024, Normal      Semaglutide-Weight Management (WEGOVY) 2.4 MG/0.75ML Inject 0.75 mL (2.4 mg total) under the skin once a week for 28 days Do not start before August 16, 2024., Starting Fri 8/16/2024, Until Fri 9/13/2024, Normal                 PDMP Review         Value Time User    PDMP Reviewed  Yes 3/25/2024  6:08 PM PRIMO Barroso            ED Provider  Electronically Signed by             Kavon Rudolph, DO  04/29/24 1047       Kavon Rudolph, DO  04/29/24 1056

## 2024-04-29 NOTE — Clinical Note
Burak Croft was seen and treated in our emergency department on 4/29/2024.    No restrictions            Diagnosis:     Burak  may return to work on return date.    He may return on this date: 04/30/2024         If you have any questions or concerns, please don't hesitate to call.      Kavon Rudolph, DO    ______________________________           _______________          _______________  Hospital Representative                              Date                                Time

## 2024-05-01 ENCOUNTER — NURSE TRIAGE (OUTPATIENT)
Dept: PHYSICAL THERAPY | Facility: OTHER | Age: 29
End: 2024-05-01

## 2024-05-01 DIAGNOSIS — G89.29 ACUTE EXACERBATION OF CHRONIC LOW BACK PAIN: Primary | ICD-10-CM

## 2024-05-01 DIAGNOSIS — M54.50 ACUTE EXACERBATION OF CHRONIC LOW BACK PAIN: Primary | ICD-10-CM

## 2024-05-01 NOTE — TELEPHONE ENCOUNTER
"Additional Information   Negative: Is this related to a work injury?   Negative: Is this related to an MVA?   Negative: Are you currently recieving homecare services?   Negative: Has the patient had unexplained weight loss?   Negative: Does the patient have a fever?   Negative: Is the patient experiencing urine retention?   Negative: Is the patient experiencing acute drop foot or paralysis?   Negative: Has the patient experienced major trauma? (fall from height, high speed collision, direct blow to spine) and is also experiencing nausea, light-headedness, or loss of consciousness?   Negative: Is the patient experiencing blood in sputum?   Negative: Is this a chronic condition?    Background - Initial Assessment  Clinical complaint: bilateral lower back pain which radiates down the left leg with numbness and tingling. States this pain started 7 yrs ago and got worse 4/24/24. States he lifted something heavy and felt it in his lower back.  Date of onset: 4/24/24  Frequency of pain: constant  Quality of pain: sharp and shooting \"pulling pain\"    Protocols used: [facility] Protocol    Comprehensive Spine Program was reviewed in detail and what we can provide for their back pain.  Patient is agreeable to being triaged and would like to proceed with Physical Therapy.    Referral was placed for Physical Therapy at the [facility] site. Patients information was sent to the  to make evaluation appointment. Patient made aware that the PT office  will be calling to schedule the appointment.  Patient was provided with the phone number to the PT office.    No further questions and/or concerns were voiced by the patient at this time. Patient states understanding of the referral that was placed.    Referral Closed.    "

## 2024-05-17 ENCOUNTER — TELEMEDICINE (OUTPATIENT)
Dept: FAMILY MEDICINE CLINIC | Facility: CLINIC | Age: 29
End: 2024-05-17
Payer: COMMERCIAL

## 2024-05-17 VITALS — WEIGHT: 315 LBS | HEIGHT: 71 IN | BODY MASS INDEX: 44.1 KG/M2

## 2024-05-17 DIAGNOSIS — R41.840 DIFFICULTY CONCENTRATING: ICD-10-CM

## 2024-05-17 DIAGNOSIS — R41.3 MEMORY CHANGES: ICD-10-CM

## 2024-05-17 DIAGNOSIS — E66.01 MORBID OBESITY (HCC): Primary | ICD-10-CM

## 2024-05-17 DIAGNOSIS — G47.33 OBSTRUCTIVE SLEEP APNEA SYNDROME: ICD-10-CM

## 2024-05-17 PROCEDURE — 99214 OFFICE O/P EST MOD 30 MIN: CPT | Performed by: NURSE PRACTITIONER

## 2024-05-17 NOTE — PROGRESS NOTES
Virtual Regular Visit    Verification of patient location:    Patient is located at Home in the following state in which I hold an active license [place]      Assessment/Plan:    Problem List Items Addressed This Visit          Respiratory    Sleep apnea    Relevant Orders    Ambulatory Referral to Sleep Medicine       Neurology/Sleep    Memory changes    Difficulty concentrating     Refer to neuropsych  Refer back to sleep medicine              Other    Morbid obesity (HCC) - Primary    Relevant Medications    Semaglutide-Weight Management (WEGOVY) 0.25 MG/0.5ML            Reason for visit is   Chief Complaint   Patient presents with    Memory Loss     Pt c/o Signs of Early Alzheimer's Disease     Virtual Regular Visit          Encounter provider PRIMO Barroso      Recent Visits  No visits were found meeting these conditions.  Showing recent visits within past 7 days and meeting all other requirements  Today's Visits  Date Type Provider Dept   05/17/24 Telemedicine PRIMO Barroso [facility]   Showing today's visits and meeting all other requirements  Future Appointments  No visits were found meeting these conditions.  Showing future appointments within next 150 days and meeting all other requirements       The patient was identified by name and date of birth. Burak Croft was informed that this is a telemedicine visit and that the visit is being conducted through the [employer] platform. He agrees to proceed..  My office door was closed. No one else was in the room.  He acknowledged consent and understanding of privacy and security of the video platform. The patient has agreed to participate and understands they can discontinue the visit at any time.    Patient is aware this is a billable service.     Subjective  Burak Croft is a 29 y.o. male  .      Very forgetful  Worried about dementia  Family hx  Forgets dates  Does have sleep apnea but cannot tolerate mask  Couldn't remember his own  "birthdate or his daughters birthdate  Does not get good night sleep    Lost 100 pounds recently  Tried and failed formal weight loss program           Past Medical History:   Diagnosis Date    Ankle fracture     right    Anxiety 09/27/2020    Apnea 04/28/2020    Class 3 severe obesity due to excess calories without serious comorbidity with body mass index (BMI) of 45.0 to 49.9 in adult ([facility]) 04/28/2020    Daytime somnolence 04/28/2020    Depression     Fall 5875-0744    \"fell off of a roof\"    Mild intermittent asthma     Nonintractable headache 04/28/2020    Poor short term memory 04/28/2020    Sleep apnea     Snoring 04/28/2020       Past Surgical History:   Procedure Laterality Date    TOOTH EXTRACTION         Current Outpatient Medications   Medication Sig Dispense Refill    phentermine (ADIPEX-P) 37.5 MG tablet take 1 tablet by mouth every morning 30 tablet 1    Semaglutide-Weight Management (WEGOVY) 0.25 MG/0.5ML Inject 0.5 mL (0.25 mg total) under the skin once a week 2 mL 1    Multiple Vitamin (Multivitamin Adult) TABS Take by mouth (Patient not taking: Reported on 5/17/2024)      Semaglutide-Weight Management (WEGOVY) 0.25 MG/0.5ML Inject 0.5 mL (0.25 mg total) under the skin once a week for 28 days (Patient not taking: Reported on 5/17/2024) 2 mL 0    [START ON 5/24/2024] Semaglutide-Weight Management (WEGOVY) 0.5 MG/0.5ML Inject 0.5 mL (0.5 mg total) under the skin once a week for 28 days Do not start before May 24, 2024. (Patient not taking: Reported on 5/17/2024 Do not start before May 24, 2024.) 2 mL 0    [START ON 6/21/2024] Semaglutide-Weight Management (WEGOVY) 1 MG/0.5ML Inject 0.5 mL (1 mg total) under the skin once a week for 28 days Do not start before June 21, 2024. (Patient not taking: Reported on 5/17/2024 Do not start before June 21, 2024.) 2 mL 0    [START ON 7/19/2024] Semaglutide-Weight Management (WEGOVY) 1.7 MG/0.75ML Inject 0.75 mL (1.7 mg total) under the skin once a week for 28 days " "Do not start before July 19, 2024. (Patient not taking: Reported on 5/17/2024 Do not start before July 19, 2024.) 3 mL 0    [START ON 8/16/2024] Semaglutide-Weight Management (WEGOVY) 2.4 MG/0.75ML Inject 0.75 mL (2.4 mg total) under the skin once a week for 28 days Do not start before August 16, 2024. (Patient not taking: Reported on 5/17/2024 Do not start before August 16, 2024.) 3 mL 0     No current facility-administered medications for this visit.        No Known Allergies    Review of Systems   Constitutional:  Positive for fatigue. Negative for fever.   HENT:  Negative for congestion, postnasal drip and rhinorrhea.    Eyes:  Negative for photophobia and visual disturbance.   Respiratory:  Negative for cough, shortness of breath and wheezing.    Cardiovascular:  Negative for chest pain and palpitations.   Gastrointestinal:  Negative for constipation, diarrhea, nausea and vomiting.   Genitourinary:  Negative for dysuria and frequency.   Musculoskeletal:  Negative for arthralgias and myalgias.   Skin:  Negative for rash.   Neurological:  Negative for dizziness, tremors, weakness, light-headedness, numbness and headaches.   Hematological:  Negative for adenopathy.   Psychiatric/Behavioral:  Positive for confusion, decreased concentration and sleep disturbance. Negative for agitation, behavioral problems, dysphoric mood, hallucinations, self-injury and suicidal ideas. The patient is not nervous/anxious and is not hyperactive.        Video Exam    Vitals:    05/17/24 1420   Weight: (!) 149 kg (328 lb 9.6 oz)   Height: 5' 11\" (1.803 m)       Physical Exam  Constitutional:       Appearance: Normal appearance.   HENT:      Head: Normocephalic and atraumatic.   Eyes:      Conjunctiva/sclera: Conjunctivae normal.   Pulmonary:      Effort: Pulmonary effort is normal.   Musculoskeletal:         General: Normal range of motion.      Cervical back: Normal range of motion.   Neurological:      Mental Status: He is alert and " oriented to person, place, and time.   Psychiatric:         Mood and Affect: Mood normal.         Behavior: Behavior normal.          Visit Time  Total Visit Duration: 15

## 2024-05-23 ENCOUNTER — TELEPHONE (OUTPATIENT)
Age: 29
End: 2024-05-23

## 2024-05-23 NOTE — TELEPHONE ENCOUNTER
PA for Wegovy    Submitted via    []CMM-KEY   [x]Deric-Case ID # [id number]  []Faxed to plan   []Other website   []Phone call Case ID #     Office notes sent, clinical questions answered. Awaiting determination    Turnaround time for your insurance to make a decision on your Prior Authorization can take 7-21 business days.

## 2024-05-24 NOTE — TELEPHONE ENCOUNTER
PA for Wegovy Denied / Excluded     Reason: Plan Exclusion       Message sent to office clinical pool Yes    Denial letter scanned into Media Yes    Appeal started No     **Please follow up with your patient regarding denial and next steps**

## 2024-06-03 ENCOUNTER — TELEMEDICINE (OUTPATIENT)
Dept: FAMILY MEDICINE CLINIC | Facility: CLINIC | Age: 29
End: 2024-06-03
Payer: COMMERCIAL

## 2024-06-03 DIAGNOSIS — R11.2 NAUSEA AND VOMITING, UNSPECIFIED VOMITING TYPE: Primary | ICD-10-CM

## 2024-06-03 PROCEDURE — 99213 OFFICE O/P EST LOW 20 MIN: CPT | Performed by: NURSE PRACTITIONER

## 2024-06-03 RX ORDER — ONDANSETRON 4 MG/1
4 TABLET, FILM COATED ORAL EVERY 8 HOURS PRN
Qty: 20 TABLET | Refills: 0 | Status: SHIPPED | OUTPATIENT
Start: 2024-06-03

## 2024-06-03 NOTE — PROGRESS NOTES
Virtual Regular Visit    Verification of patient location:    Patient is located at Home in the following state in which I hold an active license [place]      Assessment/Plan:    Problem List Items Addressed This Visit          Digestive    Nausea and vomiting - Primary    Relevant Medications    ondansetron (ZOFRAN) 4 mg tablet            Reason for visit is   Chief Complaint   Patient presents with    Diarrhea     Pt being seen for diarrhea, vomiting since this morning.    Virtual Regular Visit          Encounter provider PRIMO Barroso      Recent Visits  Date Type Provider Dept   06/06/24 Telemedicine [facility] Bar[facility]so [facility]   06/03/24 Telemedicine [facility] Bar[facility]so [facility]   Showing recent visits within past 7 days and meeting all other requirements  Future Appointments  No visits were found meeting these conditions.  Showing future appointments within next 150 days and meeting all other requirements       The patient was identified by name and date of birth. Burak Croft was informed that this is a telemedicine visit and that the visit is being conducted through the [employer] platform. He agrees to proceed..  My office door was closed. No one else was in the room.  He acknowledged consent and understanding of privacy and security of the video platform. The patient has agreed to participate and understands they can discontinue the visit at any time.    Patient is aware this is a billable service.     Subjective  Burak Croft is a 29 y.o. male  .      Awoke this am  Felt not right  Went to work  Started vomiting  After that had diarrhea  Stomach upset   Lightheaded  Feeling hot  No further vomiting  Lightheaded           Past Medical History:   Diagnosis Date    Ankle fracture     right    Anxiety 09/27/2020    Apnea 04/28/2020    Class 3 severe obesity due to excess calories without serious comorbidity with body mass index (BMI) of 45.0 to 49.9 in adult (HCC) 04/28/2020     "Daytime somnolence 04/28/2020    Depression     Fall [id number]    \"fell off of a roof\"    Mild intermittent asthma     Nonintractable headache 04/28/2020    Poor short term memory 04/28/2020    Sleep apnea     Snoring 04/28/2020       Past Surgical History:   Procedure Laterality Date    TOOTH EXTRACTION         Current Outpatient Medications   Medication Sig Dispense Refill    ondansetron (ZOFRAN) 4 mg tablet Take 1 tablet (4 mg total) by mouth every 8 (eight) hours as needed for nausea or vomiting 20 tablet 0    phentermine (ADIPEX-P) 37.5 MG tablet take 1 tablet by mouth every morning 30 tablet 1    Multiple Vitamin (Multivitamin Adult) TABS Take by mouth (Patient not taking: Reported on 5/17/2024)      Semaglutide-Weight Management (WEGOVY) 0.25 MG/0.5ML Inject 0.5 mL (0.25 mg total) under the skin once a week (Patient not taking: Reported on 6/3/2024) 2 mL 1    Semaglutide-Weight Management (WEGOVY) 0.5 MG/0.5ML Inject 0.5 mL (0.5 mg total) under the skin once a week for 28 days Do not start before May 24, 2024. (Patient not taking: Reported on 5/17/2024) 2 mL 0    [START ON 6/21/2024] Semaglutide-Weight Management (WEGOVY) 1 MG/0.5ML Inject 0.5 mL (1 mg total) under the skin once a week for 28 days Do not start before June 21, 2024. (Patient not taking: Reported on 5/17/2024 Do not start before June 21, 2024.) 2 mL 0    [START ON 7/19/2024] Semaglutide-Weight Management (WEGOVY) 1.7 MG/0.75ML Inject 0.75 mL (1.7 mg total) under the skin once a week for 28 days Do not start before July 19, 2024. (Patient not taking: Reported on 5/17/2024 Do not start before July 19, 2024.) 3 mL 0    [START ON 8/16/2024] Semaglutide-Weight Management (WEGOVY) 2.4 MG/0.75ML Inject 0.75 mL (2.4 mg total) under the skin once a week for 28 days Do not start before August 16, 2024. (Patient not taking: Reported on 5/17/2024 Do not start before August 16, 2024.) 3 mL 0    sulfamethoxazole-trimethoprim (BACTRIM DS) 800-160 mg per " tablet Take 1 tablet by mouth every 12 (twelve) hours for 10 days 20 tablet 0     No current facility-administered medications for this visit.        No Known Allergies    Review of Systems   Constitutional:  Positive for fatigue. Negative for fever.   HENT:  Negative for congestion, postnasal drip and rhinorrhea.    Eyes:  Negative for photophobia and visual disturbance.   Respiratory:  Negative for cough, shortness of breath and wheezing.    Cardiovascular:  Negative for chest pain and palpitations.   Gastrointestinal:  Positive for diarrhea, nausea and vomiting.   Genitourinary:  Negative for dysuria and frequency.   Musculoskeletal:  Negative for arthralgias and myalgias.   Skin:  Negative for rash.   Neurological:  Positive for light-headedness. Negative for dizziness and headaches.   Hematological:  Negative for adenopathy.   Psychiatric/Behavioral:  Negative for dysphoric mood and sleep disturbance. The patient is not nervous/anxious.        Video Exam    Vitals:       Physical Exam  Vitals and nursing note reviewed.   Constitutional:       Appearance: Normal appearance.   HENT:      Head: Normocephalic and atraumatic.   Eyes:      Conjunctiva/sclera: Conjunctivae normal.   Pulmonary:      Effort: Pulmonary effort is normal.   Musculoskeletal:         General: Normal range of motion.      Cervical back: Normal range of motion.   Neurological:      Mental Status: He is alert and oriented to person, place, and time.   Psychiatric:         Mood and Affect: Mood normal.         Behavior: Behavior normal.          Visit Time  Total Visit Duration: 15

## 2024-06-06 ENCOUNTER — TELEMEDICINE (OUTPATIENT)
Dept: FAMILY MEDICINE CLINIC | Facility: CLINIC | Age: 29
End: 2024-06-06
Payer: COMMERCIAL

## 2024-06-06 DIAGNOSIS — R19.7 DIARRHEA OF PRESUMED INFECTIOUS ORIGIN: Primary | ICD-10-CM

## 2024-06-06 PROCEDURE — 99213 OFFICE O/P EST LOW 20 MIN: CPT | Performed by: NURSE PRACTITIONER

## 2024-06-06 RX ORDER — SULFAMETHOXAZOLE AND TRIMETHOPRIM 800; 160 MG/1; MG/1
1 TABLET ORAL EVERY 12 HOURS SCHEDULED
Qty: 20 TABLET | Refills: 0 | Status: SHIPPED | OUTPATIENT
Start: 2024-06-06 | End: 2024-06-16

## 2024-06-06 NOTE — LETTER
June 6, 2024     Patient: Burak Croft  YOB: 1995  Date of Visit: 6/6/2024      To Whom it May Concern:    Burak Croft is under my professional care. Burak was seen in my office on 6/6/2024. Burak may return to work on 6/10/2024.  Unable to work 6/3-6/7/2024 due to illness .    If you have any questions or concerns, please don't hesitate to call.         Sincerely,          PRIMO Barroso        CC: No Recipients

## 2024-06-06 NOTE — PROGRESS NOTES
Virtual Regular Visit    Verification of patient location:    Patient is located at Home in the following state in which I hold an active license [place]      Assessment/Plan:    Problem List Items Addressed This Visit          Digestive    Diarrhea of presumed infectious origin - Primary    Relevant Medications    sulfamethoxazole-trimethoprim (BACTRIM DS) 800-160 mg per tablet    Other Relevant Orders    Stool Enteric Bacterial Panel by PCR            Reason for visit is   Chief Complaint   Patient presents with    Vomiting     Pt being seen for vomiting, diarrhea, dizziness since Monday    Virtual Regular Visit          Encounter provider PRIMO Barroso      Recent Visits  Date Type Provider Dept   06/03/24 Telemedicine [facility] Bar[facility]so [facility]   Showing recent visits within past 7 days and meeting all other requirements  Today's Visits  Date Type Provider Dept   06/06/24 Telemedicine PRIMO Barroso [facility]   Showing today's visits and meeting all other requirements  Future Appointments  No visits were found meeting these conditions.  Showing future appointments within next 150 days and meeting all other requirements       The patient was identified by name and date of birth. Burak Croft was informed that this is a telemedicine visit and that the visit is being conducted through the [employer] platform. He agrees to proceed..  My office door was closed. No one else was in the room.  He acknowledged consent and understanding of privacy and security of the video platform. The patient has agreed to participate and understands they can discontinue the visit at any time.    Patient is aware this is a billable service.     Subjective  Burak Croft is a 29 y.o. male  .      Still having diarrhea  Nausea and vomiting  Started early in week  Unable to work  Needs note  No fever or chills  No blood in stool or vomit      Vomiting   Associated symptoms include diarrhea. Pertinent  "negatives include no arthralgias, chest pain, coughing or myalgias.        Past Medical History:   Diagnosis Date    Ankle fracture     right    Anxiety 09/27/2020    Apnea 04/28/2020    Class 3 severe obesity due to excess calories without serious comorbidity with body mass index (BMI) of 45.0 to 49.9 in adult ([facility]) 04/28/2020    Daytime somnolence 04/28/2020    Depression     Fall [id number]    \"fell off of a roof\"    Mild intermittent asthma     Nonintractable headache 04/28/2020    Poor short term memory 04/28/2020    Sleep apnea     Snoring 04/28/2020       Past Surgical History:   Procedure Laterality Date    TOOTH EXTRACTION         Current Outpatient Medications   Medication Sig Dispense Refill    ondansetron (ZOFRAN) 4 mg tablet Take 1 tablet (4 mg total) by mouth every 8 (eight) hours as needed for nausea or vomiting 20 tablet 0    phentermine (ADIPEX-P) 37.5 MG tablet take 1 tablet by mouth every morning 30 tablet 1    sulfamethoxazole-trimethoprim (BACTRIM DS) 800-160 mg per tablet Take 1 tablet by mouth every 12 (twelve) hours for 10 days 20 tablet 0    Multiple Vitamin (Multivitamin Adult) TABS Take by mouth (Patient not taking: Reported on 5/17/2024)      Semaglutide-Weight Management (WEGOVY) 0.25 MG/0.5ML Inject 0.5 mL (0.25 mg total) under the skin once a week (Patient not taking: Reported on 6/3/2024) 2 mL 1    Semaglutide-Weight Management (WEGOVY) 0.5 MG/0.5ML Inject 0.5 mL (0.5 mg total) under the skin once a week for 28 days Do not start before May 24, 2024. (Patient not taking: Reported on 5/17/2024) 2 mL 0    [START ON 6/21/2024] Semaglutide-Weight Management (WEGOVY) 1 MG/0.5ML Inject 0.5 mL (1 mg total) under the skin once a week for 28 days Do not start before June 21, 2024. (Patient not taking: Reported on 5/17/2024 Do not start before June 21, 2024.) 2 mL 0    [START ON 7/19/2024] Semaglutide-Weight Management (WEGOVY) 1.7 MG/0.75ML Inject 0.75 mL (1.7 mg total) under the skin once a " week for 28 days Do not start before July 19, 2024. (Patient not taking: Reported on 5/17/2024 Do not start before July 19, 2024.) 3 mL 0    [START ON 8/16/2024] Semaglutide-Weight Management (WEGOVY) 2.4 MG/0.75ML Inject 0.75 mL (2.4 mg total) under the skin once a week for 28 days Do not start before August 16, 2024. (Patient not taking: Reported on 5/17/2024 Do not start before August 16, 2024.) 3 mL 0     No current facility-administered medications for this visit.        No Known Allergies    Review of Systems   Constitutional:  Positive for fatigue.   Respiratory:  Negative for cough and shortness of breath.    Cardiovascular:  Negative for chest pain and palpitations.   Gastrointestinal:  Positive for diarrhea, nausea and vomiting.   Musculoskeletal:  Negative for arthralgias and myalgias.   Skin:  Negative for rash.   Neurological:  Positive for weakness and light-headedness.   Hematological:  Negative for adenopathy.   Psychiatric/Behavioral:  Negative for dysphoric mood, sleep disturbance and suicidal ideas.        Video Exam    Vitals:       Physical Exam  Vitals and nursing note reviewed.   Constitutional:       Appearance: Normal appearance.   Eyes:      Conjunctiva/sclera: Conjunctivae normal.   Pulmonary:      Effort: Pulmonary effort is normal.   Musculoskeletal:         General: Normal range of motion.      Cervical back: Normal range of motion.   Neurological:      Mental Status: He is alert and oriented to person, place, and time.   Psychiatric:         Mood and Affect: Mood normal.         Behavior: Behavior normal.          Visit Time  Total Visit Duration: 15

## 2024-06-07 PROBLEM — R11.2 NAUSEA AND VOMITING: Status: ACTIVE | Noted: 2024-06-07

## 2024-07-06 PROBLEM — R19.7 DIARRHEA OF PRESUMED INFECTIOUS ORIGIN: Status: RESOLVED | Noted: 2024-06-06 | Resolved: 2024-07-06

## 2024-07-11 ENCOUNTER — CONSULT (OUTPATIENT)
Dept: DERMATOLOGY | Facility: CLINIC | Age: 29
End: 2024-07-11
Payer: COMMERCIAL

## 2024-07-11 VITALS — BODY MASS INDEX: 46.72 KG/M2 | TEMPERATURE: 96.9 F | WEIGHT: 315 LBS

## 2024-07-11 DIAGNOSIS — L73.9 FOLLICULITIS: Primary | ICD-10-CM

## 2024-07-11 DIAGNOSIS — L70.0 ACNE VULGARIS: ICD-10-CM

## 2024-07-11 DIAGNOSIS — D23.9 DERMATOFIBROMA: ICD-10-CM

## 2024-07-11 DIAGNOSIS — B07.9 VERRUCA VULGARIS: ICD-10-CM

## 2024-07-11 PROCEDURE — 99204 OFFICE O/P NEW MOD 45 MIN: CPT | Performed by: DERMATOLOGY

## 2024-07-11 PROCEDURE — 17110 DESTRUCTION B9 LES UP TO 14: CPT | Performed by: DERMATOLOGY

## 2024-07-11 RX ORDER — DOXYCYCLINE HYCLATE 100 MG/1
100 CAPSULE ORAL EVERY 12 HOURS SCHEDULED
Qty: 120 CAPSULE | Refills: 0 | Status: SHIPPED | OUTPATIENT
Start: 2024-07-11 | End: 2024-09-09

## 2024-07-11 RX ORDER — FLUOCINONIDE TOPICAL SOLUTION USP, 0.05% 0.5 MG/ML
SOLUTION TOPICAL
Qty: 60 ML | Refills: 2 | Status: SHIPPED | OUTPATIENT
Start: 2024-07-11

## 2024-07-11 RX ORDER — KETOCONAZOLE 20 MG/ML
SHAMPOO TOPICAL
Qty: 120 ML | Refills: 3 | Status: SHIPPED | OUTPATIENT
Start: 2024-07-11

## 2024-07-11 NOTE — PROGRESS NOTES
"[facility] Note     Patient Name: Burak Croft  Encounter Date: 7/11/2024     Have you been cared for by a [facility] Dermatologist in the last 3 years and, if so, which description applies to you?    NO.   I am considered a \"new\" patient and must complete all patient intake questions. I am MALE/not capable of bearing children.    REVIEW OF SYSTEMS:  Have you recently had or currently have any of the following? Recent fever or chills? No  Any non-healing wound? No   PAST MEDICAL HISTORY:  Have you personally ever had or currently have any of the following?  If \"YES,\" then please provide more detail. Skin cancer (such as Melanoma, Basal Cell Carcinoma, Squamous Cell Carcinoma?  No  Tuberculosis, HIV/AIDS, Hepatitis B or C: No  Radiation Treatment No   HISTORY OF IMMUNOSUPPRESSION:   Do you have a history of any of the following:  Systemic Immunosuppression such as Diabetes, Biologic or Immunotherapy, Chemotherapy, Organ Transplantation, Bone Marrow Transplantation?  No     Answering \"YES\" requires the addition of the dotphrase \"IMMUNOSUPPRESSED\" as the first diagnosis of the patient's visit.   FAMILY HISTORY:  Any \"first degree relatives\" (parent, brother, sister, or child) with the following?    Skin Cancer, Pancreatic or Other Cancer? YES, mother   PATIENT EXPERIENCE:    Do you want the Dermatologist to perform a COMPLETE skin exam today including a clinical examination under the \"bra and underwear\" areas?  NO  If necessary, do we have your permission to call and leave a detailed message on your Preferred Phone number that includes your specific medical information?  NO      No Known Allergies   Current Outpatient Medications:     phentermine (ADIPEX-P) 37.5 MG tablet, take 1 tablet by mouth every morning, Disp: 30 tablet, Rfl: 1    Multiple Vitamin (Multivitamin Adult) TABS, Take by mouth (Patient not taking: Reported on 5/17/2024), Disp: , Rfl:     ondansetron (ZOFRAN) 4 mg tablet, Take 1 " tablet (4 mg total) by mouth every 8 (eight) hours as needed for nausea or vomiting (Patient not taking: Reported on 7/11/2024), Disp: 20 tablet, Rfl: 0    Semaglutide-Weight Management (WEGOVY) 0.25 MG/0.5ML, Inject 0.5 mL (0.25 mg total) under the skin once a week (Patient not taking: Reported on 6/3/2024), Disp: 2 mL, Rfl: 1    Semaglutide-Weight Management (WEGOVY) 1 MG/0.5ML, Inject 0.5 mL (1 mg total) under the skin once a week for 28 days Do not start before June 21, 2024. (Patient not taking: Reported on 5/17/2024), Disp: 2 mL, Rfl: 0    [START ON 7/19/2024] Semaglutide-Weight Management (WEGOVY) 1.7 MG/0.75ML, Inject 0.75 mL (1.7 mg total) under the skin once a week for 28 days Do not start before July 19, 2024. (Patient not taking: Reported on 5/17/2024 Do not start before July 19, 2024.), Disp: 3 mL, Rfl: 0    [START ON 8/16/2024] Semaglutide-Weight Management (WEGOVY) 2.4 MG/0.75ML, Inject 0.75 mL (2.4 mg total) under the skin once a week for 28 days Do not start before August 16, 2024. (Patient not taking: Reported on 5/17/2024 Do not start before August 16, 2024.), Disp: 3 mL, Rfl: 0          Whom besides the patient is providing clinical information about today's encounter?   NO ADDITIONAL HISTORIAN (patient alone provided history)    Physical Exam and Assessment/Plan by Diagnosis:      SCALP FOLLICULITIS    Physical Exam:  Anatomic Location Affected:  scalp   Morphological Description:  perifollicular papules  Pertinent Positives:  Pertinent Negatives:    Additional History of Present Condition:  Patients states he gets a lot of flakes in his scalp. He does scratch at it at times which then turns into scabs. It also burns when he washes his scalp when symptoms are active.    Assessment and Plan:  Based on a thorough discussion of this condition and the management approach to it (including a comprehensive discussion of the known risks, side effects and potential benefits of treatment), the patient  "(family) agrees to implement the following specific plan:  Ketoconazole shampoo: Daily for 2 weeks straight and then on \"Mondays, Wednesdays and Fridays\":  Lather into scalp and skin on face, neck, chest, and back; leave on for 5 minutes and then rinse off completely.  Lidex solution BID x 2 weeks then prn flares    DERMATOFIBROMA    Physical Exam:  Anatomic Location Affected:  right posterior thigh   Morphological Description:  firm tan macule   Pertinent Positives:  Pertinent Negatives:    Additional History of Present Condition:  no prior treatment. Patient does not recall any trauma in that area.     Assessment and Plan:  Based on a thorough discussion of this condition and the management approach to it (including a comprehensive discussion of the known risks, side effects and potential benefits of treatment), the patient (family) agrees to implement the following specific plan:  Reassured, benign  No further treatment necessary      ACNE VULGARIS (\"COMMON ACNE\")    Physical Exam:  Anatomic Location Affected: back   Morphological Description: Open/Closed Comedones, inflammatory papules,  scarring     Additional History of Present Condition:  Patient states he has a long history of acne breakouts on his back that seem to turn into scars right away.  No prior treatment.     Assessment and Plan:  We reviewed the causes of acne, the “kinds” of acne, and the expected clinical course.  We discussed treatment options ranging from over-the-counter products, topical retinoids, antibiotics, BP, hormonal therapies (OCPs/spironolactone), and isotretinoin (Accutane).  We reviewed specific over-the-counter interventions and medications. Recommended typical hygiene measures washing regularly with mild cleanser, and refraining from picking and popping any pimples. Recommended non-comedogenic sunscreen use daily.   Expectations of therapy discussed. Side effects, risks and benefits of medications discussed. A comprehensive " "handout with treatment plan provided. The phone number to call in case of questions or concerns (and instructions to stop medications in such a scenario) was provided.  After lengthy discussion of etiology and treatment options, we decided to implement the following personalized treatment plan:      Morning:    PanOxyl wash:   OTC medication in acne store section   be sure to thoroughly wash areas       Doxycycline (prescription medicine) - take 1 pill in the morning and 1 at night.   - this is an antibiotic so if you are prone to yeast infections (females), take a daily probiotic with it   Take the pill with a meal and a big glass of water. Avoid milk or dairy intake immediately before or after taking the pill.  If you take this on an empty stomach, it can cause nausea.  Without water intake, it can cause burning and even ulceration of the esophagus.  Avoid lying down immediately after taking the pill due to this burning sensation.  It's best to take this at least one hour prior to bedtime if taking at night.  Doxycycline can make you more sensitive to the sun. Be careful to use sunscreen and protective clothing while on this medication.  Do not become pregnant or plan to become pregnant while on this medication as it can have harmful effects on the fetus in utero.      Acne can be frustrating and difficult to treat. Most acne regimens take 2-3 months to see an improvement, so stick with them. Don't give up! As always, call your doctor if you have any concerns about your medications.    Follow up in 2 months    VERRUCA VULGARIS (\"Common Warts\")    Physical Exam:  Anatomic Location Affected:  right wrist  Morphological Description:  verrucous papule  Pertinent Positives:  Pertinent Negatives:    Additional History of Present Condition:  Patient states he may have a wart. No prior treatment.    Assessment and Plan:  Based on a thorough discussion of this condition and the management approach to it (including a " comprehensive discussion of the known risks, side effects and potential benefits of treatment), the patient (family) agrees to implement the following specific plan:  Cryotherapy applied today in office          PROCEDURE:  DESTRUCTION OF BENIGN LESIONS WITH CRYOTHERAPY  After a thorough discussion of treatment options and risk/benefits/alternatives (including but not limited to local pain, scarring, dyspigmentation, blistering, and possible superinfection), verbal and written consent were obtained and the aforementioned lesions were treated on with cryotherapy using liquid nitrogen x 1 cycle for 5-10 seconds.    TOTAL NUMBER of 1 lesions were treated today on the ANATOMIC LOCATION: right wrist.    The patient tolerated the procedure well, and after-care instructions were provided.      Scribe Attestation      I,:  Diana Gregg am acting as a scribe while in the presence of the attending physician.:       I,:  Dennis Medeiros MD personally performed the services described in this documentation    as scribed in my presence.:

## 2024-07-11 NOTE — PATIENT INSTRUCTIONS
"SCALP FOLLICULITIS    Assessment and Plan:  Based on a thorough discussion of this condition and the management approach to it (including a comprehensive discussion of the known risks, side effects and potential benefits of treatment), the patient (family) agrees to implement the following specific plan:  Ketoconazole shampoo: Daily for 2 weeks straight and then on \"Mondays, Wednesdays and Fridays\":  Lather into scalp and skin on face, neck, chest, and back; leave on for 5 minutes and then rinse off completely.    What is folliculitis?  Folliculitis is the name given to a group of skin conditions in which there are inflamed hair follicles. The result is a tender red spot, often with a surface pustule.  Folliculitis may be superficial or deep. It can affect anywhere there are hairs, including chest, back, buttocks, arms and legs. Acne and its variants are also types of folliculitis.    What causes folliculitis?  Folliculitis can be due to infection, occlusion (blockage), irritation and various skin diseases.    Folliculitis due to infection  To determine if folliculitis is due to an infection, swabs should be taken from the pustules for cytology and culture in the laboratory.    Bacteria  Bacterial folliculitis is commonly due to Staphylococcus aureus. If the infection involves the deep part of the follicle, it results in a painful boil. Recommended treatment includes careful hygiene, antiseptic cleanser or cream, antibiotic ointment, and/or oral antibiotics.    Spa pool folliculitis is due to infection with Pseudomonas aeruginosa, which thrives in inadequately chlorinated warm water. Gram negative folliculitis is a pustular facial eruption also due to infection with Pseudomonas aeruginosa or other similar organisms. When it appears, it usually follows tetracycline treatment of acne, but is quite rare.    Yeasts  The most common yeast to cause a folliculitis is Pityrosporum ovale, also known as Malassezia. Malassezia " folliculitis (Pityrosporum folliculitis) is an itchy acne-like condition usually affecting the upper trunk of a young adult. Treatment includes avoiding moisturisers, stopping any antibiotics and topical antifungal or oral antifungal medication for several weeks.    Candida albicans can also provoke a folliculitis in skin folds (intertrigo) or in the beard area. It is treated with topical or oral antifungal agents.    Fungi  Ringworm of the scalp (tinea capitis) usually results in scaling and hair loss, but sometimes results in folliculitis. In [place], cat ringworm (Microsporum canis) is the commonest organism causing scalp fungal infection. Other fungi such as Trichophyton tonsurans are increasingly reported. Treatment is with oral antifungal agents for several months.    Viral infections  Folliculitis may caused by herpes simplex virus. This tends to be tender, and resolves without treatment in around 10 days. Severe recurrent attacks may be treated with acyclovir and other antiviral agents.    Herpes zoster (the cause of shingles) may also present as folliculitis with painful pustules and crusted spots within a dermatome (an area of skin supplied by a single nerve). It is treated with hihg-dose acyclovir.  Molluscum contagiosum, common in young children, may also cause follicular umbilicated papules, usually clustered in and around a body fold. Molluscum may provoke dermatitis.    Parasitic infection  Folliculitis on the face or scalp of older or immunosuppressed adults may be due to colonisation by hair follicle mites (demodex). This is known as demodicosis.  The human infestation, scabies, often provokes folliculitis, as well as non-follicular papules, vesicles and pustules.    Folliculitis due to irritation from regrowing hairs  Folliculitis may arise as hairs regrow after shaving, waxing, electrolysis or plucking. Swabs taken from the pustules are sterile ie there is no growth of bacteria or other  organisms. In the beard area irritant folliculitis is known as pseudofolliculitis barbae.  Irritant folliculitis is also common on the lower legs of women (shaving rash). It is frequently very itchy. Treatment is by stopping hair removal, and not beginning again for about three months after the folliculitis has settled. To prevent reoccurring irritant folliculitis, use a gentle hair removal method, such as a lady's electric razor. Avoid soap and apply plenty of shaving gel, if using a blade shaver.    Folliculitis due to contact reactions  Occlusion  Paraffin-based ointments, moisturisers, and adhesive plasters may all result in a sterile folliculitis. If a moisturiser is needed, choose an oil-free product, as it is less likely to cause occlusion.    Chemicals  Coal tar, cutting oils and other chemicals may cause an irritant folliculitis. Avoid contact with the causative product.    Topical steroids  Overuse of topical steroids may produce a folliculitis. Perioral dermatitis is a facial folliculitis provoked by moisturisers and topical steroids. Perioral dermatitis is treated with tetracycline antibiotics for six weeks or so.    Folliculitis due to immunosuppression  Eosinophilic folliculitis is a specific type of folliculitis that may arise in some immune suppressed individuals such as those infected by human immunodeficiency virus (HIV) or those who have cancer.    Folliculitis due to drugs  Folliculitis may be due to drugs, particularly corticosteroids (steroid acne), androgens (male hormones), ACTH, lithium, isoniazid (INH), phenytoin and B-complex vitamins. Protein kinase inhibitors (epidermal growth factor receptor inhibitors) and targeted therapy for metastatic melanoma (vemurafenib, dabrafenib) nearly always result in folliculitis.    Folliculitis due to inflammatory skin diseases  Certain uncommon inflammatory skin diseases may cause permanent hair loss and scarring because of deep seated sterile  "folliculitis. These include:  Lichen planus  Discoid lupus erythematosus  Folliculitis decalvans  Folliculitis keloidalis     Treatment depends on the underlying condition and its severity. A skin biopsy is often necessary to establish the diagnosis.    Acne variants   Acne and acne-like or acneform disorders are also forms of folliculitis. These include:  Acne vulgaris  Nodulocystic acne  Rosacea  Scalp folliculitis  Chloracne    The follicular occlusion syndrome refers to:  Hidradenitis suppurativa (acne inversa)  Acne conglobata (a severe form of nodulocystic acne)  Dissecting cellulitis (perifolliculitis capitis abscedens et suffodiens)  Pilonidal sinus.    ACNE VULGARIS (\"COMMON ACNE\")    Morning:    PanOxyl wash:   OTC medication in acne store section   be sure to thoroughly wash areas       Doxycycline (prescription medicine) - take 1 pill in the morning and 1 at night.   - this is an antibiotic so if you are prone to yeast infections (females), take a daily probiotic with it   Take the pill with a meal and a big glass of water. Avoid milk or dairy intake immediately before or after taking the pill.  If you take this on an empty stomach, it can cause nausea.  Without water intake, it can cause burning and even ulceration of the esophagus.  Avoid lying down immediately after taking the pill due to this burning sensation.  It's best to take this at least one hour prior to bedtime if taking at night.  Doxycycline can make you more sensitive to the sun. Be careful to use sunscreen and protective clothing while on this medication.  Do not become pregnant or plan to become pregnant while on this medication as it can have harmful effects on the fetus in utero.      Acne can be frustrating and difficult to treat. Most acne regimens take 2-3 months to see an improvement, so stick with them. Don't give up! As always, call your doctor if you have any concerns about your medications.    Follow up in 2 months  "

## 2024-07-27 DIAGNOSIS — E66.01 MORBID OBESITY (HCC): ICD-10-CM

## 2024-07-29 NOTE — TELEPHONE ENCOUNTER
Medication:  PDMP     06/22/2024 03/25/2024 Phentermine Hcl (Tablet) 30.0 30 37.5 MG NA AMALIA KELLOGG     Active agreement on file -No

## 2024-07-31 RX ORDER — PHENTERMINE HYDROCHLORIDE 37.5 MG/1
37.5 TABLET ORAL EVERY MORNING
Qty: 30 TABLET | Refills: 1 | Status: SHIPPED | OUTPATIENT
Start: 2024-07-31

## 2024-08-14 ENCOUNTER — TELEPHONE (OUTPATIENT)
Age: 29
End: 2024-08-14

## 2024-08-14 NOTE — TELEPHONE ENCOUNTER
Patient called in asking if Maxine could send a muscle relaxer to pharmacy as patient is having back pain again. Offered patient Mj next available, patient declined and would like to know if she can send it without being seen as he has had this back issue in the past.    Please advise, thank you

## 2024-08-16 DIAGNOSIS — M54.50 ACUTE BILATERAL LOW BACK PAIN, UNSPECIFIED WHETHER SCIATICA PRESENT: Primary | ICD-10-CM

## 2024-09-04 ENCOUNTER — OFFICE VISIT (OUTPATIENT)
Dept: SURGERY | Facility: CLINIC | Age: 29
End: 2024-09-04
Payer: COMMERCIAL

## 2024-09-04 VITALS
SYSTOLIC BLOOD PRESSURE: 131 MMHG | HEART RATE: 95 BPM | DIASTOLIC BLOOD PRESSURE: 84 MMHG | OXYGEN SATURATION: 99 % | WEIGHT: 315 LBS | BODY MASS INDEX: 44.1 KG/M2 | HEIGHT: 71 IN

## 2024-09-04 DIAGNOSIS — L73.2 HIDRADENITIS: Primary | ICD-10-CM

## 2024-09-04 PROCEDURE — 99213 OFFICE O/P EST LOW 20 MIN: CPT | Performed by: SURGERY

## 2024-09-04 NOTE — PROGRESS NOTES
Assessment/Plan:    Diagnoses and all orders for this visit:    Hidradenitis    Patient has had no flareups since his last visit to my office in February.  With his ongoing weight loss would like to hold off any surgical intervention.  Given no signs of infection or inflammation this is certainly reasonable.  If he does have any future problems I asked him to return to the office for an evaluation.    Subjective:      Patient ID: Burak Croft is a 29 y.o. male.    Patient presents for pre op consult.  Scheduled for excision hidradenitis--posterior neck 9/20/2024.    Patient last seen in February.  He had areas of his posterior neck and a neck [place] to become inflamed likely consistent with hidradenitis.  Since that time he is lost significant mount of weight.  He states approximately 100 pounds.  He has had no flareups and no irritation of his posterior neck.  In general he feels much better.        The following portions of the patient's history were reviewed and updated as appropriate:     He  has a past medical history of Acne, Ankle fracture, Anxiety (09/27/2020), Apnea (04/28/2020), Class 3 severe obesity due to excess calories without serious comorbidity with body mass index (BMI) of 45.0 to 49.9 in adult (HCC) (04/28/2020), Daytime somnolence (04/28/2020), Depression, Fall (7280-3955), Mild intermittent asthma, Nonintractable headache (04/28/2020), Poor short term memory (04/28/2020), Sleep apnea, and Snoring (04/28/2020).  He  has a past surgical history that includes Tooth extraction.  His family history includes Alzheimer's disease in his paternal aunt and paternal grandfather; Breast cancer in his mother; Diabetes in his brother, father, maternal grandfather, mother, and paternal grandmother; Hypertension in his brother, father, and maternal grandfather; Skin cancer in his mother; Thyroid disease in his maternal grandfather and maternal uncle.  He  reports that he quit smoking about 6 years ago. His  "smoking use included cigarettes. He has never been exposed to tobacco smoke. He has quit using smokeless tobacco.  His smokeless tobacco use included chew. He reports current alcohol use of about 3.0 standard drinks of alcohol per week. He reports that he does not use drugs.  Current Outpatient Medications   Medication Sig Dispense Refill    doxycycline hyclate (VIBRAMYCIN) 100 mg capsule Take 1 capsule (100 mg total) by mouth every 12 (twelve) hours 120 capsule 0    fluocinonide (LIDEX) 0.05 % external solution 2 times a day on affected areas of scalp for 2 weeks then as needed 60 mL 2    ketoconazole (NIZORAL) 2 % shampoo Daily for 2 weeks straight and then on \"Mondays, Wednesdays and Fridays\":  Lather into scalp; leave on for 5 minutes and then rinse off completely. 120 mL 3    Multiple Vitamin (Multivitamin Adult) TABS Take by mouth (Patient not taking: Reported on 5/17/2024)      ondansetron (ZOFRAN) 4 mg tablet Take 1 tablet (4 mg total) by mouth every 8 (eight) hours as needed for nausea or vomiting (Patient not taking: Reported on 7/11/2024) 20 tablet 0    phentermine (ADIPEX-P) 37.5 MG tablet take 1 tablet by mouth every morning 30 tablet 1    Semaglutide-Weight Management (WEGOVY) 0.25 MG/0.5ML Inject 0.5 mL (0.25 mg total) under the skin once a week (Patient not taking: Reported on 6/3/2024) 2 mL 1    Semaglutide-Weight Management (WEGOVY) 2.4 MG/0.75ML Inject 0.75 mL (2.4 mg total) under the skin once a week for 28 days Do not start before August 16, 2024. (Patient not taking: Reported on 5/17/2024 Do not start before August 16, 2024.) 3 mL 0    tiZANidine (ZANAFLEX) 4 mg tablet Take 1 tablet (4 mg total) by mouth every 8 (eight) hours as needed for muscle spasms 30 tablet 0     No current facility-administered medications for this visit.     He has No Known Allergies..    Review of Systems   All other systems reviewed and are negative.        Objective:      /84   Pulse 95   Ht 5' 11\" (1.803 m)  "  Wt (!) 145 kg (320 lb)   SpO2 99%   BMI 44.63 kg/m²          Physical Exam  Skin:     General: Skin is warm and dry.      Comments: Patient does appear to have a few pores of a posterior neck crease but certainly no thickening and no signs of infection.

## 2024-09-04 NOTE — LETTER
September 4, 2024     PRIMO Barroso  [address] [address]    Patient: Burak Croft   YOB: 1995   Date of Visit: 9/4/2024       Dear Dr. Schumacher:    Thank you for referring Burak Croft to me for evaluation. Below are my notes for this consultation.    If you have questions, please do not hesitate to call me. I look forward to following your patient along with you.         Sincerely,        David Landry DO        CC: No Recipients    David Landry DO  9/4/2024  9:42 AM  Sign when Signing Visit  Assessment/Plan:    Diagnoses and all orders for this visit:    Hidradenitis    Patient has had no flareups since his last visit to my office in February.  With his ongoing weight loss would like to hold off any surgical intervention.  Given no signs of infection or inflammation this is certainly reasonable.  If he does have any future problems I asked him to return to the office for an evaluation.    Subjective:      Patient ID: Burak Croft is a 29 y.o. male.    Patient presents for pre op consult.  Scheduled for excision hidradenitis--posterior neck 9/20/2024.    Patient last seen in February.  He had areas of his posterior neck and a neck [place] to become inflamed likely consistent with hidradenitis.  Since that time he is lost significant mount of weight.  He states approximately 100 pounds.  He has had no flareups and no irritation of his posterior neck.  In general he feels much better.        The following portions of the patient's history were reviewed and updated as appropriate:     He  has a past medical history of Acne, Ankle fracture, Anxiety (09/27/2020), Apnea (04/28/2020), Class 3 severe obesity due to excess calories without serious comorbidity with body mass index (BMI) of 45.0 to 49.9 in adult (HCC) (04/28/2020), Daytime somnolence (04/28/2020), Depression, Fall (5658-1592), Mild intermittent asthma, Nonintractable headache (04/28/2020),  "Poor short term memory (04/28/2020), Sleep apnea, and Snoring (04/28/2020).  He  has a past surgical history that includes Tooth extraction.  His family history includes Alzheimer's disease in his paternal aunt and paternal grandfather; Breast cancer in his mother; Diabetes in his brother, father, maternal grandfather, mother, and paternal grandmother; Hypertension in his brother, father, and maternal grandfather; Skin cancer in his mother; Thyroid disease in his maternal grandfather and maternal uncle.  He  reports that he quit smoking about 6 years ago. His smoking use included cigarettes. He has never been exposed to tobacco smoke. He has quit using smokeless tobacco.  His smokeless tobacco use included chew. He reports current alcohol use of about 3.0 standard drinks of alcohol per week. He reports that he does not use drugs.  Current Outpatient Medications   Medication Sig Dispense Refill   • doxycycline hyclate (VIBRAMYCIN) 100 mg capsule Take 1 capsule (100 mg total) by mouth every 12 (twelve) hours 120 capsule 0   • fluocinonide (LIDEX) 0.05 % external solution 2 times a day on affected areas of scalp for 2 weeks then as needed 60 mL 2   • ketoconazole (NIZORAL) 2 % shampoo Daily for 2 weeks straight and then on \"Mondays, Wednesdays and Fridays\":  Lather into scalp; leave on for 5 minutes and then rinse off completely. 120 mL 3   • Multiple Vitamin (Multivitamin Adult) TABS Take by mouth (Patient not taking: Reported on 5/17/2024)     • ondansetron (ZOFRAN) 4 mg tablet Take 1 tablet (4 mg total) by mouth every 8 (eight) hours as needed for nausea or vomiting (Patient not taking: Reported on 7/11/2024) 20 tablet 0   • phentermine (ADIPEX-P) 37.5 MG tablet take 1 tablet by mouth every morning 30 tablet 1   • Semaglutide-Weight Management (WEGOVY) 0.25 MG/0.5ML Inject 0.5 mL (0.25 mg total) under the skin once a week (Patient not taking: Reported on 6/3/2024) 2 mL 1   • Semaglutide-Weight Management (WEGOVY) " "2.4 MG/0.75ML Inject 0.75 mL (2.4 mg total) under the skin once a week for 28 days Do not start before August 16, 2024. (Patient not taking: Reported on 5/17/2024 Do not start before August 16, 2024.) 3 mL 0   • tiZANidine (ZANAFLEX) 4 mg tablet Take 1 tablet (4 mg total) by mouth every 8 (eight) hours as needed for muscle spasms 30 tablet 0     No current facility-administered medications for this visit.     He has No Known Allergies..    Review of Systems   All other systems reviewed and are negative.        Objective:      /84   Pulse 95   Ht 5' 11\" (1.803 m)   Wt (!) 145 kg (320 lb)   SpO2 99%   BMI 44.63 kg/m²          Physical Exam  Skin:     General: Skin is warm and dry.      Comments: Patient does appear to have a few pores of a posterior neck crease but certainly no thickening and no signs of infection.         "

## 2024-09-09 ENCOUNTER — OFFICE VISIT (OUTPATIENT)
Dept: FAMILY MEDICINE CLINIC | Facility: CLINIC | Age: 29
End: 2024-09-09
Payer: COMMERCIAL

## 2024-09-09 ENCOUNTER — TELEPHONE (OUTPATIENT)
Age: 29
End: 2024-09-09

## 2024-09-09 VITALS
HEIGHT: 71 IN | DIASTOLIC BLOOD PRESSURE: 88 MMHG | WEIGHT: 315 LBS | BODY MASS INDEX: 44.1 KG/M2 | RESPIRATION RATE: 16 BRPM | TEMPERATURE: 96.9 F | SYSTOLIC BLOOD PRESSURE: 130 MMHG | HEART RATE: 97 BPM | OXYGEN SATURATION: 97 %

## 2024-09-09 DIAGNOSIS — J02.9 SORE THROAT: Primary | ICD-10-CM

## 2024-09-09 DIAGNOSIS — R06.2 WHEEZING: ICD-10-CM

## 2024-09-09 LAB — S PYO AG THROAT QL: NEGATIVE

## 2024-09-09 PROCEDURE — 87070 CULTURE OTHR SPECIMN AEROBIC: CPT | Performed by: FAMILY MEDICINE

## 2024-09-09 PROCEDURE — 87880 STREP A ASSAY W/OPTIC: CPT | Performed by: FAMILY MEDICINE

## 2024-09-09 PROCEDURE — 99213 OFFICE O/P EST LOW 20 MIN: CPT | Performed by: FAMILY MEDICINE

## 2024-09-09 PROCEDURE — 87636 SARSCOV2 & INF A&B AMP PRB: CPT | Performed by: FAMILY MEDICINE

## 2024-09-09 RX ORDER — ALBUTEROL SULFATE 90 UG/1
2 INHALANT RESPIRATORY (INHALATION) EVERY 6 HOURS PRN
Qty: 18 G | Refills: 5 | Status: SHIPPED | OUTPATIENT
Start: 2024-09-09

## 2024-09-09 NOTE — LETTER
September 9, 2024     Patient: Burak Croft  YOB: 1995  Date of Visit: 9/9/2024      To Whom it May Concern:    Burak Croft is under my professional care. Burak was seen in my office on 9/9/2024. Burak may return to work on 9/11/2024 .    If you have any questions or concerns, please don't hesitate to call.         Sincerely,          Georges Koo, DO        CC: No Recipients

## 2024-09-09 NOTE — PROGRESS NOTES
Ambulatory Visit  Name: Burak Croft      : 1995      MRN: [id number]  Encounter Provider: Georges Koo DO  Encounter Date: 2024   Encounter department: [facility]    Assessment & Plan   1. Sore throat  Comments:  Daughter positive for strep.  POCT strep negative, COVID flu negative.  Discussed likely viral etiology and symptomatic management.  Throat culture pending.  Orders:  -     POCT rapid ANTIGEN strepA  -     Throat culture  -     Covid/Flu- Office Collect Normal  2. Wheezing  -     albuterol (Ventolin HFA) 90 mcg/act inhaler; Inhale 2 puffs every 6 (six) hours as needed for wheezing         History of Present Illness     Lowell is a 29-year-old male who presents today with sore throat and episode of vomiting starting on Saturday.  Has not thrown up since.  Noted some white spots on back of throat with cough. Notes no further episodes of vomiting since. Daughter was dx the strep last week. Co-worker with COVID and they wanted to be evaluated.  Denies any recorded fevers, chills, diarrhea.  Does admit to some feelings of tightness in his breathing with deep breaths that is triggering his cough.  Does report he has needed an inhaler in the past some illness.  Notes he is eating per his normal diet and tolerating without concern.  He is sleeping well.      Review of Systems   Constitutional:  Negative for chills and fever.   HENT:  Positive for sore throat. Negative for ear pain.    Eyes:  Negative for pain and visual disturbance.   Respiratory:  Positive for cough and wheezing. Negative for shortness of breath.    Cardiovascular:  Negative for chest pain and palpitations.   Gastrointestinal:  Negative for abdominal pain and vomiting.   Genitourinary:  Negative for dysuria and hematuria.   Musculoskeletal:  Negative for arthralgias and back pain.   Skin:  Negative for color change and rash.   Neurological:  Negative for seizures and syncope.   Psychiatric/Behavioral:   "Negative for confusion and sleep disturbance. The patient is not nervous/anxious.    All other systems reviewed and are negative.    Past Medical History:   Diagnosis Date    Acne     Ankle fracture     right    Anxiety 2020    Apnea 2020    Class 3 severe obesity due to excess calories without serious comorbidity with body mass index (BMI) of 45.0 to 49.9 in adult (HCC) 2020    Daytime somnolence 2020    Depression     Fall 6517-9814    \"fell off of a roof\"    Mild intermittent asthma     Nonintractable headache 2020    Poor short term memory 2020    Sleep apnea     Snoring 2020     Past Surgical History:   Procedure Laterality Date    TOOTH EXTRACTION       Family History   Problem Relation Age of Onset    Diabetes Mother     Breast cancer Mother     Skin cancer Mother     Diabetes Father     Hypertension Father     Diabetes Brother     Hypertension Brother     Thyroid disease Maternal Uncle     Alzheimer's disease Paternal Aunt     Hypertension Maternal Grandfather     Diabetes Maternal Grandfather     Thyroid disease Maternal Grandfather     Diabetes Paternal Grandmother     Alzheimer's disease Paternal Grandfather      Social History     Tobacco Use    Smoking status: Former     Current packs/day: 0.00     Types: Cigarettes     Quit date: 2018     Years since quittin.3     Passive exposure: Never    Smokeless tobacco: Former     Types: Chew   Vaping Use    Vaping status: Never Used   Substance and Sexual Activity    Alcohol use: Yes     Alcohol/week: 3.0 standard drinks of alcohol     Types: 3 Cans of beer per week     Comment: socially    Drug use: No    Sexual activity: Yes     Partners: Female     Current Outpatient Medications on File Prior to Visit   Medication Sig    phentermine (ADIPEX-P) 37.5 MG tablet take 1 tablet by mouth every morning     No Known Allergies  Immunization History   Administered Date(s) Administered    COVID-19 MODERNA VACC 0.5 ML IM " "05/26/2021, 06/23/2021    INFLUENZA 01/03/2018    Influenza Quadrivalent Preservative Free 3 years and older IM 01/03/2018    Influenza, injectable, quadrivalent, preservative free 0.5 mL 11/10/2020    Tdap 01/03/2018     Objective     /88 (BP Location: Left arm, Patient Position: Sitting, Cuff Size: Large)   Pulse 97   Temp (!) 96.9 °F (36.1 °C) (Tympanic)   Resp 16   Ht 5' 11\" (1.803 m)   Wt (!) 151 kg (331 lb 12.8 oz)   SpO2 97%   BMI 46.28 kg/m²     Physical Exam  Vitals and nursing note reviewed.   Constitutional:       General: He is not in acute distress.     Appearance: Normal appearance.   HENT:      Head: Normocephalic and atraumatic.      Right Ear: Tympanic membrane and external ear normal.      Left Ear: Tympanic membrane and external ear normal.      Nose: Nose normal.      Mouth/Throat:      Mouth: Mucous membranes are moist.   Eyes:      Extraocular Movements: Extraocular movements intact.      Conjunctiva/sclera: Conjunctivae normal.      Pupils: Pupils are equal, round, and reactive to light.   Cardiovascular:      Rate and Rhythm: Normal rate and regular rhythm.      Pulses: Normal pulses.      Heart sounds: No murmur heard.  Pulmonary:      Effort: Pulmonary effort is normal.      Breath sounds: Wheezing (Mild expiratory upper airway wheeze) present. No rhonchi or rales.   Abdominal:      General: Bowel sounds are normal.      Palpations: Abdomen is soft.      Tenderness: There is no abdominal tenderness. There is no guarding.   Musculoskeletal:         General: Normal range of motion.      Cervical back: Normal range of motion.      Right lower leg: No edema.      Left lower leg: No edema.   Lymphadenopathy:      Cervical: No cervical adenopathy.   Skin:     General: Skin is warm.      Capillary Refill: Capillary refill takes less than 2 seconds.   Neurological:      General: No focal deficit present.      Mental Status: He is alert and oriented to person, place, and time. "   Psychiatric:         Mood and Affect: Mood normal.         Behavior: Behavior normal.

## 2024-09-10 LAB
FLUAV RNA RESP QL NAA+PROBE: NEGATIVE
FLUBV RNA RESP QL NAA+PROBE: NEGATIVE
SARS-COV-2 RNA RESP QL NAA+PROBE: NEGATIVE

## 2024-09-11 ENCOUNTER — TELEMEDICINE (OUTPATIENT)
Dept: FAMILY MEDICINE CLINIC | Facility: CLINIC | Age: 29
End: 2024-09-11
Payer: COMMERCIAL

## 2024-09-11 DIAGNOSIS — J06.9 ACUTE UPPER RESPIRATORY INFECTION: Primary | ICD-10-CM

## 2024-09-11 LAB — BACTERIA THROAT CULT: NORMAL

## 2024-09-11 PROCEDURE — 99213 OFFICE O/P EST LOW 20 MIN: CPT | Performed by: NURSE PRACTITIONER

## 2024-09-11 RX ORDER — AZITHROMYCIN 250 MG/1
TABLET, FILM COATED ORAL
Qty: 6 TABLET | Refills: 0 | Status: SHIPPED | OUTPATIENT
Start: 2024-09-11 | End: 2024-09-15

## 2024-09-11 NOTE — PROGRESS NOTES
Virtual Regular Visit  Name: Burak Croft      : 1995      MRN: [id number]  Encounter Provider: PIRMO Barroso  Encounter Date: 2024   Encounter department: [facility], [place], operated by [facility]    Verification of patient location:    Patient is located at Home in the following state in which I hold an active license [place]    Assessment & Plan  Acute upper respiratory infection    Orders:    azithromycin (ZITHROMAX) 250 mg tablet; Take 2 tablets today then 1 tablet daily x 4 days         Encounter provider PRIMO Barroso    The patient was identified by name and date of birth. Burak Croft was informed that this is a telemedicine visit and that the visit is being conducted through the [employer] platform. He agrees to proceed..  My office door was closed. No one else was in the room.  He acknowledged consent and understanding of privacy and security of the video platform. The patient has agreed to participate and understands they can discontinue the visit at any time.    Patient is aware this is a billable service.     History of Present Illness     Seen  with dr roxanne Johansen to cough and breeze  Sore throat  Cough  Not feeling well  Started Saturday  Not getting better  Covid flu and strep negative  Still not any better  Using robitussin and dayquil and nyquil  Getting chills and body aches  No fevers          History obtained from : patient  Review of Systems   Constitutional:  Positive for fatigue. Negative for fever.   HENT:  Positive for congestion, postnasal drip, rhinorrhea and sore throat.    Respiratory:  Positive for cough and chest tightness. Negative for shortness of breath and wheezing.    Cardiovascular:  Negative for chest pain and palpitations.   Gastrointestinal:  Negative for constipation, diarrhea, nausea and vomiting.   Genitourinary:  Negative for dysuria and frequency.   Musculoskeletal:  Negative for back pain and myalgias.   Skin:  Negative for rash.   Neurological:   "Negative for dizziness, light-headedness and headaches.   Hematological:  Negative for adenopathy.   Psychiatric/Behavioral:  Negative for dysphoric mood and sleep disturbance. The patient is not nervous/anxious.      Pertinent Medical History         Medical History Reviewed by provider this encounter:  Tobacco  Allergies  Meds  Problems  Med Hx  Surg Hx  Fam Hx       Past Medical History   Past Medical History:   Diagnosis Date    Acne     Ankle fracture     right    Anxiety 09/27/2020    Apnea 04/28/2020    Class 3 severe obesity due to excess calories without serious comorbidity with body mass index (BMI) of 45.0 to 49.9 in adult (HCC) 04/28/2020    Daytime somnolence 04/28/2020    Depression     Fall [id number]    \"fell off of a roof\"    Mild intermittent asthma     Nonintractable headache 04/28/2020    Poor short term memory 04/28/2020    Sleep apnea     Snoring 04/28/2020     Past Surgical History:   Procedure Laterality Date    TOOTH EXTRACTION       Family History   Problem Relation Age of Onset    Diabetes Mother     Breast cancer Mother     Skin cancer Mother     Diabetes Father     Hypertension Father     Diabetes Brother     Hypertension Brother     Thyroid disease Maternal Uncle     Alzheimer's disease Paternal Aunt     Hypertension Maternal Grandfather     Diabetes Maternal Grandfather     Thyroid disease Maternal Grandfather     Diabetes Paternal Grandmother     Alzheimer's disease Paternal Grandfather      Current Outpatient Medications on File Prior to Visit   Medication Sig Dispense Refill    albuterol (Ventolin HFA) 90 mcg/act inhaler Inhale 2 puffs every 6 (six) hours as needed for wheezing 18 g 5    phentermine (ADIPEX-P) 37.5 MG tablet take 1 tablet by mouth every morning 30 tablet 1    [DISCONTINUED] fluocinonide (LIDEX) 0.05 % external solution 2 times a day on affected areas of scalp for 2 weeks then as needed (Patient not taking: Reported on 9/9/2024) 60 mL 2    [DISCONTINUED] " "ketoconazole (NIZORAL) 2 % shampoo Daily for 2 weeks straight and then on \"Mondays, Wednesdays and Fridays\":  Lather into scalp; leave on for 5 minutes and then rinse off completely. (Patient not taking: Reported on 9/9/2024) 120 mL 3    [DISCONTINUED] Multiple Vitamin (Multivitamin Adult) TABS Take by mouth (Patient not taking: Reported on 5/17/2024)      [DISCONTINUED] ondansetron (ZOFRAN) 4 mg tablet Take 1 tablet (4 mg total) by mouth every 8 (eight) hours as needed for nausea or vomiting (Patient not taking: Reported on 7/11/2024) 20 tablet 0    [DISCONTINUED] Semaglutide-Weight Management (WEGOVY) 0.25 MG/0.5ML Inject 0.5 mL (0.25 mg total) under the skin once a week (Patient not taking: Reported on 6/3/2024) 2 mL 1    [DISCONTINUED] Semaglutide-Weight Management (WEGOVY) 2.4 MG/0.75ML Inject 0.75 mL (2.4 mg total) under the skin once a week for 28 days Do not start before August 16, 2024. (Patient not taking: Reported on 5/17/2024) 3 mL 0    [DISCONTINUED] tiZANidine (ZANAFLEX) 4 mg tablet Take 1 tablet (4 mg total) by mouth every 8 (eight) hours as needed for muscle spasms (Patient not taking: Reported on 9/9/2024) 30 tablet 0     No current facility-administered medications on file prior to visit.   No Known Allergies   Current Outpatient Medications on File Prior to Visit   Medication Sig Dispense Refill    albuterol (Ventolin HFA) 90 mcg/act inhaler Inhale 2 puffs every 6 (six) hours as needed for wheezing 18 g 5    phentermine (ADIPEX-P) 37.5 MG tablet take 1 tablet by mouth every morning 30 tablet 1    [DISCONTINUED] fluocinonide (LIDEX) 0.05 % external solution 2 times a day on affected areas of scalp for 2 weeks then as needed (Patient not taking: Reported on 9/9/2024) 60 mL 2    [DISCONTINUED] ketoconazole (NIZORAL) 2 % shampoo Daily for 2 weeks straight and then on \"Mondays, Wednesdays and Fridays\":  Lather into scalp; leave on for 5 minutes and then rinse off completely. (Patient not taking: " Reported on 2024) 120 mL 3    [DISCONTINUED] Multiple Vitamin (Multivitamin Adult) TABS Take by mouth (Patient not taking: Reported on 2024)      [DISCONTINUED] ondansetron (ZOFRAN) 4 mg tablet Take 1 tablet (4 mg total) by mouth every 8 (eight) hours as needed for nausea or vomiting (Patient not taking: Reported on 2024) 20 tablet 0    [DISCONTINUED] Semaglutide-Weight Management (WEGOVY) 0.25 MG/0.5ML Inject 0.5 mL (0.25 mg total) under the skin once a week (Patient not taking: Reported on 6/3/2024) 2 mL 1    [DISCONTINUED] Semaglutide-Weight Management (WEGOVY) 2.4 MG/0.75ML Inject 0.75 mL (2.4 mg total) under the skin once a week for 28 days Do not start before 2024. (Patient not taking: Reported on 2024) 3 mL 0    [DISCONTINUED] tiZANidine (ZANAFLEX) 4 mg tablet Take 1 tablet (4 mg total) by mouth every 8 (eight) hours as needed for muscle spasms (Patient not taking: Reported on 2024) 30 tablet 0     No current facility-administered medications on file prior to visit.      Social History     Tobacco Use    Smoking status: Former     Current packs/day: 0.00     Types: Cigarettes     Quit date: 2018     Years since quittin.3     Passive exposure: Never    Smokeless tobacco: Former     Types: Chew   Vaping Use    Vaping status: Never Used   Substance and Sexual Activity    Alcohol use: Yes     Alcohol/week: 3.0 standard drinks of alcohol     Types: 3 Cans of beer per week     Comment: socially    Drug use: No    Sexual activity: Yes     Partners: Female         Objective     There were no vitals taken for this visit.  Physical Exam  Constitutional:       Appearance: Normal appearance.   HENT:      Head: Normocephalic and atraumatic.   Eyes:      Conjunctiva/sclera: Conjunctivae normal.   Pulmonary:      Effort: Pulmonary effort is normal.   Musculoskeletal:         General: Normal range of motion.      Cervical back: Normal range of motion.   Neurological:      Mental  Status: He is alert and oriented to person, place, and time.   Psychiatric:         Mood and Affect: Mood normal.         Behavior: Behavior normal.         Visit Time  Total Visit Duration: 15

## 2024-09-11 NOTE — RESULT ENCOUNTER NOTE
Torsten Sumner,    All of your lab results have come back negative.  Please continue with symptomatic treatments as we reviewed.  Please keep us posted if no improvement or any changes in your symptoms.    Thank you  -Dr. STRONG

## 2024-09-23 DIAGNOSIS — E66.01 MORBID OBESITY (HCC): ICD-10-CM

## 2024-09-23 RX ORDER — PHENTERMINE HYDROCHLORIDE 37.5 MG/1
37.5 TABLET ORAL EVERY MORNING
Qty: 30 TABLET | OUTPATIENT
Start: 2024-09-23

## 2024-09-23 NOTE — TELEPHONE ENCOUNTER
Medication:  PDMP     08/28/2024 07/31/2024 Phentermine Hcl (Tablet) 30.0 30 37.5 MG NA AMALIA KELLOGG     Active agreement on file -No

## 2024-11-11 ENCOUNTER — APPOINTMENT (OUTPATIENT)
Dept: URGENT CARE | Facility: MEDICAL CENTER | Age: 29
End: 2024-11-11

## 2024-11-19 DIAGNOSIS — E66.01 MORBID OBESITY (HCC): ICD-10-CM

## 2024-11-22 RX ORDER — PHENTERMINE HYDROCHLORIDE 37.5 MG/1
37.5 TABLET ORAL EVERY MORNING
Qty: 30 TABLET | Refills: 0 | Status: SHIPPED | OUTPATIENT
Start: 2024-11-22

## 2024-12-31 ENCOUNTER — OFFICE VISIT (OUTPATIENT)
Dept: FAMILY MEDICINE CLINIC | Facility: CLINIC | Age: 29
End: 2024-12-31
Payer: COMMERCIAL

## 2024-12-31 VITALS
RESPIRATION RATE: 15 BRPM | WEIGHT: 315 LBS | HEART RATE: 112 BPM | OXYGEN SATURATION: 97 % | TEMPERATURE: 99.1 F | HEIGHT: 71 IN | DIASTOLIC BLOOD PRESSURE: 80 MMHG | BODY MASS INDEX: 44.1 KG/M2 | SYSTOLIC BLOOD PRESSURE: 122 MMHG

## 2024-12-31 DIAGNOSIS — J45.20 MILD INTERMITTENT ASTHMA WITHOUT COMPLICATION: ICD-10-CM

## 2024-12-31 DIAGNOSIS — J40 BRONCHITIS: Primary | ICD-10-CM

## 2024-12-31 PROCEDURE — 99213 OFFICE O/P EST LOW 20 MIN: CPT | Performed by: NURSE PRACTITIONER

## 2024-12-31 RX ORDER — PREDNISONE 20 MG/1
40 TABLET ORAL DAILY
Qty: 10 TABLET | Refills: 0 | Status: SHIPPED | OUTPATIENT
Start: 2024-12-31 | End: 2025-01-05

## 2024-12-31 RX ORDER — AZITHROMYCIN 250 MG/1
TABLET, FILM COATED ORAL
Qty: 6 TABLET | Refills: 0 | Status: SHIPPED | OUTPATIENT
Start: 2024-12-31 | End: 2025-01-05

## 2024-12-31 NOTE — PROGRESS NOTES
Name: Burak Croft      : 1995      MRN: [id number]  Encounter Provider: PRIMO Burk  Encounter Date: 2024   Encounter department: [facility]  :  Assessment & Plan  Bronchitis  Onset over a week ago, having chest tightness, sob, and wheezing.  Lungs are clear on exam.  Given duration of symptoms and his history of asthma, will start him on azithromycin and prednisone.  He is advised to call if symptoms do not start to show improvement and would obtain chest x-ray.  Use albuterol prn, can use otc cold meds prn as well.    Orders:    predniSONE 20 mg tablet; Take 2 tablets (40 mg total) by mouth daily for 5 days    azithromycin (ZITHROMAX) 250 mg tablet; Take 2 tablets on day 1 of treatment, then 1 tablet daily x 4 days    Mild intermittent asthma without complication  Currently exacerbated by respiratory infection.  See treatment above.                History of Present Illness     Pt is a 29 y.o. male who is seen today for evaluation of cold symptoms.  Past medical history of migraines, asthma, obstructive sleep apnea, impaired fasting glucose, narcolepsy, obesity.  He started about 7-10 days ago with a bad cough.  A few days ago his cough became productive and he started to feel dizzy and had fever tmax 101.  His fever has gone away.  He feels sob and wheezing, he has not felt the need to use his albuterol.  Denies fatigue and body aches.  No appetite changes, denies n/v/d.  He is taking dayquil, nyquil, and cough medicine.  He tested negative for covid last night at home.      Review of Systems   Constitutional:  Positive for fever (resolved now). Negative for appetite change, chills and fatigue.   HENT:  Positive for rhinorrhea. Negative for congestion, hearing loss, sinus pressure, sinus pain and sore throat.    Respiratory:  Positive for cough, chest tightness, shortness of breath and wheezing.    Gastrointestinal:  Negative for abdominal pain, diarrhea, nausea  "and vomiting.   Musculoskeletal:  Negative for myalgias.   Neurological:  Negative for dizziness, light-headedness and headaches.   Hematological:  Negative for adenopathy.       Objective   /80 (BP Location: Left arm, Patient Position: Sitting, Cuff Size: Standard)   Pulse (!) 112   Temp 99.1 °F (37.3 °C) (Tympanic)   Resp 15   Ht 5' 11\" (1.803 m)   Wt (!) 156 kg (344 lb 6.4 oz)   SpO2 97%   BMI 48.03 kg/m²      Physical Exam  Vitals reviewed.   Constitutional:       General: He is awake. He is not in acute distress.     Appearance: Normal appearance. He is well-developed and well-groomed. He is not ill-appearing.   HENT:      Head: Normocephalic.      Right Ear: Hearing, tympanic membrane, ear canal and external ear normal. No middle ear effusion.      Left Ear: Hearing, tympanic membrane, ear canal and external ear normal.  No middle ear effusion.      Nose: Congestion present. No mucosal edema.      Right Turbinates: Enlarged.      Left Turbinates: Enlarged.      Mouth/Throat:      Lips: Pink.      Mouth: Mucous membranes are moist. Mucous membranes are not dry.      Pharynx: No oropharyngeal exudate or posterior oropharyngeal erythema.      Tonsils: No tonsillar abscesses.   Eyes:      Conjunctiva/sclera: Conjunctivae normal.   Cardiovascular:      Rate and Rhythm: Regular rhythm. Tachycardia present.      Heart sounds: Normal heart sounds. No murmur heard.  Pulmonary:      Effort: Pulmonary effort is normal.      Breath sounds: Normal breath sounds.   Lymphadenopathy:      Head:      Right side of head: No submental, submandibular, tonsillar, preauricular, posterior auricular or occipital adenopathy.      Left side of head: No submental, submandibular, tonsillar, preauricular, posterior auricular or occipital adenopathy.      Cervical: No cervical adenopathy.   Skin:     General: Skin is warm and dry.   Neurological:      Mental Status: He is alert and oriented to person, place, and time. "   Psychiatric:         Attention and Perception: Attention normal.         Mood and Affect: Mood normal.         Speech: Speech normal.         Behavior: Behavior normal. Behavior is cooperative.         Thought Content: Thought content normal.         Cognition and Memory: Cognition normal.         Judgment: Judgment normal.

## 2025-01-03 DIAGNOSIS — E66.01 MORBID OBESITY (HCC): ICD-10-CM

## 2025-01-06 RX ORDER — PHENTERMINE HYDROCHLORIDE 37.5 MG/1
37.5 TABLET ORAL EVERY MORNING
Qty: 30 TABLET | Refills: 2 | Status: SHIPPED | OUTPATIENT
Start: 2025-01-06

## 2025-01-06 NOTE — TELEPHONE ENCOUNTER
Medication:  PDMP   11/22/2024 11/22/2024 Phentermine Hcl (Tablet) 30.0 30 37.5 MG NA AMALIA KELLOGG     Active agreement on file -No

## 2025-01-15 DIAGNOSIS — R50.9 FEVER, UNSPECIFIED FEVER CAUSE: Primary | ICD-10-CM

## 2025-01-15 RX ORDER — COVID-19 ANTIGEN TEST
1 KIT MISCELLANEOUS ONCE AS NEEDED
Qty: 1 KIT | Refills: 1 | Status: SHIPPED | OUTPATIENT
Start: 2025-01-15

## 2025-01-30 ENCOUNTER — OFFICE VISIT (OUTPATIENT)
Dept: FAMILY MEDICINE CLINIC | Facility: CLINIC | Age: 30
End: 2025-01-30
Payer: COMMERCIAL

## 2025-01-30 VITALS
SYSTOLIC BLOOD PRESSURE: 126 MMHG | HEIGHT: 71 IN | TEMPERATURE: 97.4 F | RESPIRATION RATE: 17 BRPM | OXYGEN SATURATION: 97 % | HEART RATE: 108 BPM | WEIGHT: 315 LBS | DIASTOLIC BLOOD PRESSURE: 80 MMHG | BODY MASS INDEX: 44.1 KG/M2

## 2025-01-30 DIAGNOSIS — J45.20 MILD INTERMITTENT ASTHMA WITHOUT COMPLICATION: ICD-10-CM

## 2025-01-30 DIAGNOSIS — E66.01 MORBID OBESITY WITH BMI OF 45.0-49.9, ADULT (HCC): ICD-10-CM

## 2025-01-30 DIAGNOSIS — A08.4 VIRAL GASTROENTERITIS: Primary | ICD-10-CM

## 2025-01-30 PROCEDURE — 99214 OFFICE O/P EST MOD 30 MIN: CPT | Performed by: NURSE PRACTITIONER

## 2025-01-30 NOTE — PROGRESS NOTES
Name: Burak Croft      : 1995      MRN: [id number]  Encounter Provider: PRIMO Barroso  Encounter Date: 2025   Encounter department: [facility]    Assessment & Plan  Viral gastroenteritis  Resolving  Diet adv as tolerated           Morbid obesity with BMI of 45.0-49.9, adult (HCC)    Prior Authorization Clinical Questions for Weight Management Pharmacotherapy    1. Does the patient have a contrainidcation to medication prescribed for weight management?: No  2. Does the patient have a diagnosis of obesity, confirmed by a BMI greater than or equal to 30 kg/m^2?: Yes  3. Does the patient have a BMI of greater than or equal to 27 kg/m^2 with at least one weight-related comorbidity/risk factor/complication (e.g. diabetes, dyslipidemia, coronary artery disease)?: Yes  4. Weight-related co-morbidities/risk factors: obstructive sleep apnea (OTF)  5. Has the patient been on a weight loss regimen of low-calorie diet, increased physical activity, and lifestyle modifications for a minimum of 6 months?: Yes  6. Has the patient completed a comprehensive weight loss program (ie, Weight Watchers, Noom, Bariatrics, other amy on phone)? If so, what?: Yes   -- Q6 Further Explanation: noom   7. Does the patient have a history of type 2 diabetes?: No  8. Has the member tried and failed other weight loss medication within the past 12 months?: Yes   -- Q8 Further explanation: phentermine and topamax   9. Will the member use requested medication in combination with another GLP agonist or weight loss drug?: No  10. Is the medication a controlled substance?: Yes  11. If controlled substance, has the PDMP been checked and verified?: Yes     Baseline weight (in pounds): 329 lbs     Diet and exercise      Orders:    Semaglutide-Weight Management (WEGOVY) 0.25 MG/0.5ML; Inject 0.5 mL (0.25 mg total) under the skin once a week    Mild intermittent asthma without complication  Stable              History of  "Present Illness     Here for f/u to viral gastroenteritis  Had days of vomiting and diarrhea  Everyone in household had illness  Also injured back from work  Back continues to hurt     Patient has tried and failed 6 months of a weight loss program  Patient has treid and failed phentermine and topamax for at least 6 months  Patient has been doing 150 min of exercise per week for at least 3 months  Patient has sleep apnea  Astma        Review of Systems   Constitutional:  Negative for fatigue and fever.   Respiratory:  Negative for cough, shortness of breath and wheezing.    Cardiovascular:  Negative for chest pain and palpitations.   Gastrointestinal:  Positive for diarrhea, nausea and vomiting.   Genitourinary:  Negative for dysuria and frequency.   Musculoskeletal:  Negative for arthralgias and myalgias.   Skin:  Negative for rash.   Neurological:  Negative for dizziness, light-headedness and headaches.   Hematological:  Negative for adenopathy.   Psychiatric/Behavioral:  Negative for dysphoric mood and sleep disturbance. The patient is not nervous/anxious.      Past Medical History:   Diagnosis Date    Acne     Ankle fracture     right    Anxiety 09/27/2020    Apnea 04/28/2020    Class 3 severe obesity due to excess calories without serious comorbidity with body mass index (BMI) of 45.0 to 49.9 in adult (HCC) 04/28/2020    Daytime somnolence 04/28/2020    Depression     Fall 2153-9126    \"fell off of a roof\"    Mild intermittent asthma     Nonintractable headache 04/28/2020    Poor short term memory 04/28/2020    Sleep apnea     Snoring 04/28/2020     Past Surgical History:   Procedure Laterality Date    TOOTH EXTRACTION       Family History   Problem Relation Age of Onset    Diabetes Mother     Breast cancer Mother     Skin cancer Mother     Diabetes Father     Hypertension Father     Diabetes Brother     Hypertension Brother     Thyroid disease Maternal Uncle     Alzheimer's disease Paternal Aunt     " "Hypertension Maternal Grandfather     Diabetes Maternal Grandfather     Thyroid disease Maternal Grandfather     Diabetes Paternal Grandmother     Alzheimer's disease Paternal Grandfather      Social History     Tobacco Use    Smoking status: Former     Current packs/day: 0.00     Types: Cigarettes     Quit date: 2018     Years since quittin.7     Passive exposure: Never    Smokeless tobacco: Former     Types: Chew   Vaping Use    Vaping status: Never Used   Substance and Sexual Activity    Alcohol use: Yes     Alcohol/week: 3.0 standard drinks of alcohol     Types: 3 Cans of beer per week     Comment: socially    Drug use: No    Sexual activity: Yes     Partners: Female     Current Outpatient Medications on File Prior to Visit   Medication Sig    albuterol (Ventolin HFA) 90 mcg/act inhaler Inhale 2 puffs every 6 (six) hours as needed for wheezing    COVID-19 At Home Antigen Test KIT Test 1 kit once as needed (cold symptoms) for up to 1 dose    phentermine (ADIPEX-P) 37.5 MG tablet take 1 tablet by mouth every morning     No Known Allergies  Immunization History   Administered Date(s) Administered    COVID-19 MODERNA VACC 0.5 ML IM 2021, 2021    INFLUENZA 2018    Influenza Quadrivalent Preservative Free 3 years and older IM 2018    Influenza, injectable, quadrivalent, preservative free 0.5 mL 11/10/2020    Tdap 2018     Objective   /80 (BP Location: Left arm, Patient Position: Sitting, Cuff Size: Large)   Pulse (!) 108   Temp (!) 97.4 °F (36.3 °C) (Tympanic)   Resp 17   Ht 5' 11\" (1.803 m)   Wt (!) 149 kg (329 lb)   SpO2 97%   BMI 45.89 kg/m²     Physical Exam  Vitals and nursing note reviewed.   Constitutional:       Appearance: Normal appearance.   HENT:      Head: Normocephalic and atraumatic.      Right Ear: Tympanic membrane, ear canal and external ear normal.      Left Ear: Tympanic membrane, ear canal and external ear normal.      Nose: Nose normal.   Eyes: "      Conjunctiva/sclera: Conjunctivae normal.   Cardiovascular:      Rate and Rhythm: Normal rate and regular rhythm.      Heart sounds: Normal heart sounds.   Pulmonary:      Effort: Pulmonary effort is normal.      Breath sounds: Normal breath sounds.   Abdominal:      General: Bowel sounds are normal.      Palpations: Abdomen is soft.   Musculoskeletal:         General: Normal range of motion.      Cervical back: Normal range of motion and neck supple.   Skin:     General: Skin is warm and dry.      Capillary Refill: Capillary refill takes less than 2 seconds.   Neurological:      General: No focal deficit present.      Mental Status: He is alert and oriented to person, place, and time.   Psychiatric:         Mood and Affect: Mood normal.         Behavior: Behavior normal.         Thought Content: Thought content normal.         Judgment: Judgment normal.

## 2025-01-30 NOTE — ASSESSMENT & PLAN NOTE
Prior Authorization Clinical Questions for Weight Management Pharmacotherapy    1. Does the patient have a contrainidcation to medication prescribed for weight management?: No  2. Does the patient have a diagnosis of obesity, confirmed by a BMI greater than or equal to 30 kg/m^2?: Yes  3. Does the patient have a BMI of greater than or equal to 27 kg/m^2 with at least one weight-related comorbidity/risk factor/complication (e.g. diabetes, dyslipidemia, coronary artery disease)?: Yes  4. Weight-related co-morbidities/risk factors: obstructive sleep apnea (OTF)  5. Has the patient been on a weight loss regimen of low-calorie diet, increased physical activity, and lifestyle modifications for a minimum of 6 months?: Yes  6. Has the patient completed a comprehensive weight loss program (ie, Weight Watchers, Noom, Bariatrics, other amy on phone)? If so, what?: Yes   -- Q6 Further Explanation: noom   7. Does the patient have a history of type 2 diabetes?: No  8. Has the member tried and failed other weight loss medication within the past 12 months?: Yes   -- Q8 Further explanation: phentermine and topamax   9. Will the member use requested medication in combination with another GLP agonist or weight loss drug?: No  10. Is the medication a controlled substance?: Yes  11. If controlled substance, has the PDMP been checked and verified?: Yes     Baseline weight (in pounds): 329 lbs     Diet and exercise      Orders:    Semaglutide-Weight Management (WEGOVY) 0.25 MG/0.5ML; Inject 0.5 mL (0.25 mg total) under the skin once a week

## 2025-01-30 NOTE — LETTER
January 30, 2025     Patient: Burak Croft  YOB: 1995  Date of Visit: 1/30/2025      To Whom it May Concern:    Burak Croft is under my professional care. Burak was seen in my office on 1/30/2025. Burak may return to work on 1/30/2025.  Unable to work due to illness 1/28 and 1/29/2025. .    If you have any questions or concerns, please don't hesitate to call.         Sincerely,          PRIMO Barroso        CC: No Recipients

## 2025-01-31 PROBLEM — A08.4 VIRAL GASTROENTERITIS: Status: ACTIVE | Noted: 2025-01-31

## 2025-02-04 ENCOUNTER — TELEPHONE (OUTPATIENT)
Age: 30
End: 2025-02-04

## 2025-02-04 NOTE — TELEPHONE ENCOUNTER
PA for Wegovy 0.25 mg/0.5 ml SUBMITTED to [facility]    via    []CMM-KEY:   [x]Surescripts-Case ID # [id number]   []Availity-Auth ID # NDC #   []Faxed to plan   []Other website   []Phone call Case ID #     [x]PA sent as URGENT    All office notes, labs and other pertaining documents and studies sent. Clinical questions answered. Awaiting determination from insurance company.     Turnaround time for your insurance to make a decision on your Prior Authorization can take 7-21 business days.

## 2025-03-02 PROBLEM — A08.4 VIRAL GASTROENTERITIS: Status: RESOLVED | Noted: 2025-01-31 | Resolved: 2025-03-02

## 2025-05-05 ENCOUNTER — TELEMEDICINE (OUTPATIENT)
Dept: FAMILY MEDICINE CLINIC | Facility: CLINIC | Age: 30
End: 2025-05-05
Payer: COMMERCIAL

## 2025-05-05 VITALS — TEMPERATURE: 100.8 F

## 2025-05-05 DIAGNOSIS — R50.9 RESPIRATORY ILLNESS WITH FEVER: Primary | ICD-10-CM

## 2025-05-05 DIAGNOSIS — J98.9 RESPIRATORY ILLNESS WITH FEVER: Primary | ICD-10-CM

## 2025-05-05 PROCEDURE — 99213 OFFICE O/P EST LOW 20 MIN: CPT | Performed by: FAMILY MEDICINE

## 2025-05-05 RX ORDER — AMOXICILLIN 500 MG/1
1000 CAPSULE ORAL EVERY 8 HOURS SCHEDULED
Qty: 42 CAPSULE | Refills: 0 | Status: SHIPPED | OUTPATIENT
Start: 2025-05-05 | End: 2025-05-12

## 2025-05-05 NOTE — PROGRESS NOTES
Virtual Regular VisitName: Burak Croft      : 1995      MRN: [id number]  Encounter Provider: Ara Mckeon MD  Encounter Date: 2025   Encounter department: [facility]  :  Assessment & Plan  Respiratory illness with fever  Day 1 of illness. Likely viral but cannot exclude bacterial etiology. NAD. History of asthma. Exam limited due to video visit. Will start amoxicillin. Aware of potential side effects and to take with probiotics. Continue Nyquil and albuterol prn. Call precautions   Orders:    amoxicillin (AMOXIL) 500 mg capsule; Take 2 capsules (1,000 mg total) by mouth every 8 (eight) hours for 7 days        History of Present Illness     Seen virtual with respiratory symptoms   Symptoms started yesterday   States he woke up coughing yellow mucous  Overnight, he developed lightheadedness, weakness, and fever   TMAX 100.4 this am   No GI. Hydrating.   No recent ravels  Brother recently ill with respiratory illness  SOB at rest  Taking Nyquil   Hasn't had to his albuterol inhaler  No history of pneumonia      Review of Systems   Constitutional:  Positive for chills, fatigue and fever.   HENT:  Negative for congestion and sore throat.    Respiratory:  Positive for cough.    Cardiovascular: Negative.    Gastrointestinal: Negative.    Musculoskeletal:  Positive for myalgias.       Objective   Temp (!) 100.8 °F (38.2 °C) (Tympanic)     Physical Exam  Vitals reviewed.   Constitutional:       General: He is not in acute distress.     Appearance: Normal appearance. He is not ill-appearing or toxic-appearing.   HENT:      Head: Normocephalic and atraumatic.   Eyes:      Extraocular Movements: Extraocular movements intact.   Pulmonary:      Effort: Pulmonary effort is normal.      Comments: No conversational orthopnea   Skin:     Coloration: Skin is not pale.   Neurological:      Mental Status: He is alert and oriented to person, place, and time.   Psychiatric:         Mood and Affect:  Mood normal.         Behavior: Behavior normal.         Thought Content: Thought content normal.         Administrative Statements   Encounter provider Ara Mckeon MD    The Patient is located at Home and in the following state in which I hold an active license [place].    The patient was identified by name and date of birth. Burak Croft was informed that this is a telemedicine visit and that the visit is being conducted through the Epic Embedded platform. He agrees to proceed..  My office door was closed. The patient was notified the following individuals were present in the room [place] [facility] .  He acknowledged consent and understanding of privacy and security of the video platform. The patient has agreed to participate and understands they can discontinue the visit at any time.    I have spent a total time of 10 minutes in caring for this patient on the day of the visit/encounter including Instructions for management, Impressions, Documenting in the medical record, and Obtaining or reviewing history  , not including the time spent for establishing the audio/video connection.

## 2025-05-18 ENCOUNTER — HOSPITAL ENCOUNTER (EMERGENCY)
Facility: HOSPITAL | Age: 30
Discharge: HOME | End: 2025-05-18
Attending: EMERGENCY MEDICINE
Payer: COMMERCIAL

## 2025-05-18 VITALS
BODY MASS INDEX: 45.51 KG/M2 | OXYGEN SATURATION: 99 % | RESPIRATION RATE: 20 BRPM | SYSTOLIC BLOOD PRESSURE: 113 MMHG | TEMPERATURE: 98.5 F | WEIGHT: 315 LBS | HEART RATE: 104 BPM | DIASTOLIC BLOOD PRESSURE: 61 MMHG

## 2025-05-18 DIAGNOSIS — R17 SERUM TOTAL BILIRUBIN ELEVATED: ICD-10-CM

## 2025-05-18 DIAGNOSIS — R42 VERTIGO: Primary | ICD-10-CM

## 2025-05-18 LAB
ALBUMIN SERPL BCG-MCNC: 4.3 G/DL (ref 3.5–5)
ALP SERPL-CCNC: 73 U/L (ref 34–104)
ALT SERPL W P-5'-P-CCNC: 30 U/L (ref 7–52)
ANION GAP SERPL CALCULATED.3IONS-SCNC: 7 MMOL/L (ref 4–13)
AST SERPL W P-5'-P-CCNC: 29 U/L (ref 13–39)
ATRIAL RATE: 98 BPM
ATRIAL RATE: 99 BPM
BASOPHILS # BLD AUTO: 0.01 THOUSANDS/ÂΜL (ref 0–0.1)
BASOPHILS NFR BLD AUTO: 0 % (ref 0–1)
BILIRUB SERPL-MCNC: 1.39 MG/DL (ref 0.2–1)
BILIRUB UR QL STRIP: NEGATIVE
BUN SERPL-MCNC: 9 MG/DL (ref 5–25)
CALCIUM SERPL-MCNC: 8.8 MG/DL (ref 8.4–10.2)
CHLORIDE SERPL-SCNC: 100 MMOL/L (ref 96–108)
CLARITY UR: CLEAR
CO2 SERPL-SCNC: 28 MMOL/L (ref 21–32)
COLOR UR: YELLOW
CREAT SERPL-MCNC: 0.71 MG/DL (ref 0.6–1.3)
EOSINOPHIL # BLD AUTO: 0.22 THOUSAND/ÂΜL (ref 0–0.61)
EOSINOPHIL NFR BLD AUTO: 3 % (ref 0–6)
ERYTHROCYTE [DISTWIDTH] IN BLOOD BY AUTOMATED COUNT: 12.9 % (ref 11.6–15.1)
GFR SERPL CREATININE-BSD FRML MDRD: 125 ML/MIN/1.73SQ M
GLUCOSE SERPL-MCNC: 84 MG/DL (ref 65–140)
GLUCOSE UR STRIP-MCNC: NEGATIVE MG/DL
HCT VFR BLD AUTO: 47.1 % (ref 36.5–49.3)
HGB BLD-MCNC: 15.7 G/DL (ref 12–17)
HGB UR QL STRIP.AUTO: NEGATIVE
IMM GRANULOCYTES # BLD AUTO: 0.03 THOUSAND/UL (ref 0–0.2)
IMM GRANULOCYTES NFR BLD AUTO: 0 % (ref 0–2)
KETONES UR STRIP-MCNC: NEGATIVE MG/DL
LEUKOCYTE ESTERASE UR QL STRIP: NEGATIVE
LIPASE SERPL-CCNC: 14 U/L (ref 11–82)
LYMPHOCYTES # BLD AUTO: 1.45 THOUSANDS/ÂΜL (ref 0.6–4.47)
LYMPHOCYTES NFR BLD AUTO: 20 % (ref 14–44)
MCH RBC QN AUTO: 29.2 PG (ref 26.8–34.3)
MCHC RBC AUTO-ENTMCNC: 33.3 G/DL (ref 31.4–37.4)
MCV RBC AUTO: 88 FL (ref 82–98)
MONOCYTES # BLD AUTO: 0.75 THOUSAND/ÂΜL (ref 0.17–1.22)
MONOCYTES NFR BLD AUTO: 10 % (ref 4–12)
NEUTROPHILS # BLD AUTO: 4.91 THOUSANDS/ÂΜL (ref 1.85–7.62)
NEUTS SEG NFR BLD AUTO: 67 % (ref 43–75)
NITRITE UR QL STRIP: NEGATIVE
NRBC BLD AUTO-RTO: 0 /100 WBCS
P AXIS: 112 DEGREES
P AXIS: 66 DEGREES
PH UR STRIP.AUTO: 6 [PH]
PLATELET # BLD AUTO: 198 THOUSANDS/UL (ref 149–390)
PMV BLD AUTO: 9.6 FL (ref 8.9–12.7)
POTASSIUM SERPL-SCNC: 3.8 MMOL/L (ref 3.5–5.3)
PR INTERVAL: 182 MS
PR INTERVAL: 188 MS
PROT SERPL-MCNC: 7 G/DL (ref 6.4–8.4)
PROT UR STRIP-MCNC: NEGATIVE MG/DL
QRS AXIS: 110 DEGREES
QRS AXIS: 71 DEGREES
QRSD INTERVAL: 86 MS
QRSD INTERVAL: 88 MS
QT INTERVAL: 350 MS
QT INTERVAL: 350 MS
QTC INTERVAL: 446 MS
QTC INTERVAL: 449 MS
RBC # BLD AUTO: 5.37 MILLION/UL (ref 3.88–5.62)
SODIUM SERPL-SCNC: 135 MMOL/L (ref 135–147)
SP GR UR STRIP.AUTO: 1.01 (ref 1–1.03)
T WAVE AXIS: 135 DEGREES
T WAVE AXIS: 52 DEGREES
UROBILINOGEN UR STRIP-ACNC: 4 MG/DL
VENTRICULAR RATE: 98 BPM
VENTRICULAR RATE: 99 BPM
WBC # BLD AUTO: 7.37 THOUSAND/UL (ref 4.31–10.16)

## 2025-05-18 PROCEDURE — 83690 ASSAY OF LIPASE: CPT

## 2025-05-18 PROCEDURE — 85025 COMPLETE CBC W/AUTO DIFF WBC: CPT

## 2025-05-18 PROCEDURE — 81003 URINALYSIS AUTO W/O SCOPE: CPT

## 2025-05-18 PROCEDURE — 99285 EMERGENCY DEPT VISIT HI MDM: CPT | Performed by: EMERGENCY MEDICINE

## 2025-05-18 PROCEDURE — 93005 ELECTROCARDIOGRAM TRACING: CPT

## 2025-05-18 PROCEDURE — 96365 THER/PROPH/DIAG IV INF INIT: CPT

## 2025-05-18 PROCEDURE — 99284 EMERGENCY DEPT VISIT MOD MDM: CPT

## 2025-05-18 PROCEDURE — 80053 COMPREHEN METABOLIC PANEL: CPT

## 2025-05-18 PROCEDURE — 96366 THER/PROPH/DIAG IV INF ADDON: CPT

## 2025-05-18 PROCEDURE — 36415 COLL VENOUS BLD VENIPUNCTURE: CPT

## 2025-05-18 PROCEDURE — 96375 TX/PRO/DX INJ NEW DRUG ADDON: CPT

## 2025-05-18 PROCEDURE — 93010 ELECTROCARDIOGRAM REPORT: CPT | Performed by: INTERNAL MEDICINE

## 2025-05-18 RX ORDER — ONDANSETRON 2 MG/ML
4 INJECTION INTRAMUSCULAR; INTRAVENOUS ONCE
Status: COMPLETED | OUTPATIENT
Start: 2025-05-18 | End: 2025-05-18

## 2025-05-18 RX ORDER — ONDANSETRON 4 MG/1
4 TABLET, ORALLY DISINTEGRATING ORAL EVERY 6 HOURS PRN
Qty: 20 TABLET | Refills: 0 | Status: SHIPPED | OUTPATIENT
Start: 2025-05-18

## 2025-05-18 RX ORDER — DIPHENHYDRAMINE HCL 25 MG
50 TABLET ORAL ONCE
Status: COMPLETED | OUTPATIENT
Start: 2025-05-18 | End: 2025-05-18

## 2025-05-18 RX ADMIN — SODIUM CHLORIDE, SODIUM LACTATE, POTASSIUM CHLORIDE, AND CALCIUM CHLORIDE 1000 ML: .6; .31; .03; .02 INJECTION, SOLUTION INTRAVENOUS at 16:20

## 2025-05-18 RX ADMIN — ONDANSETRON 4 MG: 2 INJECTION INTRAMUSCULAR; INTRAVENOUS at 16:17

## 2025-05-18 RX ADMIN — DIPHENHYDRAMINE HYDROCHLORIDE 50 MG: 25 TABLET ORAL at 18:24

## 2025-05-18 NOTE — DISCHARGE INSTRUCTIONS
You may take Zofran 4 mg every 6-8 hours as needed for nausea or vomiting    Please follow-up with vestibular physical therapy to help treat your vertigo episodes    Return to the emergency department if you have neurologic changes that include difficulty walking, difficulty talking, balance issues, worsening dizziness, or any other concerning symptoms develop

## 2025-05-18 NOTE — ED PROVIDER NOTES
Time reflects when diagnosis was documented in both MDM as applicable and the Disposition within this note       Time User Action Codes Description Comment    5/18/2025  6:01 PM Abiel Ray Add [R42] Vertigo     5/18/2025  6:01 PM Abiel Ray Add [R17] Serum total bilirubin elevated           ED Disposition       ED Disposition   Discharge    Condition   Stable    Date/Time   Sun May 18, 2025  6:02 PM    Comment   Burak Croft discharge to home/self care.                   Assessment & Plan       Medical Decision Making  30-year-old male presents with episodes of lightheadedness/dizziness, as well as right sided lower back pain and urinary changes  At risk for central versus peripheral vertigo, electrolyte abnormality, anemia, pancreatitis, hydronephrosis, nephrolithiasis, cholecystitis, urinary tract infection, cardiac arrhythmia, dehydration, etc.  Physical exam notable for right CVA tenderness as well as right-sided nystagmus  EKG shows normal sinus rhythm with no evidence of acute arrhythmia, WPW, Brugada, or other arrhythmogenic conditions  Patient started on 1 L normal saline and given Zofran 4 mg for nausea  CBC shows no leukocytosis, no anemia  CMP shows no electrolyte abnormalities with kidney and hepatic function at baseline  Patient does have mildly elevated total bilirubin  Patient 14 no pancreatitis  Epley maneuver performed which did not elicit vertiginous symptoms  After Epley maneuver, patient states that vertigo has improved  Ultrasound of right upper quadrant shows no evidence of acute cholecystitis  Ultrasound of bilateral kidneys shows no evidence of hydronephrosis or intrarenal nephrolithiasis  Urinalysis shows no evidence of acute urinary tract infection, no occult blood which makes nephrolithiasis very unlikely  Mildly elevated urobilinogen in addition to elevated total bilirubin and CMP  Discussed bilirubin findings and will have patient follow-up with PCP in a week for further  "evaluation  Will give Zofran 4 mg for nausea  Patient agreeable to plan, will attend vestibular physical therapy for treatment of his vertigo symptoms  No further questions at this time, strict return precautions given  Stable condition cleared for discharge    Amount and/or Complexity of Data Reviewed  Labs: ordered.    Risk  Prescription drug management.             Medications   lactated ringers bolus 1,000 mL (1,000 mL Intravenous New Bag 5/18/25 1620)   ondansetron (ZOFRAN) injection 4 mg (4 mg Intravenous Given 5/18/25 1617)       ED Risk Strat Scores                    No data recorded                            History of Present Illness       Chief Complaint   Patient presents with    Possible UTI     Patient to ed with c/o lightheaded, dizziness, lower back pain on the right side and dark orange urine. Patient also reporting some burning on urination.        Past Medical History:   Diagnosis Date    Acne     Ankle fracture     right    Anxiety 09/27/2020    Apnea 04/28/2020    Class 3 severe obesity due to excess calories without serious comorbidity with body mass index (BMI) of 45.0 to 49.9 in adult 04/28/2020    Daytime somnolence 04/28/2020    Depression     Fall [id number]    \"fell off of a roof\"    Mild intermittent asthma     Nonintractable headache 04/28/2020    Poor short term memory 04/28/2020    Sleep apnea     Snoring 04/28/2020      Past Surgical History:   Procedure Laterality Date    TOOTH EXTRACTION        Family History   Problem Relation Age of Onset    Diabetes Mother     Breast cancer Mother     Skin cancer Mother     Diabetes Father     Hypertension Father     Diabetes Brother     Hypertension Brother     Thyroid disease Maternal Uncle     Alzheimer's disease Paternal Aunt     Hypertension Maternal Grandfather     Diabetes Maternal Grandfather     Thyroid disease Maternal Grandfather     Diabetes Paternal Grandmother     Alzheimer's disease Paternal Grandfather       Social History[1] "   E-Cigarette/Vaping    E-Cigarette Use Never User       E-Cigarette/Vaping Substances    Nicotine No     THC No     CBD No     Flavoring No     Other No     Unknown No       I have reviewed and agree with the history as documented.     30-year-old male presents with complaints of 3 weeks of intermittent dizziness as well as darkening urine and right lower back pain.  Patient states that he was diagnosed with COVID-19 3 weeks ago and since then his symptoms have progressed.  Patient works on the [employer] and states that sometimes multiple times per day when he is exerting himself he will begin to feel dizzy like the room is spinning and have to sit down.  His symptoms resolve within a few minutes.  Patient states that over the past few days his urine has been significantly decreased and has been a dark orange to brown in color.  Patient admits to nausea with 1 episode of emesis today.  Patient denies fever, chills, shortness of breath, chest pain, peripheral edema, yellowing of skin.          Review of Systems   Constitutional:  Positive for activity change. Negative for chills and fever.   HENT:  Negative for congestion, ear pain and sore throat.    Eyes:  Negative for pain and visual disturbance.   Respiratory:  Negative for cough and shortness of breath.    Cardiovascular:  Negative for chest pain and palpitations.   Gastrointestinal:  Positive for nausea and vomiting. Negative for abdominal pain.   Genitourinary:  Positive for decreased urine volume and flank pain. Negative for dysuria, hematuria and urgency.   Musculoskeletal:  Negative for arthralgias, back pain and joint swelling.   Skin:  Negative for color change and rash.   Neurological:  Negative for dizziness, seizures, syncope and headaches.   Psychiatric/Behavioral:  Negative for confusion. The patient is not nervous/anxious.    All other systems reviewed and are negative.          Objective       ED Triage Vitals [05/18/25 1553]   Temperature Pulse  Blood Pressure Respirations SpO2 Patient Position - Orthostatic VS   98.5 °F (36.9 °C) 95 138/74 20 100 % Lying      Temp Source Heart Rate Source BP Location FiO2 (%) Pain Score    Oral Monitor Right arm -- No Pain      Vitals      Date and Time Temp Pulse SpO2 Resp BP Pain Score FACES Pain Rating User   05/18/25 1553 98.5 °F (36.9 °C) 95 100 % 20 138/74 No Pain -- AP            Physical Exam  Vitals and nursing note reviewed.   Constitutional:       General: He is not in acute distress.     Appearance: He is well-developed. He is obese. He is not ill-appearing or toxic-appearing.      Comments: Well-developed obese male resting comfortably in bed   HENT:      Head: Normocephalic and atraumatic.      Nose: Nose normal.      Mouth/Throat:      Mouth: Mucous membranes are moist.     Eyes:      General: No scleral icterus.        Right eye: No discharge.         Left eye: No discharge.      Conjunctiva/sclera: Conjunctivae normal.       Cardiovascular:      Rate and Rhythm: Normal rate and regular rhythm.      Heart sounds: No murmur heard.     No friction rub. No gallop.   Pulmonary:      Effort: Pulmonary effort is normal. No respiratory distress.      Breath sounds: Normal breath sounds. No wheezing or rales.   Abdominal:      General: There is no distension.      Palpations: Abdomen is soft. There is no mass.      Tenderness: There is no abdominal tenderness. There is right CVA tenderness. There is no guarding or rebound.      Hernia: No hernia is present.      Comments: Mild tenderness in right lower back that radiates along right flank.     Musculoskeletal:         General: No swelling.      Cervical back: Neck supple. No tenderness.      Right lower leg: No edema.      Left lower leg: No edema.     Skin:     General: Skin is warm and dry.      Capillary Refill: Capillary refill takes less than 2 seconds.      Coloration: Skin is not jaundiced or pale.      Findings: No bruising or erythema.     Neurological:       Mental Status: He is alert.      Sensory: No sensory deficit.      Motor: No weakness.      Comments: Right horizontal nystagmus noted with associated vertigo.  Otherwise, cranial nerves II through XII intact.  Epley maneuver performed which caused reproduction of vertigo symptoms.  After repositioning, patient states vertigo is improved.   Psychiatric:         Mood and Affect: Mood normal.         Behavior: Behavior normal.         Thought Content: Thought content normal.         Results Reviewed       Procedure Component Value Units Date/Time    UA w Reflex to Microscopic w Reflex to Culture [[id number]]  (Abnormal) Collected: 05/18/25 1733    Lab Status: Final result Specimen: Urine, Clean Catch Updated: 05/18/25 1741     Color, UA Yellow     Clarity, UA Clear     Specific Gravity, UA 1.012     pH, UA 6.0     Leukocytes, UA Negative     Nitrite, UA Negative     Protein, UA Negative mg/dl      Glucose, UA Negative mg/dl      Ketones, UA Negative mg/dl      Urobilinogen, UA 4.0 mg/dl      Bilirubin, UA Negative     Occult Blood, UA Negative    CMP [[id number]]  (Abnormal) Collected: 05/18/25 1615    Lab Status: Final result Specimen: Blood from Arm, Left Updated: 05/18/25 1641     Sodium 135 mmol/L      Potassium 3.8 mmol/L      Chloride 100 mmol/L      CO2 28 mmol/L      ANION GAP 7 mmol/L      BUN 9 mg/dL      Creatinine 0.71 mg/dL      Glucose 84 mg/dL      Calcium 8.8 mg/dL      AST 29 U/L      ALT 30 U/L      Alkaline Phosphatase 73 U/L      Total Protein 7.0 g/dL      Albumin 4.3 g/dL      Total Bilirubin 1.39 mg/dL      eGFR 125 ml/min/1.73sq m     Narrative:      National Kidney Disease Foundation guidelines for Chronic Kidney Disease (CKD):     Stage 1 with normal or high GFR (GFR > 90 mL/min/1.73 square meters)    Stage 2 Mild CKD (GFR = 60-89 mL/min/1.73 square meters)    Stage 3A Moderate CKD (GFR = 45-59 mL/min/1.73 square meters)    Stage 3B Moderate CKD (GFR = 30-44 mL/min/1.73 square meters)     Stage 4 Severe CKD (GFR = 15-29 mL/min/1.73 square meters)    Stage 5 End Stage CKD (GFR <15 mL/min/1.73 square meters)  Note: GFR calculation is accurate only with a steady state creatinine    Lipase [[id number]]  (Normal) Collected: 05/18/25 1615    Lab Status: Final result Specimen: Blood from Arm, Left Updated: 05/18/25 1641     Lipase 14 u/L     CBC and differential [[id number]] Collected: 05/18/25 1615    Lab Status: Final result Specimen: Blood from Arm, Left Updated: 05/18/25 1625     WBC 7.37 Thousand/uL      RBC 5.37 Million/uL      Hemoglobin 15.7 g/dL      Hematocrit 47.1 %      MCV 88 fL      MCH 29.2 pg      MCHC 33.3 g/dL      RDW 12.9 %      MPV 9.6 fL      Platelets 198 Thousands/uL      nRBC 0 /100 WBCs      Segmented % 67 %      Immature Grans % 0 %      Lymphocytes % 20 %      Monocytes % 10 %      Eosinophils Relative 3 %      Basophils Relative 0 %      Absolute Neutrophils 4.91 Thousands/µL      Absolute Immature Grans 0.03 Thousand/uL      Absolute Lymphocytes 1.45 Thousands/µL      Absolute Monocytes 0.75 Thousand/µL      Eosinophils Absolute 0.22 Thousand/µL      Basophils Absolute 0.01 Thousands/µL             No orders to display       ECG 12 Lead Documentation Only    Date/Time: 5/18/2025 5:31 PM    Performed by: Abiel Ray DO  Authorized by: Abiel Ray DO    Indications / Diagnosis:  Lightheadedness  ECG reviewed by me, the ED Provider: yes    Patient location:  ED  Previous ECG:     Previous ECG:  Compared to current    Comparison ECG info:  March 2, 2024    Similarity:  No change    Comparison to cardiac monitor: Yes    Interpretation:     Interpretation: normal    Rate:     ECG rate:  99    ECG rate assessment: normal    Rhythm:     Rhythm: sinus rhythm    Ectopy:     Ectopy: none    QRS:     QRS axis:  Normal  Conduction:     Conduction: normal    ST segments:     ST segments:  Normal  T waves:     T waves: normal    Comments:      Normal sinus rhythm with rate of 99.  No ST  segment or T wave changes indicative of ischemia.  No evidence of Brugada or Kizzy-Parkinson-White.      ED Medication and Procedure Management   Prior to Admission Medications   Prescriptions Last Dose Informant Patient Reported? Taking?   COVID-19 At Home Antigen Test KIT   No No   Sig: Test 1 kit once as needed (cold symptoms) for up to 1 dose   Patient not taking: Reported on 2025   Semaglutide-Weight Management (WEGOVY) 0.25 MG/0.5ML   No No   Sig: Inject 0.5 mL (0.25 mg total) under the skin once a week   Patient not taking: Reported on 2025   albuterol (Ventolin HFA) 90 mcg/act inhaler   No No   Sig: Inhale 2 puffs every 6 (six) hours as needed for wheezing   phentermine (ADIPEX-P) 37.5 MG tablet   No No   Sig: take 1 tablet by mouth every morning      Facility-Administered Medications: None     Patient's Medications   Discharge Prescriptions    ONDANSETRON (ZOFRAN-ODT) 4 MG DISINTEGRATING TABLET    Take 1 tablet (4 mg total) by mouth every 6 (six) hours as needed for nausea or vomiting       Start Date: 2025 End Date: --       Order Dose: 4 mg       Quantity: 20 tablet    Refills: 0       ED SEPSIS DOCUMENTATION   Time reflects when diagnosis was documented in both MDM as applicable and the Disposition within this note       Time User Action Codes Description Comment    2025  6:01 PM Abiel Ray Add [R42] Vertigo     2025  6:01 PM Abiel Ray Add [R17] Serum total bilirubin elevated                      [1]   Social History  Tobacco Use    Smoking status: Former     Current packs/day: 0.00     Types: Cigarettes     Quit date: 2018     Years since quittin.9     Passive exposure: Never    Smokeless tobacco: Former     Types: Chew   Vaping Use    Vaping status: Never Used   Substance Use Topics    Alcohol use: Not Currently     Alcohol/week: 3.0 standard drinks of alcohol     Types: 3 Cans of beer per week     Comment: socially    Drug use: No        Abiel Ray DO  25  1949

## 2025-05-18 NOTE — ED NOTES
Patient was getting ready to get dressed for discharge and noticed a spreading rash across his abdomen, back and armpit. Providers notified and to bedside for re-evaluation.      Alycia Omalley RN  05/18/25 1243

## 2025-05-20 ENCOUNTER — OFFICE VISIT (OUTPATIENT)
Dept: FAMILY MEDICINE CLINIC | Facility: CLINIC | Age: 30
End: 2025-05-20

## 2025-05-20 VITALS
DIASTOLIC BLOOD PRESSURE: 64 MMHG | WEIGHT: 315 LBS | SYSTOLIC BLOOD PRESSURE: 120 MMHG | HEIGHT: 71 IN | HEART RATE: 105 BPM | BODY MASS INDEX: 44.1 KG/M2 | TEMPERATURE: 97.8 F | OXYGEN SATURATION: 97 % | RESPIRATION RATE: 17 BRPM

## 2025-05-20 DIAGNOSIS — R17 ELEVATED BILIRUBIN: Primary | ICD-10-CM

## 2025-05-20 DIAGNOSIS — M79.632 PAIN OF LEFT FOREARM: ICD-10-CM

## 2025-05-20 RX ORDER — METHYLPREDNISOLONE 4 MG
4 TABLET, DOSE PACK ORAL DAILY
COMMUNITY
Start: 2025-05-20

## 2025-05-20 NOTE — PROGRESS NOTES
Name: Burak Croft      : 1995      MRN: [id number]  Encounter Provider: PRIMO Barroso  Encounter Date: 2025   Encounter department: [facility]    Assessment & Plan  Elevated bilirubin    Orders:    Comprehensive metabolic panel; Future    CBC and differential; Future    Lipase; Future    C-reactive protein; Future    Pain of left forearm    Orders:    Ambulatory Referral to Orthopedic Surgery; Future         History of Present Illness     Left forearm pain  Started one month ago  No known injury  No pain to touch  Anytime makes a fist or engage muscle, has pain    Breaking out with hives  Not sure related to allergic reaction  Took benadryl    Went to ED for diarrhea, weakness, vertigo  Had elevated bilirubin on labs  30-year-old male presents with complaints of 3 weeks of intermittent dizziness as well as darkening urine and right lower back pain.  Patient states that he was diagnosed with COVID-19 3 weeks ago and since then his symptoms have progressed.  Patient works on the [employer] and states that sometimes multiple times per day when he is exerting himself he will begin to feel dizzy like the room is spinning and have to sit down.  His symptoms resolve within a few minutes.  Patient states that over the past few days his urine has been significantly decreased and has been a dark orange to brown in color.  Patient admits to nausea with 1 episode of emesis today.  Patient denies fever, chills, shortness of breath, chest pain, peripheral edema, yellowing of skin.     Improved symptoms from ED          Review of Systems   Constitutional:  Negative for fatigue and fever.   HENT:  Negative for congestion, postnasal drip and rhinorrhea.    Respiratory:  Negative for cough and wheezing.    Cardiovascular:  Negative for chest pain and palpitations.   Gastrointestinal:  Negative for constipation, diarrhea, nausea and vomiting.   Genitourinary:  Negative for dysuria and frequency.  "  Musculoskeletal:  Positive for arthralgias and myalgias.   Skin:  Negative for rash.   Neurological:  Negative for dizziness, light-headedness and headaches.   Hematological:  Negative for adenopathy.   Psychiatric/Behavioral:  Negative for dysphoric mood and sleep disturbance. The patient is not nervous/anxious.      Past Medical History:   Diagnosis Date    Acne     Ankle fracture     right    Anxiety 2020    Apnea 2020    Class 3 severe obesity due to excess calories without serious comorbidity with body mass index (BMI) of 45.0 to 49.9 in adult 2020    Daytime somnolence 2020    Depression     Fall [id number]    \"fell off of a roof\"    Mild intermittent asthma     Nonintractable headache 2020    Poor short term memory 2020    Sleep apnea     Snoring 2020     Past Surgical History:   Procedure Laterality Date    TOOTH EXTRACTION       Family History   Problem Relation Age of Onset    Diabetes Mother     Breast cancer Mother     Skin cancer Mother     Diabetes Father     Hypertension Father     Diabetes Brother     Hypertension Brother     Thyroid disease Maternal Uncle     Alzheimer's disease Paternal Aunt     Hypertension Maternal Grandfather     Diabetes Maternal Grandfather     Thyroid disease Maternal Grandfather     Diabetes Paternal Grandmother     Alzheimer's disease Paternal Grandfather      Social History     Tobacco Use    Smoking status: Former     Current packs/day: 0.00     Types: Cigarettes     Quit date: 2018     Years since quittin.0     Passive exposure: Never    Smokeless tobacco: Former     Types: Chew   Vaping Use    Vaping status: Never Used   Substance and Sexual Activity    Alcohol use: Not Currently     Alcohol/week: 3.0 standard drinks of alcohol     Types: 3 Cans of beer per week     Comment: socially    Drug use: No    Sexual activity: Yes     Partners: Female     Medications[1]  No Known Allergies  Immunization History   Administered " "Date(s) Administered    COVID-19 MODERNA VACC 0.5 ML IM 05/26/2021, 06/23/2021    INFLUENZA 01/03/2018    Influenza Quadrivalent Preservative Free 3 years and older IM 01/03/2018    Influenza, injectable, quadrivalent, preservative free 0.5 mL 11/10/2020    Tdap 01/03/2018     Objective   /64 (BP Location: Left arm, Patient Position: Sitting, Cuff Size: Large)   Pulse 105   Temp 97.8 °F (36.6 °C) (Tympanic)   Resp 17   Ht 5' 11\" (1.803 m)   Wt (!) 147 kg (324 lb)   SpO2 97%   BMI 45.19 kg/m²     Physical Exam  Vitals and nursing note reviewed.   Constitutional:       Appearance: Normal appearance.   HENT:      Head: Normocephalic and atraumatic.     Eyes:      Conjunctiva/sclera: Conjunctivae normal.       Cardiovascular:      Rate and Rhythm: Normal rate and regular rhythm.      Heart sounds: Normal heart sounds.   Pulmonary:      Effort: Pulmonary effort is normal.      Breath sounds: Normal breath sounds.     Musculoskeletal:         General: Normal range of motion.        Arms:       Cervical back: Normal range of motion and neck supple.      Comments: C/o pain with movement      Skin:     General: Skin is warm and dry.      Capillary Refill: Capillary refill takes less than 2 seconds.     Neurological:      General: No focal deficit present.      Mental Status: He is alert and oriented to person, place, and time.     Psychiatric:         Mood and Affect: Mood normal.         Behavior: Behavior normal.                [1]   Current Outpatient Medications on File Prior to Visit   Medication Sig    albuterol (Ventolin HFA) 90 mcg/act inhaler Inhale 2 puffs every 6 (six) hours as needed for wheezing    Medrol 4 MG tablet therapy pack Take 4 mg by mouth daily    ondansetron (ZOFRAN-ODT) 4 mg disintegrating tablet Take 1 tablet (4 mg total) by mouth every 6 (six) hours as needed for nausea or vomiting    Semaglutide-Weight Management (WEGOVY) 0.25 MG/0.5ML Inject 0.5 mL (0.25 mg total) under the skin once " a week    COVID-19 At Home Antigen Test KIT Test 1 kit once as needed (cold symptoms) for up to 1 dose (Patient not taking: Reported on 5/20/2025)    phentermine (ADIPEX-P) 37.5 MG tablet take 1 tablet by mouth every morning (Patient not taking: Reported on 5/20/2025)

## 2025-05-21 PROBLEM — R17 ELEVATED BILIRUBIN: Status: ACTIVE | Noted: 2025-05-21

## 2025-05-21 PROBLEM — M79.632 PAIN OF LEFT FOREARM: Status: ACTIVE | Noted: 2025-05-21

## 2025-05-21 NOTE — ED ATTENDING ATTESTATION
5/18/2025  IDelio MD, saw and evaluated the patient. I have discussed the patient with the resident/non-physician practitioner and agree with the resident's/non-physician practitioner's findings, Plan of Care, and MDM as documented in the resident's/non-physician practitioner's note, except where noted. All available labs and Radiology studies were reviewed.  I was present for key portions of any procedure(s) performed by the resident/non-physician practitioner and I was immediately available to provide assistance.       At this point I agree with the current assessment done in the Emergency Department.  I have conducted an independent evaluation of this patient a history and physical is as follows:SEE H AND P ABOVE- AGREE WITH ER RESIDENT TX PLAN / DISPO     ED Course  ED Course as of 05/21/25 1626   Sun May 18, 2025   1604 ER MD NOTE- 12 LEAD ECG REVIEWED BY ER MD-  NSR RATE OF 99  NO SIGNS OF ISCHEMIA/ INJURY / R HEART STRAIN / SUPRIYA/PERICARDITIS - COMPARED TO PREVIOUS  3/2/24- OTHER THAN INCREASED RATE- NO OTHER SIGN CHANGES   1651 ER MD NOTE- PT SEEN AND THOROUGHLY EVALUATED BY ER MD- CASE D/W ER RESIDENT- -- 30 YR MALE WITH SEVERAL C OMPLAINTS-- OVER LAST APPROX 2 WEEKS WITH BRIEF SENSE OF EXTERNAL MOVEMENTS -- ASSOCIATED WITH NAUSEA- THAT RESOLVE WHEN REAMING STILL- RESTING AND  DRINKING FLUIDS-- ALSO ASSOCIATED WITH SWEATS--  NAUSEA/ VOMITING --  HAD COVID APPROX 3 WEEKS AGO WITH COUGH/FEVER/LIGHTHEADED-  ONLY LIGHT HEAD AT TIMES REMAINES--  NO HEAD/NECK PAIN -- NO DIPLOPIA /DYSARTHRIA/ DYSPHAGIA/DYSPHONIA- DYSMETRIA - NO EAR COMPLAINTS-- ALSO C/O SEVERAL EPISODES PER DAY  OF GREEN DIARRHEA OVER LAST 3 DAYS-- AND  GRADUAL ONSET OF R MID BACK PAIN -- NO ABRUPT COLICY TYPE PAIN -- AND DARKER URINE TODAY -- NO FEVERS- NO CP/SOB/PALP/NEAR /SYNCOPE- NO NEW MEDS- ILL CONTACTS- PT DROVE HIMSELF TO THE ER -- NORMAL GAIT IN ER  TO ROOM --- NO POLYURIA/DIPSIA--  AVSS-- LOOKING ON CELLPHONE IN ROOM -- WELL  APPEARING IN AND- PULSE  % ON RA- INTERPRETATION IS NORMAL- NO INTERVENTION - RRR S1/S2-CTA--SOFT ABD- MILD; RUQ/R CVA AREA TENDERNESS- REST OF AB D IS SOFT - NO PERITONEAL SIGNS - EQUAL BILATERAL RADIAL/DP PULSES- NO BLE EDEMA/CALF TENDERNESS/ASYM/ ERYTHEMA-- - NORMAL - R SIDED FATIGUABLE HORIZONTAL NYSTAGMUS - THAT REPRODUCED  SENSE OF EXTERNAL MOTION WITH NAUSEA--  NORMAL CN EXAM- NORMAL BUE/BLE STRENGTH/SENSATION -  NEG TEST OF SCKEW- NORM AL  FINGER TO NOSE- HEEL TO SHIN ALL BILATERALLY -    1701 ER MD PROCEDURE NOTE -- EBE HALLPIKE MANEUVER  WITH FATIGUABLE HORIZONTAL NYSTAGMUS  WITH NAUSEA AND SEVERE SYMPTOMS ON RIGHT-- PT THEN ROLLED OVER ON LEFT SIDE  WITH R SHOULDER POINTING UP FOR 2 MINUTES-- - EPLY MANEUVER - WILL RE-EVAL    1802 Er md note- er resident ruq u/s images reviewed by er md   1831 Er md note- pt feesl improved-   with vertigo- in er- developed itchy red body rash - only got iv zofran for n/v x 1 today --  no angioedema- no wheezing-- no other comps- will obs in er prior to d/c-- pt given oral benadryl          Critical Care Time  Procedures

## 2025-05-21 NOTE — ASSESSMENT & PLAN NOTE
Orders:    Comprehensive metabolic panel; Future    CBC and differential; Future    Lipase; Future    C-reactive protein; Future

## 2025-05-29 ENCOUNTER — TELEPHONE (OUTPATIENT)
Dept: FAMILY MEDICINE CLINIC | Facility: CLINIC | Age: 30
End: 2025-05-29

## 2025-06-02 NOTE — TELEPHONE ENCOUNTER
Pt mom stated that she stopped at [facility] to see if they can just transfer [facility] they told [facility] that [facility] can [facility] but they were canceled cause they said scripts were . Patient wants all medications that [facility]cribes sent to [facility]

## 2025-06-19 ENCOUNTER — OFFICE VISIT (OUTPATIENT)
Dept: OBGYN CLINIC | Facility: CLINIC | Age: 30
End: 2025-06-19
Payer: COMMERCIAL

## 2025-06-19 VITALS — HEIGHT: 71 IN | WEIGHT: 315 LBS | BODY MASS INDEX: 44.1 KG/M2

## 2025-06-19 DIAGNOSIS — M77.12 LATERAL EPICONDYLITIS OF LEFT ELBOW: ICD-10-CM

## 2025-06-19 DIAGNOSIS — G56.03 CARPAL TUNNEL SYNDROME, BILATERAL: ICD-10-CM

## 2025-06-19 DIAGNOSIS — M65.832 EXTENSOR INTERSECTION SYNDROME OF LEFT WRIST: Primary | ICD-10-CM

## 2025-06-19 PROCEDURE — 99204 OFFICE O/P NEW MOD 45 MIN: CPT | Performed by: ORTHOPAEDIC SURGERY

## 2025-06-19 NOTE — ASSESSMENT & PLAN NOTE
Physical exam, diagnostic imaging, and subjective history are all most consistent with the above diagnosis. The pathoanatomy and natural history of this diagnosis was explained to the patient in detail.   Order placed today for patient to initiate outpatient Physical Therapy  Recommend using wrist brace to allow rest.  Apply Voltaren gel to painful area up to 4 times a day      Orders:    Ambulatory Referral to PT/OT Hand Therapy; Future    EMG; Future    Brace

## 2025-06-19 NOTE — PROGRESS NOTES
Name: Burak Croft      : 1995      MRN: [id number]  Encounter Provider: Salvador Clemens DO  Encounter Date: 2025   Encounter department: [facility]      :  Assessment & Plan  Extensor intersection syndrome of left wrist  Lateral epicondylitis of left elbow    Physical exam, diagnostic imaging, and subjective history are all most consistent with the above diagnosis. The pathoanatomy and natural history of this diagnosis was explained to the patient in detail.   Order placed today for patient to initiate outpatient Physical Therapy  Recommend using wrist brace to allow rest.  Apply Voltaren gel to painful area up to 4 times a day      Orders:    Ambulatory Referral to PT/OT Hand Therapy; Future    EMG; Future    Brace    Carpal tunnel syndrome, bilateral  Recommend EMG of bilateral upper extremity syndrome to assess for carpal tunnel syndrome.  Orders:    EMG; Future          Subjective:  Burak Croft is a 30 y.o. male who presents today with complaints of left forearm pain that has been present for 3 months. Patient denies specific injury but states that he goes to the gym regularly and also carries a lot of heavy objects at work. Patient states that he will have left forearm pain when squeezing something. Patient states that he will get some numbness in bilateral hands with use.       The following portions of the patient's history were reviewed and updated as appropriate: allergies, current medications, past family history, past social history, past surgical history and problem list.      Social History     Socioeconomic History    Marital status: Single     Spouse name: Not on file    Number of children: Not on file    Years of education: Not on file    Highest education level: Not on file   Occupational History    Not on file   Tobacco Use    Smoking status: Former     Current packs/day: 0.00     Types: Cigarettes     Quit date: 2018     Years since quitting:  "7.0     Passive exposure: Never    Smokeless tobacco: Former     Types: Chew   Vaping Use    Vaping status: Never Used   Substance and Sexual Activity    Alcohol use: Not Currently     Alcohol/week: 3.0 standard drinks of alcohol     Types: 3 Cans of beer per week     Comment: socially    Drug use: No    Sexual activity: Yes     Partners: Female   Other Topics Concern    Not on file   Social History Narrative    Daily coffee consumption:1 cup a day        Most recent tobacco use screenin2018     Social Drivers of Health     Financial Resource Strain: Not on file   Food Insecurity: Not on file   Transportation Needs: Not on file   Physical Activity: Not on file   Stress: Not on file   Social Connections: Not on file   Intimate Partner Violence: Not on file   Housing Stability: Not on file     Past Medical History[1]  Past Surgical History[2]  Allergies[3]  Medications Ordered Prior to Encounter[4]         Objective:    Review of Systems  Pertinent items are noted in HPI.  All other systems were reviewed and are negative.    Physical Exam  Cons: Appears well.  No apparent distress.  Psych: Alert. Oriented x3.  Mood and affect normal.  Eyes: PERRLA, EOMI  Resp: Normal effort.  No audible wheezing or stridor.  CV: Palpable pulse.  No discernable arrhythmia.  No LE edema.  Lymph:  No palpable cervical, axillary, or inguinal lymphadenopathy.  Skin: Warm.  No palpable masses.  No visible lesions.  Neuro: Normal muscle tone.  Normal and symmetric DTR's.    BMI:   Estimated body mass index is 47.42 kg/m² as calculated from the following:    Height as of this encounter: 5' 11\" (1.803 m).    Weight as of this encounter: 154 kg (340 lb).  Lab Results   Component Value Date    HGBA1C 5.7 (H) 2023         Right Hand Exam     Tenderness   The patient is experiencing no tenderness.     Range of Motion   The patient has normal right wrist ROM.     Muscle Strength   The patient has normal right wrist strength.    Tests " "  Phalen’s sign: positive  Tinel's sign (median nerve): positive      Left Hand Exam     Tenderness   The patient is experiencing tenderness in the radial area.     Range of Motion   The patient has normal left wrist ROM.    Muscle Strength   The patient has normal left wrist strength.    Tests   Phalen’s sign: positive  Tinel's sign (median nerve): positive    Other   Sensation: normal  Pulse: present    Comments:  +TTP lateral epicondyle  +TTP in extensor compartment of forearm consistent with intersection syndrome.                  Portions of the record may have been created with voice recognition software.  Occasional wrong word or \"sound a like\" substitutions may have occurred due to the inherent limitations of voice recognition software.  Read the chart carefully and recognize, using context, where substitutions have occurred.          [1]   Past Medical History:  Diagnosis Date    Acne     Ankle fracture     right    Anxiety 09/27/2020    Apnea 04/28/2020    Class 3 severe obesity due to excess calories without serious comorbidity with body mass index (BMI) of 45.0 to 49.9 in adult 04/28/2020    Daytime somnolence 04/28/2020    Depression     Fall [id number]    \"fell off of a roof\"    Mild intermittent asthma     Nonintractable headache 04/28/2020    Poor short term memory 04/28/2020    Sleep apnea     Snoring 04/28/2020   [2]   Past Surgical History:  Procedure Laterality Date    TOOTH EXTRACTION     [3] No Known Allergies  [4]   Current Outpatient Medications on File Prior to Visit   Medication Sig Dispense Refill    albuterol (Ventolin HFA) 90 mcg/act inhaler Inhale 2 puffs every 6 (six) hours as needed for wheezing 18 g 5    COVID-19 At Home Antigen Test KIT Test 1 kit once as needed (cold symptoms) for up to 1 dose (Patient not taking: Reported on 5/20/2025) 1 kit 1    Medrol 4 MG tablet therapy pack Take 4 mg by mouth daily      ondansetron (ZOFRAN-ODT) 4 mg disintegrating tablet Take 1 tablet (4 mg " total) by mouth every 6 (six) hours as needed for nausea or vomiting 20 tablet 0    phentermine (ADIPEX-P) 37.5 MG tablet take 1 tablet by mouth every morning (Patient not taking: Reported on 5/20/2025) 30 tablet 2    Semaglutide-Weight Management (WEGOVY) 0.25 MG/0.5ML Inject 0.5 mL (0.25 mg total) under the skin once a week 2 mL 1     No current facility-administered medications on file prior to visit.

## 2025-06-19 NOTE — ASSESSMENT & PLAN NOTE
Recommend EMG of bilateral upper extremity syndrome to assess for carpal tunnel syndrome.  Orders:    EMG; Future

## 2025-06-24 NOTE — ASSESSMENT & PLAN NOTE
Orders:    Ambulatory Referral to Orthopedic Surgery; Future     [FreeTextEntry1] : continue warm soaks to area repeat mammo and exam in one month

## 2025-08-04 ENCOUNTER — OFFICE VISIT (OUTPATIENT)
Dept: FAMILY MEDICINE CLINIC | Facility: CLINIC | Age: 30
End: 2025-08-04
Payer: COMMERCIAL

## 2025-08-04 VITALS
RESPIRATION RATE: 17 BRPM | SYSTOLIC BLOOD PRESSURE: 130 MMHG | TEMPERATURE: 97.1 F | HEART RATE: 79 BPM | HEIGHT: 71 IN | DIASTOLIC BLOOD PRESSURE: 80 MMHG | WEIGHT: 315 LBS | BODY MASS INDEX: 44.1 KG/M2 | OXYGEN SATURATION: 98 %

## 2025-08-04 DIAGNOSIS — E66.01 MORBID OBESITY WITH BMI OF 45.0-49.9, ADULT (HCC): ICD-10-CM

## 2025-08-04 DIAGNOSIS — G47.33 OBSTRUCTIVE SLEEP APNEA SYNDROME: ICD-10-CM

## 2025-08-04 DIAGNOSIS — R17 ELEVATED BILIRUBIN: ICD-10-CM

## 2025-08-04 DIAGNOSIS — R53.83 OTHER FATIGUE: ICD-10-CM

## 2025-08-04 DIAGNOSIS — G47.33 OBSTRUCTIVE SLEEP APNEA SYNDROME: Primary | ICD-10-CM

## 2025-08-04 DIAGNOSIS — R73.01 IFG (IMPAIRED FASTING GLUCOSE): ICD-10-CM

## 2025-08-04 PROCEDURE — 99214 OFFICE O/P EST MOD 30 MIN: CPT | Performed by: NURSE PRACTITIONER

## 2025-08-04 RX ORDER — TIRZEPATIDE 2.5 MG/.5ML
2.5 INJECTION, SOLUTION SUBCUTANEOUS WEEKLY
Qty: 2 ML | Refills: 0 | Status: SHIPPED | OUTPATIENT
Start: 2025-08-04 | End: 2025-09-01

## 2025-08-05 DIAGNOSIS — E66.01 MORBID OBESITY WITH BMI OF 45.0-49.9, ADULT (HCC): ICD-10-CM

## 2025-08-05 DIAGNOSIS — G47.33 OBSTRUCTIVE SLEEP APNEA SYNDROME: ICD-10-CM

## 2025-08-05 RX ORDER — SEMAGLUTIDE 0.25 MG/.5ML
INJECTION, SOLUTION SUBCUTANEOUS
Refills: 0 | OUTPATIENT
Start: 2025-08-05

## 2025-08-06 ENCOUNTER — TELEPHONE (OUTPATIENT)
Age: 30
End: 2025-08-06

## 2025-08-06 RX ORDER — SEMAGLUTIDE 0.25 MG/.5ML
INJECTION, SOLUTION SUBCUTANEOUS
Refills: 0 | OUTPATIENT
Start: 2025-08-06